# Patient Record
Sex: MALE | Race: WHITE | NOT HISPANIC OR LATINO | Employment: FULL TIME | URBAN - METROPOLITAN AREA
[De-identification: names, ages, dates, MRNs, and addresses within clinical notes are randomized per-mention and may not be internally consistent; named-entity substitution may affect disease eponyms.]

---

## 2019-04-20 ENCOUNTER — HOSPITAL ENCOUNTER (EMERGENCY)
Facility: CLINIC | Age: 39
Discharge: HOME | End: 2019-04-20
Attending: EMERGENCY MEDICINE | Admitting: EMERGENCY MEDICINE
Payer: COMMERCIAL

## 2019-04-20 VITALS
SYSTOLIC BLOOD PRESSURE: 130 MMHG | HEIGHT: 77 IN | RESPIRATION RATE: 16 BRPM | WEIGHT: 290 LBS | DIASTOLIC BLOOD PRESSURE: 75 MMHG | BODY MASS INDEX: 34.24 KG/M2 | HEART RATE: 64 BPM | TEMPERATURE: 97.7 F | OXYGEN SATURATION: 99 %

## 2019-04-20 DIAGNOSIS — R11.10 VOMITING AND DIARRHEA: ICD-10-CM

## 2019-04-20 DIAGNOSIS — R19.7 VOMITING AND DIARRHEA: ICD-10-CM

## 2019-04-20 LAB
ABO + RH BLD: NORMAL
ABO + RH BLD: NORMAL
ALBUMIN SERPL-MCNC: 3.7 G/DL (ref 3.4–5)
ALP SERPL-CCNC: 54 U/L (ref 40–150)
ALT SERPL W P-5'-P-CCNC: 48 U/L (ref 0–70)
ANION GAP SERPL CALCULATED.3IONS-SCNC: 6 MMOL/L (ref 3–14)
AST SERPL W P-5'-P-CCNC: 22 U/L (ref 0–45)
BASOPHILS # BLD AUTO: 0 10E9/L (ref 0–0.2)
BASOPHILS NFR BLD AUTO: 0.2 %
BILIRUB SERPL-MCNC: 0.6 MG/DL (ref 0.2–1.3)
BLD GP AB SCN SERPL QL: NORMAL
BLOOD BANK CMNT PATIENT-IMP: NORMAL
BUN SERPL-MCNC: 13 MG/DL (ref 7–30)
C COLI+JEJUNI+LARI FUSA STL QL NAA+PROBE: NOT DETECTED
CALCIUM SERPL-MCNC: 8.6 MG/DL (ref 8.5–10.1)
CHLORIDE SERPL-SCNC: 106 MMOL/L (ref 94–109)
CO2 SERPL-SCNC: 25 MMOL/L (ref 20–32)
CREAT SERPL-MCNC: 1.05 MG/DL (ref 0.66–1.25)
DIFFERENTIAL METHOD BLD: NORMAL
EC STX1 GENE STL QL NAA+PROBE: NOT DETECTED
EC STX2 GENE STL QL NAA+PROBE: NOT DETECTED
ENTERIC PATHOGEN COMMENT: NORMAL
EOSINOPHIL # BLD AUTO: 0.1 10E9/L (ref 0–0.7)
EOSINOPHIL NFR BLD AUTO: 1.8 %
ERYTHROCYTE [DISTWIDTH] IN BLOOD BY AUTOMATED COUNT: 13.4 % (ref 10–15)
GFR SERPL CREATININE-BSD FRML MDRD: 89 ML/MIN/{1.73_M2}
GLUCOSE SERPL-MCNC: 96 MG/DL (ref 70–99)
HCT VFR BLD AUTO: 48.2 % (ref 40–53)
HEMOCCULT STL QL IA: NEGATIVE
HGB BLD-MCNC: 15.4 G/DL (ref 13.3–17.7)
IMM GRANULOCYTES # BLD: 0 10E9/L (ref 0–0.4)
IMM GRANULOCYTES NFR BLD: 0.4 %
INR PPP: 1.29 (ref 0.86–1.14)
LYMPHOCYTES # BLD AUTO: 1.4 10E9/L (ref 0.8–5.3)
LYMPHOCYTES NFR BLD AUTO: 28.5 %
MCH RBC QN AUTO: 28.7 PG (ref 26.5–33)
MCHC RBC AUTO-ENTMCNC: 32 G/DL (ref 31.5–36.5)
MCV RBC AUTO: 90 FL (ref 78–100)
MONOCYTES # BLD AUTO: 0.6 10E9/L (ref 0–1.3)
MONOCYTES NFR BLD AUTO: 11.4 %
NEUTROPHILS # BLD AUTO: 2.9 10E9/L (ref 1.6–8.3)
NEUTROPHILS NFR BLD AUTO: 57.7 %
NOROV GI+II ORF1-ORF2 JNC STL QL NAA+PR: NOT DETECTED
NRBC # BLD AUTO: 0 10*3/UL
NRBC BLD AUTO-RTO: 0 /100
PLATELET # BLD AUTO: 230 10E9/L (ref 150–450)
POTASSIUM SERPL-SCNC: 3.8 MMOL/L (ref 3.4–5.3)
PROT SERPL-MCNC: 7.4 G/DL (ref 6.8–8.8)
RBC # BLD AUTO: 5.37 10E12/L (ref 4.4–5.9)
RVA NSP5 STL QL NAA+PROBE: NOT DETECTED
SALMONELLA SP RPOD STL QL NAA+PROBE: NOT DETECTED
SHIGELLA SP+EIEC IPAH STL QL NAA+PROBE: NOT DETECTED
SODIUM SERPL-SCNC: 137 MMOL/L (ref 133–144)
SPECIMEN EXP DATE BLD: NORMAL
V CHOL+PARA RFBL+TRKH+TNAA STL QL NAA+PR: NOT DETECTED
WBC # BLD AUTO: 5 10E9/L (ref 4–11)
Y ENTERO RECN STL QL NAA+PROBE: NOT DETECTED

## 2019-04-20 PROCEDURE — 99284 EMERGENCY DEPT VISIT MOD MDM: CPT | Mod: Z6 | Performed by: EMERGENCY MEDICINE

## 2019-04-20 PROCEDURE — 85610 PROTHROMBIN TIME: CPT | Performed by: EMERGENCY MEDICINE

## 2019-04-20 PROCEDURE — 82274 ASSAY TEST FOR BLOOD FECAL: CPT | Performed by: EMERGENCY MEDICINE

## 2019-04-20 PROCEDURE — 99284 EMERGENCY DEPT VISIT MOD MDM: CPT | Mod: 25 | Performed by: EMERGENCY MEDICINE

## 2019-04-20 PROCEDURE — 25000132 ZZH RX MED GY IP 250 OP 250 PS 637: Performed by: EMERGENCY MEDICINE

## 2019-04-20 PROCEDURE — 86850 RBC ANTIBODY SCREEN: CPT | Performed by: EMERGENCY MEDICINE

## 2019-04-20 PROCEDURE — 87506 IADNA-DNA/RNA PROBE TQ 6-11: CPT | Performed by: EMERGENCY MEDICINE

## 2019-04-20 PROCEDURE — 96374 THER/PROPH/DIAG INJ IV PUSH: CPT | Performed by: EMERGENCY MEDICINE

## 2019-04-20 PROCEDURE — 96375 TX/PRO/DX INJ NEW DRUG ADDON: CPT | Performed by: EMERGENCY MEDICINE

## 2019-04-20 PROCEDURE — 96361 HYDRATE IV INFUSION ADD-ON: CPT | Performed by: EMERGENCY MEDICINE

## 2019-04-20 PROCEDURE — C9113 INJ PANTOPRAZOLE SODIUM, VIA: HCPCS | Performed by: EMERGENCY MEDICINE

## 2019-04-20 PROCEDURE — 86901 BLOOD TYPING SEROLOGIC RH(D): CPT | Performed by: EMERGENCY MEDICINE

## 2019-04-20 PROCEDURE — 25000128 H RX IP 250 OP 636: Performed by: EMERGENCY MEDICINE

## 2019-04-20 PROCEDURE — 86900 BLOOD TYPING SEROLOGIC ABO: CPT | Performed by: EMERGENCY MEDICINE

## 2019-04-20 PROCEDURE — 80053 COMPREHEN METABOLIC PANEL: CPT | Performed by: EMERGENCY MEDICINE

## 2019-04-20 PROCEDURE — 85025 COMPLETE CBC W/AUTO DIFF WBC: CPT | Performed by: EMERGENCY MEDICINE

## 2019-04-20 RX ORDER — NICOTINE 21 MG/24HR
1 PATCH, TRANSDERMAL 24 HOURS TRANSDERMAL ONCE
Status: COMPLETED | OUTPATIENT
Start: 2019-04-20 | End: 2019-04-20

## 2019-04-20 RX ORDER — HYDROMORPHONE HYDROCHLORIDE 1 MG/ML
0.5 INJECTION, SOLUTION INTRAMUSCULAR; INTRAVENOUS; SUBCUTANEOUS
Status: DISCONTINUED | OUTPATIENT
Start: 2019-04-20 | End: 2019-04-20 | Stop reason: HOSPADM

## 2019-04-20 RX ORDER — SODIUM CHLORIDE 9 MG/ML
1000 INJECTION, SOLUTION INTRAVENOUS CONTINUOUS
Status: DISCONTINUED | OUTPATIENT
Start: 2019-04-20 | End: 2019-04-20 | Stop reason: HOSPADM

## 2019-04-20 RX ORDER — BUPROPION HYDROCHLORIDE 150 MG/1
150 TABLET ORAL
COMMUNITY
Start: 2019-04-18 | End: 2020-11-06 | Stop reason: ALTCHOICE

## 2019-04-20 RX ORDER — ONDANSETRON 2 MG/ML
8 INJECTION INTRAMUSCULAR; INTRAVENOUS ONCE
Status: DISCONTINUED | OUTPATIENT
Start: 2019-04-20 | End: 2019-04-20 | Stop reason: HOSPADM

## 2019-04-20 RX ADMIN — NICOTINE 1 PATCH: 21 PATCH TRANSDERMAL at 13:53

## 2019-04-20 RX ADMIN — SODIUM CHLORIDE 1000 ML: 9 INJECTION, SOLUTION INTRAVENOUS at 12:16

## 2019-04-20 RX ADMIN — PANTOPRAZOLE SODIUM 40 MG: 40 INJECTION, POWDER, LYOPHILIZED, FOR SOLUTION INTRAVENOUS at 12:18

## 2019-04-20 ASSESSMENT — ENCOUNTER SYMPTOMS
NAUSEA: 1
DIARRHEA: 1
ABDOMINAL PAIN: 1
VOMITING: 1

## 2019-04-20 ASSESSMENT — MIFFLIN-ST. JEOR: SCORE: 2352.81

## 2019-04-20 NOTE — DISCHARGE INSTRUCTIONS
Keep hydrated    Advance diet as tolerated over the next 24 hours    Please make an appointment to follow up with Your Primary Care Provider in 3 days if not improving.

## 2019-04-20 NOTE — ED AVS SNAPSHOT
[facility], [place], Emergency Department  [address] [place] [address]  Phone:  [phone]                                    Jace Pratt   MRN: [id number]    Department:  [facility], Emergency Department   Date of Visit:  4/20/2019           After Visit Summary Signature Page    I have received my discharge instructions, and my questions have been answered. I have discussed any challenges I see with this plan with the nurse or doctor.    ..........................................................................................................................................  Patient/Patient Representative Signature      ..........................................................................................................................................  Patient Representative Print Name and Relationship to Patient    ..................................................               ................................................  Date                                   Time    ..........................................................................................................................................  Reviewed by Signature/Title    ...................................................              ..............................................  Date                                               Time          22EPIC Rev 08/18

## 2019-04-20 NOTE — ED TRIAGE NOTES
Patient here with complaints of black, tarry stool since Thursday and shortness of breath. Is on Xeralto for PE after corrective surgery on his right foot.

## 2019-04-20 NOTE — ED PROVIDER NOTES
History     Chief Complaint   Patient presents with     Rectal Bleeding     The history is provided by the patient and medical records.     Jace Pratt is a 38 year old male who has been on Xarelto for a pulmonary embolus that came as a result of a DVT as a result of foot surgery he had in February.  The patient had been seen up at [facility] and follow-up scans 3 weeks ago revealed both the chest CT and his right lower extremity ultrasound showed no evidence of any residual clot.  The patient states that the last 24 hours he ate some food and, shortly there afterwards, got sick to the point where he was having nausea, vomiting, and diarrhea.  Patient was in [place] at the time and states he was supposed to have a fun weekend.  Patient states he spent most the weekend, however, in the hotel room.  When the patient's vomiting continued today, he came to the ER for further evaluation.  The patient started having some black tarry stools yesterday and then continued today, with his last stool being 1 hour ago.  Patient was having watery diarrhea that was black in nature.  Patient admits to taking Pepto-Bismol and thinks the black tarry stools started before he took the Pepto-Bismol.  Patient denies any near syncopal episodes, denies any chest pain, but complains of epigastric pain.    This part of the medical record was transcribed by Clementina Mcmullen Medical Scribe, from a dictation done by Marcelo Brower MD.    I have reviewed the Medications, Allergies, Past Medical and Surgical History, and Social History in the [facility] system.    Past Medical History:   Diagnosis Date     Neuromuscular disorder (H)        No current facility-administered medications on file prior to encounter.   Current Outpatient Medications on File Prior to Encounter:  buPROPion (WELLBUTRIN XL) 150 MG 24 hr tablet Take 150 mg by mouth   rivaroxaban ANTICOAGULANT (XARELTO) 20 MG TABS tablet Take 20 mg by mouth     Allergies   Allergen Reactions      "Azithromycin Nausea and Vomiting     Latex Hives     Morphine Hives     Penicillins Nausea and Vomiting     Social History     Socioeconomic History     Marital status:      Spouse name: Not on file     Number of children: Not on file     Years of education: Not on file     Highest education level: Not on file   Occupational History     Not on file   Social Needs     Financial resource strain: Not on file     Food insecurity:     Worry: Not on file     Inability: Not on file     Transportation needs:     Medical: Not on file     Non-medical: Not on file   Tobacco Use     Smoking status: Former Smoker     Smokeless tobacco: Current User   Substance and Sexual Activity     Alcohol use: Not Currently     Alcohol/week: 0.0 oz     Drug use: Never     Sexual activity: Not on file   Lifestyle     Physical activity:     Days per week: Not on file     Minutes per session: Not on file     Stress: Not on file   Relationships     Social connections:     Talks on phone: Not on file     Gets together: Not on file     Attends Confucianist service: Not on file     Active member of club or organization: Not on file     Attends meetings of clubs or organizations: Not on file     Relationship status: Not on file     Intimate partner violence:     Fear of current or ex partner: Not on file     Emotionally abused: Not on file     Physically abused: Not on file     Forced sexual activity: Not on file   Other Topics Concern     Not on file   Social History Narrative     Not on file     History reviewed. No pertinent surgical history.  History reviewed. No pertinent family history.    Review of Systems   Cardiovascular: Negative for chest pain.   Gastrointestinal: Positive for abdominal pain (Epigastric), diarrhea (Black tarry stool), nausea and vomiting.   Neurological: Negative for syncope.       Physical Exam   BP: (!) 140/95  Pulse: 63  Heart Rate: 65  Temp: 97.7  F (36.5  C)  Resp: 18  Height: 195.6 cm (6' 5\")  Weight: 131.5 kg " (290 lb)  SpO2: 97 %      Physical Exam   Constitutional: He is oriented to person, place, and time.   Alert conversant pleasant   HENT:   Head: Atraumatic.   Eyes: Pupils are equal, round, and reactive to light. EOM are normal.   Neck: Neck supple.   Cardiovascular: Regular rhythm.   Pulmonary/Chest: Breath sounds normal.   Abdominal: Soft. There is no tenderness.   Genitourinary:   Genitourinary Comments: Patient's stool guaiac was done with mucus from the patient's rectal vault and this had an equivocal reading on the guaiac card    Stool specimen will be sent for stool guaiac in the lab.   Musculoskeletal: He exhibits no edema or deformity.   Neurological: He is alert and oriented to person, place, and time.   Grossly intact and symmetric   Skin: Skin is warm.   Psychiatric: He has a normal mood and affect.   Nursing note and vitals reviewed.      ED Course        Procedures        Results for orders placed or performed during the hospital encounter of 04/20/19   CBC with platelets differential   Result Value Ref Range    WBC 5.0 4.0 - 11.0 10e9/L    RBC Count 5.37 4.4 - 5.9 10e12/L    Hemoglobin 15.4 13.3 - 17.7 g/dL    Hematocrit 48.2 40.0 - 53.0 %    MCV 90 78 - 100 fl    MCH 28.7 26.5 - 33.0 pg    MCHC 32.0 31.5 - 36.5 g/dL    RDW 13.4 10.0 - 15.0 %    Platelet Count 230 150 - 450 10e9/L    Diff Method Automated Method     % Neutrophils 57.7 %    % Lymphocytes 28.5 %    % Monocytes 11.4 %    % Eosinophils 1.8 %    % Basophils 0.2 %    % Immature Granulocytes 0.4 %    Nucleated RBCs 0 0 /100    Absolute Neutrophil 2.9 1.6 - 8.3 10e9/L    Absolute Lymphocytes 1.4 0.8 - 5.3 10e9/L    Absolute Monocytes 0.6 0.0 - 1.3 10e9/L    Absolute Eosinophils 0.1 0.0 - 0.7 10e9/L    Absolute Basophils 0.0 0.0 - 0.2 10e9/L    Abs Immature Granulocytes 0.0 0 - 0.4 10e9/L    Absolute Nucleated RBC 0.0    Comprehensive metabolic panel   Result Value Ref Range    Sodium 137 133 - 144 mmol/L    Potassium 3.8 3.4 - 5.3 mmol/L     Chloride 106 94 - 109 mmol/L    Carbon Dioxide 25 20 - 32 mmol/L    Anion Gap 6 3 - 14 mmol/L    Glucose 96 70 - 99 mg/dL    Urea Nitrogen 13 7 - 30 mg/dL    Creatinine 1.05 0.66 - 1.25 mg/dL    GFR Estimate 89 >60 mL/min/[1.73_m2]    GFR Estimate If Black >90 >60 mL/min/[1.73_m2]    Calcium 8.6 8.5 - 10.1 mg/dL    Bilirubin Total 0.6 0.2 - 1.3 mg/dL    Albumin 3.7 3.4 - 5.0 g/dL    Protein Total 7.4 6.8 - 8.8 g/dL    Alkaline Phosphatase 54 40 - 150 U/L    ALT 48 0 - 70 U/L    AST 22 0 - 45 U/L   INR   Result Value Ref Range    INR 1.29 (H) 0.86 - 1.14   Fecal colorectal cancer screen FIT   Result Value Ref Range    Occult Blood Scn FIT Negative NEG^Negative   ABO/Rh type and screen   Result Value Ref Range    ABO A     RH(D) Pos     Antibody Screen Neg     Test Valid Only At          [facility]    Specimen Expires 04/23/2019        Labs Ordered and Resulted from Time of ED Arrival Up to the Time of Departure from the ED   INR - Abnormal; Notable for the following components:       Result Value    INR 1.29 (*)     All other components within normal limits   CBC WITH PLATELETS DIFFERENTIAL   COMPREHENSIVE METABOLIC PANEL   FECAL COLORECTAL CANCER SCREEN FIT   PERIPHERAL IV CATHETER   ABO/RH TYPE AND SCREEN   ENTERIC BACTERIA AND VIRUS PANEL BY MARY STOOL            Assessments & Plan (with Medical Decision Making)     I have reviewed the nursing notes.    Medications   0.9% sodium chloride BOLUS (0 mLs Intravenous Stopped 4/20/19 0020)     Followed by   sodium chloride 0.9% infusion (has no administration in time range)   ondansetron (ZOFRAN) injection 8 mg (has no administration in time range)   HYDROmorphone (PF) (DILAUDID) injection 0.5 mg (has no administration in time range)   nicotine patch REMOVAL (has no administration in time range)   nicotine Patch in Place ( Transdermal Patch in Place 4/20/19 9607)   pantoprazole (PROTONIX) 40 mg IV push injection (40 mg  Intravenous Given 4/20/19 1218)   nicotine (NICODERM CQ) 21 MG/24HR 24 hr patch 1 patch (1 patch Transdermal Given 4/20/19 8413)       Patient stool in the lab came back without blood indicating that the apparent melena was Pepto-Bismol related.  At this time the patient is taking orally well and will be sent home.  Patient more than likely developed vomiting and diarrhea from gastroenteritis or food poisoning.    I have reviewed the findings, diagnosis, plan and need for follow up with the patient.    Final diagnoses:   Vomiting and diarrhea     Keep hydrated    Advance diet as tolerated over the next 24 hours    Please make an appointment to follow up with Your Primary Care Provider in 3 days if not improving.    Routine discharge instructions were given for this diagnosis    Marcelo Brower MD    4/20/2019   [facility], EMERGENCY DEPARTMENT     Marcelo Brower MD  04/20/19 4138

## 2020-07-10 ENCOUNTER — PRE VISIT (OUTPATIENT)
Dept: ORTHOPEDICS | Facility: CLINIC | Age: 40
End: 2020-07-10

## 2020-07-11 NOTE — TELEPHONE ENCOUNTER
DIAGNOSIS: Pt saw Scott many years ago and did surgery for Right Talus.   Pt had right ankle replacement on 02/2019 with Dr Shankar from [facility] who has now retired. Imaging also completed around time of procedure. Pt has started to develop pain and noticed lack of strength in right ankle. Records in care everywhere.   APPOINTMENT DATE: 8/4/2020   NOTES STATUS DETAILS   OFFICE NOTE from referring provider Care Everywhere    OFFICE NOTE from other specialist Care Everywhere    DISCHARGE SUMMARY from hospital Care Everywhere    DISCHARGE REPORT from the ER Care Everywhere    OPERATIVE REPORT Care Everywhere    MEDICATION LIST Care Everywhere    EMG (for Spine) N/A    IMPLANT RECORD/STICKER N/A    LABS     CBC/DIFF Care Everywhere    CULTURES N/A    INJECTIONS DONE IN RADIOLOGY N/A    MRI N/A    CT SCAN N/A    XRAYS (IMAGES & REPORTS) In pacs    TUMOR     PATHOLOGY  Slides & report N/A      Action    Action Taken REQ SENT TO [facility] FOR IMAGING CDK     Action    Action Taken img resolved in pacs cdk

## 2020-07-21 DIAGNOSIS — M25.571 RIGHT ANKLE PAIN: Primary | ICD-10-CM

## 2020-08-04 ENCOUNTER — ANCILLARY PROCEDURE (OUTPATIENT)
Dept: GENERAL RADIOLOGY | Facility: CLINIC | Age: 40
End: 2020-08-04
Attending: ORTHOPAEDIC SURGERY
Payer: COMMERCIAL

## 2020-08-04 ENCOUNTER — OFFICE VISIT (OUTPATIENT)
Dept: ORTHOPEDICS | Facility: CLINIC | Age: 40
End: 2020-08-04
Payer: COMMERCIAL

## 2020-08-04 ENCOUNTER — ANCILLARY PROCEDURE (OUTPATIENT)
Dept: CT IMAGING | Facility: CLINIC | Age: 40
End: 2020-08-04
Attending: ORTHOPAEDIC SURGERY
Payer: COMMERCIAL

## 2020-08-04 VITALS — BODY MASS INDEX: 34.86 KG/M2 | HEIGHT: 77 IN | WEIGHT: 295.2 LBS

## 2020-08-04 DIAGNOSIS — M25.571 PAIN IN JOINT, ANKLE AND FOOT, RIGHT: Primary | ICD-10-CM

## 2020-08-04 DIAGNOSIS — M25.571 RIGHT ANKLE PAIN: ICD-10-CM

## 2020-08-04 PROCEDURE — 73700 CT LOWER EXTREMITY W/O DYE: CPT | Mod: TC

## 2020-08-04 RX ORDER — BUSPIRONE HYDROCHLORIDE 15 MG/1
TABLET ORAL
Status: ON HOLD | COMMUNITY
Start: 2019-12-05 | End: 2020-12-01

## 2020-08-04 ASSESSMENT — MIFFLIN-ST. JEOR: SCORE: 2374.01

## 2020-08-04 NOTE — LETTER
8/4/2020         RE: Jace Pratt  [address] [place] [address]        Dear Colleague,    Thank you for referring your patient, Jace Pratt, to the [facility]. Please see a copy of my visit note below.    CHIEF COMPLAINT:  Right ankle pain, status post right calcaneus osteotomy with first metatarsal osteotomy and plantar fascia release performed by an outside provider on 02/25/2019.      HISTORY OF PRESENT ILLNESS:  Mr. Pratt is a 39-year-old male who presents today for evaluation of his right foot and ankle.  The patient reports to have had a long history of high arches and to have had a recent surgery in 02/2019 to correct his high arch.  These were performed by Dr. Shankar, who is a former local podiatrist.  At that time, a calcaneus osteotomy with a first metatarsal closing wedge osteotomy and an endoscopic plantar fascia release was performed.      The patient reports to have had an uneventful postoperative course.  Reports to have undergone physical therapy afterwards as well.  Now reports to have pain and discomfort located along the ankle joint, reports to be very stiff in the morning and to have difficulties ranging the ankle joint.  The patient reports to have difficulties with prolonged walking that he will not be able to walk beyond 10-11 holes of a golf course.      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  Reports to enjoy camping and hanging out with his family as he is unable to do any golfing anymore.      PAST MEDICAL HISTORY:  None.      PAST SURGICAL HISTORY:  Reviewed today.      DRUG ALLERGIES:  Reviewed today.      CURRENT MEDICATIONS:  Please refer to encounter form.      PHYSICAL EXAMINATION:  On today's visit, he presents as a pleasant male in no apparent distress with a height of 6 feet 5 and a weight of 295  pounds.  Denies to have any constitutional symptoms.      On today's visit, he presents with a range of motion of the ankle from 5 degrees of dorsiflexion down to 30 degrees of plantar flexion.  Overall, he presents with excellent alignment with a slightly hindfoot valgus, which is much improved when compared to the opposite side.  There are well-healed surgical incisions.  There is pain with palpation of the anterior aspect of the ankle joint.  First metatarsal and the calcaneus tuberosity are nontender.  He presents with some discomfort along the plantar fascia as well as the posterior aspect of the calcaneus tuberosity.  Achilles tendon is nontender.  To the best of my ability, the patient has not undergone an Achilles tendon lengthening.      IMAGING:  Plain x-rays were reviewed today with 3 views of the right ankle which were significant for showing no obvious arthritic changes and with what seems to be a healed calcaneus osteotomy.  However, the lateral projection is difficult to assess and is difficult to assess exactly if he presents with any anterior impingement of the ankle joint because of the lack of true lateral projection.      ASSESSMENT:   1.  Right ankle pain, possible impingement.   2.  Status post right calcaneus osteotomy with first metatarsal osteotomy and plantar fascia release performed by an outside provider.      PLAN:  I discussed with the patient that at this point I would like to proceed with a CT scan of the ankle as a way to better understand his bony anatomy.  Based on those findings, further recommendations will be given to the patient.      In the presence of a normal ankle, I would recommend to proceed with a corticosteroid injection to the right ankle joint and in the presence of some impingement will recommend him to undergo an arthroscopic debridement.      The patient one way or the other would benefit as well from physical therapy for range of motion exercises and strengthening  as well.      All questions were answered.  The patient will follow up accordingly.  He has no activity restrictions.  All questions were answered.      TT 30 minutes, CT 20 minutes.     Sincerely,        Jonel Chavez MD

## 2020-08-04 NOTE — NURSING NOTE
"Reason For Visit:   Chief Complaint   Patient presents with     RECHECK     right ankle pain and weakness       Ht 1.96 m (6' 5.17\")   Wt 133.9 kg (295 lb 3.2 oz)   BMI 34.86 kg/m      Pain Assessment  Patient Currently in Pain: Yes(Patient reports that the pain comes and goes. Stairs are difficult.)  Primary Pain Location: Ankle(and heel)    [place] [facility]    "

## 2020-08-04 NOTE — PROGRESS NOTES
CHIEF COMPLAINT:  Right ankle pain, status post right calcaneus osteotomy with first metatarsal osteotomy and plantar fascia release performed by an outside provider on 02/25/2019.      HISTORY OF PRESENT ILLNESS:  Mr. Pratt is a 39-year-old male who presents today for evaluation of his right foot and ankle.  The patient reports to have had a long history of high arches and to have had a recent surgery in 02/2019 to correct his high arch.  These were performed by Dr. Shankar, who is a former local podiatrist.  At that time, a calcaneus osteotomy with a first metatarsal closing wedge osteotomy and an endoscopic plantar fascia release was performed.      The patient reports to have had an uneventful postoperative course.  Reports to have undergone physical therapy afterwards as well.  Now reports to have pain and discomfort located along the ankle joint, reports to be very stiff in the morning and to have difficulties ranging the ankle joint.  The patient reports to have difficulties with prolonged walking that he will not be able to walk beyond 10-11 holes of a golf course.      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  Reports to enjoy camping and hanging out with his family as he is unable to do any golfing anymore.      PAST MEDICAL HISTORY:  None.      PAST SURGICAL HISTORY:  Reviewed today.      DRUG ALLERGIES:  Reviewed today.      CURRENT MEDICATIONS:  Please refer to encounter form.      PHYSICAL EXAMINATION:  On today's visit, he presents as a pleasant male in no apparent distress with a height of 6 feet 5 and a weight of 295 pounds.  Denies to have any constitutional symptoms.      On today's visit, he presents with a range of motion of the ankle from 5 degrees of dorsiflexion down to 30 degrees of plantar flexion.  Overall, he presents with excellent alignment with a  slightly hindfoot valgus, which is much improved when compared to the opposite side.  There are well-healed surgical incisions.  There is pain with palpation of the anterior aspect of the ankle joint.  First metatarsal and the calcaneus tuberosity are nontender.  He presents with some discomfort along the plantar fascia as well as the posterior aspect of the calcaneus tuberosity.  Achilles tendon is nontender.  To the best of my ability, the patient has not undergone an Achilles tendon lengthening.      IMAGING:  Plain x-rays were reviewed today with 3 views of the right ankle which were significant for showing no obvious arthritic changes and with what seems to be a healed calcaneus osteotomy.  However, the lateral projection is difficult to assess and is difficult to assess exactly if he presents with any anterior impingement of the ankle joint because of the lack of true lateral projection.      ASSESSMENT:   1.  Right ankle pain, possible impingement.   2.  Status post right calcaneus osteotomy with first metatarsal osteotomy and plantar fascia release performed by an outside provider.      PLAN:  I discussed with the patient that at this point I would like to proceed with a CT scan of the ankle as a way to better understand his bony anatomy.  Based on those findings, further recommendations will be given to the patient.      In the presence of a normal ankle, I would recommend to proceed with a corticosteroid injection to the right ankle joint and in the presence of some impingement will recommend him to undergo an arthroscopic debridement.      The patient one way or the other would benefit as well from physical therapy for range of motion exercises and strengthening as well.      All questions were answered.  The patient will follow up accordingly.  He has no activity restrictions.  All questions were answered.      TT 30 minutes, CT 20 minutes.

## 2020-08-17 ENCOUNTER — TELEPHONE (OUTPATIENT)
Dept: ORTHOPEDICS | Facility: CLINIC | Age: 40
End: 2020-08-17

## 2020-08-17 DIAGNOSIS — M25.571 PAIN IN JOINT, ANKLE AND FOOT, RIGHT: Primary | ICD-10-CM

## 2020-08-17 NOTE — TELEPHONE ENCOUNTER
Jace  was seen last 8/4/2020 when Right ankle CT was ordered and done after the appt.   Dr Chavez reviewed the images and recommended pt undergo Right ankle injection with lidocaine and Kenalog.  Pt was phoned with the above message and radiology scheduling number [phone].  Mary Mata RN

## 2020-08-25 DIAGNOSIS — Z11.59 ENCOUNTER FOR SCREENING FOR OTHER VIRAL DISEASES: Primary | ICD-10-CM

## 2020-09-06 DIAGNOSIS — Z11.59 ENCOUNTER FOR SCREENING FOR OTHER VIRAL DISEASES: ICD-10-CM

## 2020-09-06 PROCEDURE — U0003 INFECTIOUS AGENT DETECTION BY NUCLEIC ACID (DNA OR RNA); SEVERE ACUTE RESPIRATORY SYNDROME CORONAVIRUS 2 (SARS-COV-2) (CORONAVIRUS DISEASE [COVID-19]), AMPLIFIED PROBE TECHNIQUE, MAKING USE OF HIGH THROUGHPUT TECHNOLOGIES AS DESCRIBED BY CMS-2020-01-R: HCPCS | Performed by: ORTHOPAEDIC SURGERY

## 2020-09-07 LAB
SARS-COV-2 RNA SPEC QL NAA+PROBE: NOT DETECTED
SPECIMEN SOURCE: NORMAL

## 2020-09-10 ENCOUNTER — ANCILLARY PROCEDURE (OUTPATIENT)
Dept: GENERAL RADIOLOGY | Facility: CLINIC | Age: 40
End: 2020-09-10
Attending: ORTHOPAEDIC SURGERY
Payer: COMMERCIAL

## 2020-09-10 DIAGNOSIS — M25.571 PAIN IN JOINT, ANKLE AND FOOT, RIGHT: ICD-10-CM

## 2020-09-10 RX ORDER — BUPIVACAINE HYDROCHLORIDE 2.5 MG/ML
10 INJECTION, SOLUTION EPIDURAL; INFILTRATION; INTRACAUDAL ONCE
Status: COMPLETED | OUTPATIENT
Start: 2020-09-10 | End: 2020-09-10

## 2020-09-10 RX ORDER — IOPAMIDOL 408 MG/ML
20 INJECTION, SOLUTION INTRATHECAL ONCE
Status: COMPLETED | OUTPATIENT
Start: 2020-09-10 | End: 2020-09-10

## 2020-09-10 RX ORDER — TRIAMCINOLONE ACETONIDE 40 MG/ML
40 INJECTION, SUSPENSION INTRA-ARTICULAR; INTRAMUSCULAR ONCE
Status: COMPLETED | OUTPATIENT
Start: 2020-09-10 | End: 2020-09-10

## 2020-09-10 RX ORDER — LIDOCAINE HYDROCHLORIDE 10 MG/ML
30 INJECTION, SOLUTION EPIDURAL; INFILTRATION; INTRACAUDAL; PERINEURAL ONCE
Status: COMPLETED | OUTPATIENT
Start: 2020-09-10 | End: 2020-09-10

## 2020-09-10 RX ADMIN — IOPAMIDOL 2 ML: 408 INJECTION, SOLUTION INTRATHECAL at 09:56

## 2020-09-10 RX ADMIN — BUPIVACAINE HYDROCHLORIDE 10 MG: 2.5 INJECTION, SOLUTION EPIDURAL; INFILTRATION; INTRACAUDAL at 09:58

## 2020-09-10 RX ADMIN — TRIAMCINOLONE ACETONIDE 40 MG: 40 INJECTION, SUSPENSION INTRA-ARTICULAR; INTRAMUSCULAR at 09:56

## 2020-09-10 RX ADMIN — LIDOCAINE HYDROCHLORIDE 5 ML: 10 INJECTION, SOLUTION EPIDURAL; INFILTRATION; INTRACAUDAL; PERINEURAL at 09:55

## 2020-09-14 ENCOUNTER — APPOINTMENT (OUTPATIENT)
Dept: CT IMAGING | Facility: CLINIC | Age: 40
End: 2020-09-14
Attending: EMERGENCY MEDICINE
Payer: COMMERCIAL

## 2020-09-14 ENCOUNTER — HOSPITAL ENCOUNTER (EMERGENCY)
Facility: CLINIC | Age: 40
Discharge: HOME | End: 2020-09-14
Attending: EMERGENCY MEDICINE | Admitting: EMERGENCY MEDICINE
Payer: COMMERCIAL

## 2020-09-14 ENCOUNTER — APPOINTMENT (OUTPATIENT)
Dept: GENERAL RADIOLOGY | Facility: CLINIC | Age: 40
End: 2020-09-14
Attending: EMERGENCY MEDICINE
Payer: COMMERCIAL

## 2020-09-14 VITALS
OXYGEN SATURATION: 98 % | WEIGHT: 290 LBS | HEIGHT: 77 IN | DIASTOLIC BLOOD PRESSURE: 91 MMHG | HEART RATE: 62 BPM | RESPIRATION RATE: 15 BRPM | TEMPERATURE: 98.2 F | SYSTOLIC BLOOD PRESSURE: 140 MMHG | BODY MASS INDEX: 34.24 KG/M2

## 2020-09-14 DIAGNOSIS — R07.9 CHEST PAIN, UNSPECIFIED TYPE: ICD-10-CM

## 2020-09-14 LAB
ANION GAP SERPL CALCULATED.3IONS-SCNC: 3 MMOL/L (ref 3–14)
BASOPHILS # BLD AUTO: 0 10E9/L (ref 0–0.2)
BASOPHILS NFR BLD AUTO: 0.3 %
BUN SERPL-MCNC: 21 MG/DL (ref 7–30)
CALCIUM SERPL-MCNC: 9.3 MG/DL (ref 8.5–10.1)
CHLORIDE SERPL-SCNC: 106 MMOL/L (ref 94–109)
CO2 SERPL-SCNC: 29 MMOL/L (ref 20–32)
CREAT SERPL-MCNC: 1.22 MG/DL (ref 0.66–1.25)
D DIMER PPP FEU-MCNC: 0.4 UG/ML FEU (ref 0–0.5)
DIFFERENTIAL METHOD BLD: NORMAL
EOSINOPHIL # BLD AUTO: 0.1 10E9/L (ref 0–0.7)
EOSINOPHIL NFR BLD AUTO: 0.7 %
ERYTHROCYTE [DISTWIDTH] IN BLOOD BY AUTOMATED COUNT: 13.4 % (ref 10–15)
GFR SERPL CREATININE-BSD FRML MDRD: 74 ML/MIN/{1.73_M2}
GLUCOSE SERPL-MCNC: 131 MG/DL (ref 70–99)
HCT VFR BLD AUTO: 50 % (ref 40–53)
HGB BLD-MCNC: 16.2 G/DL (ref 13.3–17.7)
IMM GRANULOCYTES # BLD: 0.1 10E9/L (ref 0–0.4)
IMM GRANULOCYTES NFR BLD: 0.7 %
LYMPHOCYTES # BLD AUTO: 3.5 10E9/L (ref 0.8–5.3)
LYMPHOCYTES NFR BLD AUTO: 32.9 %
MCH RBC QN AUTO: 28.9 PG (ref 26.5–33)
MCHC RBC AUTO-ENTMCNC: 32.4 G/DL (ref 31.5–36.5)
MCV RBC AUTO: 89 FL (ref 78–100)
MONOCYTES # BLD AUTO: 1 10E9/L (ref 0–1.3)
MONOCYTES NFR BLD AUTO: 9.5 %
NEUTROPHILS # BLD AUTO: 6 10E9/L (ref 1.6–8.3)
NEUTROPHILS NFR BLD AUTO: 55.9 %
NRBC # BLD AUTO: 0 10*3/UL
NRBC BLD AUTO-RTO: 0 /100
NT-PROBNP SERPL-MCNC: 20 PG/ML (ref 0–450)
PLATELET # BLD AUTO: 242 10E9/L (ref 150–450)
POTASSIUM SERPL-SCNC: 3.8 MMOL/L (ref 3.4–5.3)
RBC # BLD AUTO: 5.6 10E12/L (ref 4.4–5.9)
SODIUM SERPL-SCNC: 138 MMOL/L (ref 133–144)
TROPONIN I SERPL-MCNC: <0.015 UG/L (ref 0–0.04)
WBC # BLD AUTO: 10.7 10E9/L (ref 4–11)

## 2020-09-14 PROCEDURE — 85025 COMPLETE CBC W/AUTO DIFF WBC: CPT | Performed by: EMERGENCY MEDICINE

## 2020-09-14 PROCEDURE — 71275 CT ANGIOGRAPHY CHEST: CPT

## 2020-09-14 PROCEDURE — 84484 ASSAY OF TROPONIN QUANT: CPT | Performed by: EMERGENCY MEDICINE

## 2020-09-14 PROCEDURE — 25000125 ZZHC RX 250: Performed by: EMERGENCY MEDICINE

## 2020-09-14 PROCEDURE — 99285 EMERGENCY DEPT VISIT HI MDM: CPT | Mod: 25

## 2020-09-14 PROCEDURE — 80048 BASIC METABOLIC PNL TOTAL CA: CPT | Performed by: EMERGENCY MEDICINE

## 2020-09-14 PROCEDURE — 25000128 H RX IP 250 OP 636: Performed by: EMERGENCY MEDICINE

## 2020-09-14 PROCEDURE — 93005 ELECTROCARDIOGRAM TRACING: CPT

## 2020-09-14 PROCEDURE — 85379 FIBRIN DEGRADATION QUANT: CPT | Performed by: EMERGENCY MEDICINE

## 2020-09-14 PROCEDURE — 71045 X-RAY EXAM CHEST 1 VIEW: CPT

## 2020-09-14 PROCEDURE — 83880 ASSAY OF NATRIURETIC PEPTIDE: CPT | Performed by: EMERGENCY MEDICINE

## 2020-09-14 RX ORDER — IOPAMIDOL 755 MG/ML
500 INJECTION, SOLUTION INTRAVASCULAR ONCE
Status: COMPLETED | OUTPATIENT
Start: 2020-09-14 | End: 2020-09-14

## 2020-09-14 RX ORDER — METOPROLOL TARTRATE 25 MG/1
25 TABLET, FILM COATED ORAL 2 TIMES DAILY
Qty: 30 TABLET | Refills: 0 | Status: SHIPPED | OUTPATIENT
Start: 2020-09-14 | End: 2020-11-06 | Stop reason: ALTCHOICE

## 2020-09-14 RX ADMIN — IOPAMIDOL 77 ML: 755 INJECTION, SOLUTION INTRAVENOUS at 22:35

## 2020-09-14 RX ADMIN — SODIUM CHLORIDE 90 ML: 9 INJECTION, SOLUTION INTRAVENOUS at 22:35

## 2020-09-14 ASSESSMENT — MIFFLIN-ST. JEOR: SCORE: 2347.81

## 2020-09-14 NOTE — ED AVS SNAPSHOT
[facility] Emergency Department  [address]  [facility] [address]  Phone:  [phone]  Fax:  [phone]                                    Jace Pratt   MRN: [id number]    Department:  [facility] Emergency Department   Date of Visit:  9/14/2020           After Visit Summary Signature Page    I have received my discharge instructions, and my questions have been answered. I have discussed any challenges I see with this plan with the nurse or doctor.    ..........................................................................................................................................  Patient/Patient Representative Signature      ..........................................................................................................................................  Patient Representative Print Name and Relationship to Patient    ..................................................               ................................................  Date                                   Time    ..........................................................................................................................................  Reviewed by Signature/Title    ...................................................              ..............................................  Date                                               Time          22EPIC Rev 08/18

## 2020-09-15 ENCOUNTER — TELEPHONE (OUTPATIENT)
Dept: CARDIOLOGY | Facility: CLINIC | Age: 40
End: 2020-09-15

## 2020-09-15 ENCOUNTER — HOSPITAL ENCOUNTER (EMERGENCY)
Facility: CLINIC | Age: 40
Discharge: HOME | End: 2020-09-16
Attending: EMERGENCY MEDICINE | Admitting: EMERGENCY MEDICINE
Payer: COMMERCIAL

## 2020-09-15 DIAGNOSIS — I71.21 ASCENDING AORTIC ANEURYSM (H): Primary | ICD-10-CM

## 2020-09-15 DIAGNOSIS — R07.9 CHEST PAIN, UNSPECIFIED TYPE: ICD-10-CM

## 2020-09-15 LAB
ANION GAP SERPL CALCULATED.3IONS-SCNC: 5 MMOL/L (ref 3–14)
BASOPHILS # BLD AUTO: 0 10E9/L (ref 0–0.2)
BASOPHILS NFR BLD AUTO: 0.3 %
BUN SERPL-MCNC: 19 MG/DL (ref 7–30)
CALCIUM SERPL-MCNC: 8.9 MG/DL (ref 8.5–10.1)
CHLORIDE SERPL-SCNC: 108 MMOL/L (ref 94–109)
CO2 SERPL-SCNC: 27 MMOL/L (ref 20–32)
CREAT SERPL-MCNC: 1.06 MG/DL (ref 0.66–1.25)
DIFFERENTIAL METHOD BLD: NORMAL
EOSINOPHIL # BLD AUTO: 0.2 10E9/L (ref 0–0.7)
EOSINOPHIL NFR BLD AUTO: 1.7 %
ERYTHROCYTE [DISTWIDTH] IN BLOOD BY AUTOMATED COUNT: 13.4 % (ref 10–15)
GFR SERPL CREATININE-BSD FRML MDRD: 87 ML/MIN/{1.73_M2}
GLUCOSE SERPL-MCNC: 128 MG/DL (ref 70–99)
HCT VFR BLD AUTO: 48.7 % (ref 40–53)
HGB BLD-MCNC: 15.9 G/DL (ref 13.3–17.7)
IMM GRANULOCYTES # BLD: 0.1 10E9/L (ref 0–0.4)
IMM GRANULOCYTES NFR BLD: 0.5 %
INTERPRETATION ECG - MUSE: NORMAL
LYMPHOCYTES # BLD AUTO: 3.3 10E9/L (ref 0.8–5.3)
LYMPHOCYTES NFR BLD AUTO: 32.5 %
MCH RBC QN AUTO: 29.1 PG (ref 26.5–33)
MCHC RBC AUTO-ENTMCNC: 32.6 G/DL (ref 31.5–36.5)
MCV RBC AUTO: 89 FL (ref 78–100)
MONOCYTES # BLD AUTO: 0.9 10E9/L (ref 0–1.3)
MONOCYTES NFR BLD AUTO: 9.2 %
NEUTROPHILS # BLD AUTO: 5.7 10E9/L (ref 1.6–8.3)
NEUTROPHILS NFR BLD AUTO: 55.8 %
NRBC # BLD AUTO: 0 10*3/UL
NRBC BLD AUTO-RTO: 0 /100
PLATELET # BLD AUTO: 223 10E9/L (ref 150–450)
POTASSIUM SERPL-SCNC: 4.1 MMOL/L (ref 3.4–5.3)
RBC # BLD AUTO: 5.47 10E12/L (ref 4.4–5.9)
SODIUM SERPL-SCNC: 140 MMOL/L (ref 133–144)
TROPONIN I SERPL-MCNC: <0.015 UG/L (ref 0–0.04)
WBC # BLD AUTO: 10.2 10E9/L (ref 4–11)

## 2020-09-15 PROCEDURE — 93005 ELECTROCARDIOGRAM TRACING: CPT

## 2020-09-15 PROCEDURE — 99284 EMERGENCY DEPT VISIT MOD MDM: CPT

## 2020-09-15 PROCEDURE — 80048 BASIC METABOLIC PNL TOTAL CA: CPT | Performed by: EMERGENCY MEDICINE

## 2020-09-15 PROCEDURE — C9803 HOPD COVID-19 SPEC COLLECT: HCPCS

## 2020-09-15 PROCEDURE — 85025 COMPLETE CBC W/AUTO DIFF WBC: CPT | Performed by: EMERGENCY MEDICINE

## 2020-09-15 PROCEDURE — 84484 ASSAY OF TROPONIN QUANT: CPT | Performed by: EMERGENCY MEDICINE

## 2020-09-15 ASSESSMENT — ENCOUNTER SYMPTOMS
FEVER: 0
SHORTNESS OF BREATH: 1

## 2020-09-15 NOTE — ED AVS SNAPSHOT
[facility] Emergency Department  [address]  [facility] [address]  Phone:  [phone]  Fax:  [phone]                                    Jace Pratt   MRN: [id number]    Department:  [facility] Emergency Department   Date of Visit:  9/15/2020           After Visit Summary Signature Page    I have received my discharge instructions, and my questions have been answered. I have discussed any challenges I see with this plan with the nurse or doctor.    ..........................................................................................................................................  Patient/Patient Representative Signature      ..........................................................................................................................................  Patient Representative Print Name and Relationship to Patient    ..................................................               ................................................  Date                                   Time    ..........................................................................................................................................  Reviewed by Signature/Title    ...................................................              ..............................................  Date                                               Time          22EPIC Rev 08/18

## 2020-09-15 NOTE — DISCHARGE INSTRUCTIONS
Discharge Instructions  Chest Pain    You have been seen today for chest pain or discomfort.  At this time, your provider has found no signs that your chest pain is due to a serious or life-threatening condition, (or you have declined more testing and/or admission to the hospital). However, sometimes there is a serious problem that does not show up right away. Your evaluation today may not be complete and you may need further testing and evaluation.     Generally, every Emergency Department visit should have a follow-up clinic visit with either a primary or a specialty clinic/provider. Please follow-up as instructed by your emergency provider today.  Return to the Emergency Department if:  Your chest pain changes, gets worse, starts to happen more often, or comes with less activity.  You are newly short of breath.  You get very weak or tired.  You pass out or faint.  You have any new symptoms, like fever, cough, numb legs, or you cough up blood.  You have anything else that worries you.    Until you follow-up with your regular provider, please do the following:  Take one aspirin daily unless you have an allergy or are told not to by your provider.  If a stress test appointment has been made, go to the appointment.  If you have questions, contact your regular provider.  Follow-up with your regular provider/clinic as directed; this is very important.    If you were given a prescription for medicine here today, be sure to read all of the information (including the package insert) that comes with your prescription.  This will include important information about the medicine, its side effects, and any warnings that you need to know about.  The pharmacist who fills the prescription can provide more information and answer questions you may have about the medicine.  If you have questions or concerns that the pharmacist cannot address, please call or return to the Emergency Department.       Remember that you can always come  back to the Emergency Department if you are not able to see your regular provider in the amount of time listed above, if you get any new symptoms, or if there is anything that worries you.  You need to follow-up with vascular surgery given your thoracic artery ectasia.  Recommend following up with primary to get blood pressure rechecked.  Also ordered a stress test you had done.

## 2020-09-15 NOTE — TELEPHONE ENCOUNTER
Called patient and left voicemail message to call regarding referral from ER for incidental finding on CT of 4.2 cm ascending aortic aneurysm.   Waiting return call.

## 2020-09-15 NOTE — ED PROVIDER NOTES
History     Chief Complaint:  Chest Pain and Shortness of Breath    HPI   Jace Pratt is a 39 year old male anticoagulated on Xarelto with a history of PE who presents with chest pain.  Patient reports the chest pain started a few days ago.  It is on the left side and he reports intermittent numbness into his left arm as well.  He also feels very short of breath with exertion.  He had a saddle embolism secondary to a surgery in his right ankle.  He is no longer on anticoagulation.  He was tested for clotting disorder and it was negative.  He reports this may be feels similar but unclear.  He denies any other health history such as hypertension or diabetes.  Does not smoke currently.  No family history of cardiac disease.  He reports the chest pressure sensation constantly has been present this weekend.  Patient did have a cortisone injection into his ankle which he has never had before.  This occurred on Thursday and his symptoms started on Friday.    Allergies:  Azithromycin  Morphine  Penicillins     Medications:    Wellbutrin  Atarax  Buspar    Past Medical History:    Neuromuscular disorder  Anxiety  Depression   PE    Past Surgical History:    Cholecystectomy  Appendectomy  Hernia repair  Right foot endoscopic plantar fasciotomy, right first dorsiflexory wedge osteotomy, right calcaneal osteotomy  Right wrist surgery  Left tricep tendon repair    Family History:    Depression   Alcoholism    Social History:  Smoking status: Former  Alcohol use: No  Drug use: No  Patient presents alone.  PCP: Luz Rousseau    Marital Status:       Review of Systems   Constitutional: Negative for fever.   Respiratory: Positive for shortness of breath.    Cardiovascular: Positive for chest pain. Negative for leg swelling.   All other systems reviewed and are negative.    Physical Exam     Patient Vitals for the past 24 hrs:   BP Temp Temp src Pulse Resp SpO2 Height Weight   09/14/20 1919 (!) 198/130 98.2  F (36.8  " C) Temporal 70 16 98 % 1.956 m (6' 5\") 131.5 kg (290 lb)      Physical Exam  General: Patient is alert and interactive when I enter the room  Head:  The scalp, face, and head appear normal  Eyes:  Conjunctivae are normal  ENT:    The nose is normal    Pinnae are normal    External acoustic canals are normal  Neck:  Trachea midline  CV:  Pulses are normal, RRR    Resp:  No respiratory distress, CTAB   Abdomen:      Soft, non-tender, non-distended  Musc:  Normal muscular tone    No major joint effusions    No asymmetric leg swelling  Skin:  No rash or lesions noted  Neuro:  Speech is normal and fluent. Face is symmetric.     Moving all extremities well.   Psych: Awake. Alert.  Normal affect.  Appropriate interactions.      Emergency Department Course   ECG:  NSR     Imaging:  Chest XR Port 1 View:  IMPRESSION: Negative chest.  Reading per radiology.    CT Chest Pulmonary Embolism w/ Contrast  Pending  CT Chest Pulmonary Embolism w Contrast   Final Result   IMPRESSION:   1.  Negative for pulmonary embolism. No acute findings in the chest or CT abnormalities to explain the patient's symptoms.      2.  Minor atelectasis in the lungs. No evidence for pneumonitis.      3.  Ectasia of the ascending thoracic aorta measuring 4.2 cm in diameter.      XR Chest Port 1 View   Final Result   IMPRESSION: Negative chest.      Echocardiogram Exercise Stress    (Results Pending)        Radiographic findings were communicated with the patient who voiced understanding of the findings.    Laboratory:  CBC: WBC 10.7, HGB 16.2,       BMP: Glucose 131 (H), o/w WNL (Creatinine: 1.22)     D-dimer: 0.4    1939 Troponin I: <0.015     Nt probnp inpatient: 20       Emergency Department Course:  1933 Nursing notes and vitals reviewed. I performed an exam of the patient as documented above.     EKG was done, interpretation as above.    IV inserted. Blood drawn. This was sent to the lab for further testing, results above.    The patient was " sent for a x-ray and CT while in the emergency department, findings above.     Findings and plan explained to the Patient. Patient discharged home with instructions regarding supportive care, medications, and reasons to return. The importance of close follow-up was reviewed.    I personally reviewed the laboratory and imaging results with the Patient and answered all related questions prior to discharge.      Impression & Plan      Medical Decision Making:  Jace Pratt is a 40 yo who presents with chest pain.  Patient patient presents with chest pain and shortness of breath that started a day after a cortisone shot.  Patient's EKG on arrival revealed normal sinus rhythm with no sign of ischemic changes.  Initial troponin was normal.  The rest of his blood work was normal including a d-dimer.  Given his history of saddle PE we did do a CT PE study.  This revealed no PE but did show ectasia of his thoracic aorta at 4.2 cm.  He is hypertensive here although did improve to 140s over 90.  He did have a stress test in 2019 which was normal.  And interestingly he showed no dilation or echo ectasia of his thoracic aorta at this time.  He also has normal valves.  There is no signs of dissection on CT however with his hypertension we will start him on a beta-blocker.  His heart rate on arrival was in the 70s.  We will start him on a low-dose beta-blocker 25 mg twice daily and I want him to follow-up with his primary to get his blood pressure rechecked.  He should also follow-up with vascular surgery for this incidental thoracic aortic ectasia for surveillance.  I will also order another stress test for him to evaluate any changes.  Patient felt comfortable with plan patient discharged    Diagnosis:    ICD-10-CM    1. Chest pain, unspecified type  R07.9 Echocardiogram Exercise Stress       Disposition:  discharged to home    Discharge Medications:  Discharge Medication List as of 9/14/2020 11:30 PM      START taking these  medications    Details   metoprolol tartrate (LOPRESSOR) 25 MG tablet Take 1 tablet (25 mg) by mouth 2 times daily, Disp-30 tablet,R-0, Local Print             Seble Nicholson  9/14/2020   [facility] EMERGENCY DEPARTMENT    Scribe Disclosure:  I, Seble Nicholson, am serving as a scribe at 7:32 PM on 9/14/2020 to document services personally performed by Charlene Moon MD based on my observations and the provider's statements to me.         Charlene Moon MD  09/15/20 [id number]

## 2020-09-15 NOTE — ED TRIAGE NOTES
Pt having shortness of breath and chest pain off and on for the past 5 days.  Pt says he is winded with exertion.  Cant catch a full breath.

## 2020-09-16 VITALS
RESPIRATION RATE: 17 BRPM | SYSTOLIC BLOOD PRESSURE: 128 MMHG | DIASTOLIC BLOOD PRESSURE: 87 MMHG | TEMPERATURE: 96.8 F | OXYGEN SATURATION: 94 % | HEART RATE: 58 BPM

## 2020-09-16 LAB
INTERPRETATION ECG - MUSE: NORMAL
SARS-COV-2 RNA SPEC QL NAA+PROBE: NOT DETECTED
SPECIMEN SOURCE: NORMAL

## 2020-09-16 PROCEDURE — U0003 INFECTIOUS AGENT DETECTION BY NUCLEIC ACID (DNA OR RNA); SEVERE ACUTE RESPIRATORY SYNDROME CORONAVIRUS 2 (SARS-COV-2) (CORONAVIRUS DISEASE [COVID-19]), AMPLIFIED PROBE TECHNIQUE, MAKING USE OF HIGH THROUGHPUT TECHNOLOGIES AS DESCRIBED BY CMS-2020-01-R: HCPCS | Performed by: EMERGENCY MEDICINE

## 2020-09-16 RX ORDER — ALBUTEROL SULFATE 90 UG/1
2 AEROSOL, METERED RESPIRATORY (INHALATION) EVERY 6 HOURS PRN
Qty: 1 INHALER | Refills: 0 | Status: ON HOLD | OUTPATIENT
Start: 2020-09-16 | End: 2020-12-01

## 2020-09-16 ASSESSMENT — ENCOUNTER SYMPTOMS
SHORTNESS OF BREATH: 1
HEADACHES: 1
COUGH: 0
CHEST TIGHTNESS: 1
CHILLS: 0
FEVER: 0
RHINORRHEA: 0
SORE THROAT: 0

## 2020-09-16 NOTE — ED PROVIDER NOTES
History     Chief Complaint:  Chest Pain    HPI   Jace Pratt is a 39 year old male diagnosed with anxiety, neuromuscular disorder amongst others as noted below, currently on Xarelto, who presents alone for ongoing intermittent chest pain/tightness for the last week, worsening the last few days with associated shortness of breath exacerbated with exertion. Patient was evaluated in the ED yesterday, with the below work up, and ultimately discharged with Metoprolol. He followed up with his PCP today and was told to set up an ECHO, which he has it scheduled for the 23rd and instructed to follow up with vascular surgery. Patient reports he started taking the Metoprolol today, but notes that his symptoms hadn't changed, but didn't improve, thus prompted to present.     Here, patient reports he does have employment that is relatively active and experiences his chest pain during this as well, noting it is exacerbated with movement. He also reports to onset of headache today. He denies any cough, fever, chills, runny nose or sore throat. Of note, patient also reports ongoing left sided abdominal pain for the last month, but denies any changes in defecation. Patient was supposed to undergo a colonoscopy today, but this was rescheduled due to patient's chest pain.     Work Up from ED Visit 9/14/2020:  CBC: WBC 10.7, HGB 16.2,        BMP: Glucose 131 (H), o/w WNL (Creatinine: 1.22)     D-dimer: 0.4     1939 Troponin I: <0.015      Nt probnp inpatient: 20      Chest XR Port 1 View:  IMPRESSION: Negative chest.  Reading per radiology.    CT Chest Pulmonary Embolism w/ Contrast  IMPRESSION:  1.  Negative for pulmonary embolism. No acute findings in the chest or CT abnormalities to explain the patient's symptoms.     2.  Minor atelectasis in the lungs. No evidence for pneumonitis.     3.  Ectasia of the ascending thoracic aorta measuring 4.2 cm in  diameter.    Allergies:  Cefazolin  Azithromycin  Morphine  Penicillins  Bupropion   Oxycodone    Medications:    Wellbutrin  Atarax  Metoprolol  Xarelto    Past Medical History:    Neuromuscular disorder  Acute saddle pulmonary embolism with acute cor pulmonale  Anxiety  Depression    Past Surgical History:    Hernia repair - bilateral  Appendectomy  Cholecystectomy  Foot surgery - right  Wrist surgery - right  Colonoscopy  Right foot endoscopic plantar fasciotomy  Right first dorsiflexory wedge osteotom, right calcaneal osteotomy    Family History:    Father - alcoholism  Mother - depression, vertigo    Social History:  The patient was unaccompanied to the ED.  Smoking Status: Former  Smokeless Tobacco: Current user   Alcohol Use: Not currently  Drug Use: Never   Marital Status:   [2]     Review of Systems   Constitutional: Negative for chills and fever.   HENT: Negative for rhinorrhea and sore throat.    Respiratory: Positive for chest tightness and shortness of breath. Negative for cough.    Cardiovascular: Positive for chest pain.   Neurological: Positive for headaches.   All other systems reviewed and are negative.    Physical Exam     Patient Vitals for the past 24 hrs:   BP Temp Temp src Pulse Resp SpO2   09/16/20 0000 128/87 -- -- 58 17 94 %   09/15/20 2300 133/84 -- -- 57 15 96 %   09/15/20 2159 (!) 132/96 -- -- -- -- --   09/15/20 2158 -- 96.8  F (36  C) Temporal 67 20 100 %       Physical Exam  Constitutional: Alert, attentive, GCS 15  HENT:    Nose: Nose normal.    Mouth/Throat: Oropharynx is clear, mucous membranes are moist  Eyes: EOM are normal, anicteric, conjugate gaze  CV: regular rate and rhythm; no murmurs  Chest: Effort normal and breath sounds clear without wheezing or rales, symmetric bilaterally   GI:  non tender. No distension. No guarding or rebound.    MSK: No LE edema, no tenderness to palpation of BLE.  Neurological: Alert, attentive, moving all extremities equally.   Skin: Skin  is warm and dry.     Emergency Department Course     ECG:  Indication: Chest Pain  ECG taken at , ECG read at 2324 by Dr. Cole MD  Sinus bradycardia  Otherwise normal ECG  No significant changes compared to EKG dated 2020  Rate 57 bpm. OK interval 148. QRS duration 88. QT/QTc 386/375. P-R-T axes 36 26 25.      Laboratory:  Laboratory findings were communicated with the patient who voiced understanding of the findings.    CBC: AWNL (WBC 10.2, HGB 15.9, )  BMP: Glucose 128 (H) o/w WNL (Creatinine 1.06)  Troponin (Collected ): <0.015    Symptomatic COVID-19 (Coronavirus) PCR by Nasopharyngeal Swab: Pending      Emergency Department Course:  Past medical records, nursing notes, and vitals reviewed.    EKG obtained in the ED, see results above.     IV was inserted and blood was drawn for laboratory testing, results above.    (7304)   I performed an exam of the patient as documented above.     A nasal swab was obtained for laboratory testing, findings above.      (0038)   I rechecked the patient and discussed the results of his workup thus far. Discussed plan of care and patient will be discharged.     Findings and plan explained to the Patient. Patient discharged home with instructions regarding supportive care, medications, and reasons to return. The importance of close follow-up was reviewed. The patient was prescribed Albuterol inhaler.     I personally reviewed the laboratory results with the Patient and answered all related questions prior to discharge.     Impression & Plan     Medical Decision Makin-year-old male past medical history significant for hypertension, recent diagnosis ascending thoracic aorta ectasia (4.2 cm), provoked saddle PE now off of anticoagulation, remote history of anxiety presenting for evaluation of noncardiac sounding migratory chest pain.  He was seen 2 days prior for the same study was negative at that time which did show his aortic ectasia.  He was also seen  in primary care today with plan for outpatient stress testing and referral to cardiology/CV surgery.  He returns today for persistence of his migratory at times exertional at times constant chest pain.  His EKG shows no notes of ischemia with sinus bradycardia, his troponin is negative.  He has no adventitial sounds, sats are normal when awake and then mid 90s when sleeping.  No indication for chest imaging.  He is low risk by [facility] ED ACS and heart score.  I do feel he is safe for outpatient stress testing though I do feel this would be appropriate for further risk ratification.  Return precautions were reviewed, recommended continuation of metoprolol, will try him on albuterol and he was discharged home.    Covid-19  Jace Pratt was evaluated during a global COVID-19 pandemic, which necessitated consideration that the patient might be at risk for infection with the SARS-CoV-2 virus that causes COVID-19.   Applicable protocols for evaluation were followed during the patient's care.   COVID-19 was considered as part of the patient's evaluation. The plan for testing is:  a test was obtained during this visit.    Diagnosis:    ICD-10-CM    1. Chest pain, unspecified type  R07.9        Disposition:  Discharged to home.    Discharge Medications:  New Prescriptions    ALBUTEROL (PROAIR HFA/PROVENTIL HFA/VENTOLIN HFA) 108 (90 BASE) MCG/ACT INHALER    Inhale 2 puffs into the lungs every 6 hours as needed for shortness of breath / dyspnea or wheezing     Gideon Galvan MD  [employer]  12:59 AM 09/16/20     Scribe Disclosure:  I, Lary Kuhn, am serving as a scribe at 12:40 AM on 9/16/2020 to document services personally performed by Gideon Galvan MD based on my observations and the provider's statements to me.     9/15/2020   [facility] EMERGENCY DEPARTMENT       Gideon Galvan MD  09/16/20 0059

## 2020-09-16 NOTE — ED TRIAGE NOTES
Pt presents with chest pain and SOB. Seen last night for same thing and told there was something off with his aorta. Saw primary care today who gave him metoprolol. Worsening SOB and chest pain with diaphoresis. Also got flu shot today.

## 2020-09-16 NOTE — TELEPHONE ENCOUNTER
This writer spoke to Dr Meneses and he reccommended patient be followed by cardiologist to monitor aneurysm and evaluation of aortic valve.     Patient returned call and states he does not have a cardiologist, referral made to Dr Moni Barreto at [facility] in [place].  Patient given scheduling number to call and schedule appointment.

## 2020-09-16 NOTE — DISCHARGE INSTRUCTIONS
You should continue with the plan for your stress test.  You should return return should you worsening your chest pain or pass out.    Discharge Instructions  Chest Pain    You have been seen today for chest pain or discomfort.  At this time, your provider has found no signs that your chest pain is due to a serious or life-threatening condition, (or you have declined more testing and/or admission to the hospital). However, sometimes there is a serious problem that does not show up right away. Your evaluation today may not be complete and you may need further testing and evaluation.     Generally, every Emergency Department visit should have a follow-up clinic visit with either a primary or a specialty clinic/provider. Please follow-up as instructed by your emergency provider today.  Return to the Emergency Department if:  Your chest pain changes, gets worse, starts to happen more often, or comes with less activity.  You are newly short of breath.  You get very weak or tired.  You pass out or faint.  You have any new symptoms, like fever, cough, numb legs, or you cough up blood.  You have anything else that worries you.    Until you follow-up with your regular provider, please do the following:  Take one aspirin daily unless you have an allergy or are told not to by your provider.  If a stress test appointment has been made, go to the appointment.  If you have questions, contact your regular provider.  Follow-up with your regular provider/clinic as directed; this is very important.    If you were given a prescription for medicine here today, be sure to read all of the information (including the package insert) that comes with your prescription.  This will include important information about the medicine, its side effects, and any warnings that you need to know about.  The pharmacist who fills the prescription can provide more information and answer questions you may have about the medicine.  If you have questions or  concerns that the pharmacist cannot address, please call or return to the Emergency Department.       Remember that you can always come back to the Emergency Department if you are not able to see your regular provider in the amount of time listed above, if you get any new symptoms, or if there is anything that worries you.

## 2020-11-06 ENCOUNTER — OFFICE VISIT (OUTPATIENT)
Dept: CARDIOLOGY | Facility: CLINIC | Age: 40
End: 2020-11-06
Payer: COMMERCIAL

## 2020-11-06 VITALS
BODY MASS INDEX: 35.89 KG/M2 | DIASTOLIC BLOOD PRESSURE: 87 MMHG | HEART RATE: 60 BPM | SYSTOLIC BLOOD PRESSURE: 131 MMHG | HEIGHT: 77 IN | WEIGHT: 304 LBS

## 2020-11-06 DIAGNOSIS — I83.891 VARICOSE VEINS OF RIGHT LOWER EXTREMITY WITH EDEMA: ICD-10-CM

## 2020-11-06 DIAGNOSIS — I20.89 STABLE ANGINA PECTORIS (H): ICD-10-CM

## 2020-11-06 DIAGNOSIS — I71.21 ASCENDING AORTIC ANEURYSM (H): ICD-10-CM

## 2020-11-06 DIAGNOSIS — I26.02 ACUTE SADDLE PULMONARY EMBOLISM WITH ACUTE COR PULMONALE (H): Primary | ICD-10-CM

## 2020-11-06 PROCEDURE — 99203 OFFICE O/P NEW LOW 30 MIN: CPT | Performed by: INTERNAL MEDICINE

## 2020-11-06 RX ORDER — METOPROLOL SUCCINATE 50 MG/1
50 TABLET, EXTENDED RELEASE ORAL DAILY
Status: ON HOLD | COMMUNITY
Start: 2020-09-22 | End: 2020-12-01

## 2020-11-06 SDOH — HEALTH STABILITY: MENTAL HEALTH: HOW OFTEN DO YOU HAVE A DRINK CONTAINING ALCOHOL?: 2-4 TIMES A MONTH

## 2020-11-06 SDOH — HEALTH STABILITY: MENTAL HEALTH: HOW OFTEN DO YOU HAVE 6 OR MORE DRINKS ON ONE OCCASION?: NOT ASKED

## 2020-11-06 SDOH — HEALTH STABILITY: MENTAL HEALTH: HOW MANY STANDARD DRINKS CONTAINING ALCOHOL DO YOU HAVE ON A TYPICAL DAY?: 1 OR 2

## 2020-11-06 ASSESSMENT — MIFFLIN-ST. JEOR: SCORE: 2406.31

## 2020-11-06 NOTE — PATIENT INSTRUCTIONS
1. Vein comp study  2. Restart xarelto 20 mg daily   3. Calcium score CT of the coronary arteries  4. Echocardiogram   5. Follow up in 3 months

## 2020-11-06 NOTE — PROGRESS NOTES
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     Last few months started where he was having some chest discomfort. Will come out of nowhere. Walking up a flight of stairs today he got winded. Can be sitting at home and gets winded as well, or with talking. Chest pressure can be independent of the SOB. ROS + for longstanding ankle discomfort, no swelling or calf pain, but has large varicose veins of the right leg. Has never  No fevers, chills, nightsweats. Multiple testing for COVID which is negative.      Family history reviewed. Grandmother  from possible pulmonary embolism and stroke. Father's sister at age of 33 years  in her sleep from possible \"silent heart attack\". Unclear about autopsy results. 3 kids - all healthy.     Socially smoker back in 20s. No significant alcohol use.    On ROS: no lens dislocation, normal eye-width, normal uvula, normal palate, +hypermobility in thumb joints, no skin translucency, normal skin, + hernias, no abnormal wound healing, easy bruising or bleeding, no chest wall deformities, no dental crowding, normal stature.     Studies reviewed.    FINAL CONCLUSIONS   Maximal exercise stress test is negative for ischemia.   No diagnostic ECG evidence of ischemia.   The patient had chest discomfort with stress test, probably non-cardiac.   Good exercise tolerance, achieving 10.1 METs and 92.2% of max predicted heart rate.    CT Chest   IMPRESSION:  1.  Negative for pulmonary embolism. No acute findings in the chest.  2.  Stable mild ectasia of the ascending thoracic aorta measuring 4.2 cm.      ASSESSMENT/PLAN:    #. History of Saddle PE - negative CTPE in 2020 at OSH, no longer on rivaroxaban but + family history VTE   #. Right sided varicose veins (deep)   #. Chest pain with negative recent EKG treadmill test but " some ST changes on EKG (diffuse)  #. Family history of stroke, myocardial infarction   #. Newly diagnosed mildly dilated aorta (4.2 cm) without known aortic disease in family, no significant features to suggest CTD other than thumb joint hypermobility     Plan:     1. Vein competancy study to evaluate for DVT, superficial and deep reflux, iliac vein compression syndrome  2. Restart xarelto 20 mg daily especially given history risk COVID exposure with job (and increased risk of recurrent VTE)  3. Calcium score CT of the coronary arteries for evaluation of obstructive CAD given chest pain and family history   4. Echocardiogram for any evidence of pericarditis or constriction  5. Follow up in 3 months with me     Moni Barreto MD MSc  [facility]     PAST MEDICAL HISTORY  Past Medical History:   Diagnosis Date     Neuromuscular disorder (H)        CURRENT MEDICATIONS  Current Outpatient Medications   Medication Sig Dispense Refill     busPIRone (BUSPAR) 15 MG tablet        metoprolol succinate ER (TOPROL-XL) 50 MG 24 hr tablet Take 50 mg by mouth daily       albuterol (PROAIR HFA/PROVENTIL HFA/VENTOLIN HFA) 108 (90 Base) MCG/ACT inhaler Inhale 2 puffs into the lungs every 6 hours as needed for shortness of breath / dyspnea or wheezing (Patient not taking: Reported on 11/6/2020) 1 Inhaler 0       PAST SURGICAL HISTORY:  History reviewed. No pertinent surgical history.    ALLERGIES     Allergies   Allergen Reactions     Bee Pollen Anaphylaxis     Other reaction(s): Unknown Reaction     Cefazolin Hives     Other reaction(s): Hives     Azithromycin Nausea and Vomiting     Latex Hives     Morphine Hives     Penicillins Nausea and Vomiting     Bupropion Other (See Comments)     Made him feel suicidal  Made him feel suicidal         FAMILY HISTORY  Family History   Problem Relation Age of Onset     Clotting Disorder Maternal Grandmother          SOCIAL HISTORY  Social History     Socioeconomic History     Marital  status:      Spouse name: Not on file     Number of children: Not on file     Years of education: Not on file     Highest education level: Not on file   Occupational History     Not on file   Social Needs     Financial resource strain: Not on file     Food insecurity     Worry: Not on file     Inability: Not on file     Transportation needs     Medical: Not on file     Non-medical: Not on file   Tobacco Use     Smoking status: Former Smoker     Types: Cigarettes     Smokeless tobacco: Former User     Quit date: 2019   Substance and Sexual Activity     Alcohol use: Yes     Frequency: 2-4 times a month     Drinks per session: 1 or 2     Drug use: Never     Sexual activity: Not on file   Lifestyle     Physical activity     Days per week: Not on file     Minutes per session: Not on file     Stress: Not on file   Relationships     Social connections     Talks on phone: Not on file     Gets together: Not on file     Attends Buddhism service: Not on file     Active member of club or organization: Not on file     Attends meetings of clubs or organizations: Not on file     Relationship status: Not on file     Intimate partner violence     Fear of current or ex partner: Not on file     Emotionally abused: Not on file     Physically abused: Not on file     Forced sexual activity: Not on file   Other Topics Concern     Parent/sibling w/ CABG, MI or angioplasty before 65F 55M? No   Social History Narrative     Not on file       ROS:   Constitutional: No fever, chills, or sweats. No weight gain/loss   ENT: No visual disturbance, ear ache, epistaxis, sore throat  Allergies/Immunologic: Negative  Respiratory: No cough, hemoptysia  Cardiovascular: As per HPI  GI: No nausea, vomiting, hematemesis, melena, or hematochezia  : No urinary frequency, dysuria, or hematuria  Integument: Negative  Psychiatric: Negative  Neuro: Negative  Endocrinology: Negative   Musculoskeletal: Negative  Vascular: No walking impairment,  "claudication, ischemic rest pain or nonhealing wounds    EXAM:  /87   Pulse 60   Ht 1.956 m (6' 5\")   Wt 137.9 kg (304 lb)   BMI 36.05 kg/m    In general, the patient is a pleasant male in no apparent distress.    HEENT: NC/AT.  PERRLA.  EOMI.  Sclerae white, not injected.  Nares clear.  Pharynx without erythema or exudate.  Dentition intact.    Neck: No adenopathy.  No thyromegaly. Carotids +2/2 bilaterally without bruits.  No jugular venous distension.   Heart: RRR. Normal S1, S2 splits physiologically. No murmur, rub, click, or gallop. The PMI is in the 5th ICS in the midclavicular line. There is no heave.    Lungs: CTA.  No ronchi, wheezes, rales.  No dullness to percussion.   Abdomen: Soft, nontender, nondistended.   Extremities: No clubbing, cyanosis, or edema.  No wounds. + Right sided varicose veins signs of chronic venous insufficiency.   Vascular: No bruits are noted.    Labs:  LIPID RESULTS:  No results found for: CHOL, HDL, LDL, TRIG, CHOLHDLRATIO, NHDL    LIVER ENZYME RESULTS:  Lab Results   Component Value Date    AST 22 04/20/2019    ALT 48 04/20/2019       CBC RESULTS:  Lab Results   Component Value Date    WBC 10.2 09/15/2020    RBC 5.47 09/15/2020    HGB 15.9 09/15/2020    HCT 48.7 09/15/2020    MCV 89 09/15/2020    MCH 29.1 09/15/2020    MCHC 32.6 09/15/2020    RDW 13.4 09/15/2020     09/15/2020       BMP RESULTS:  Lab Results   Component Value Date     09/15/2020    POTASSIUM 4.1 09/15/2020    CHLORIDE 108 09/15/2020    CO2 27 09/15/2020    ANIONGAP 5 09/15/2020     (H) 09/15/2020    BUN 19 09/15/2020    CR 1.06 09/15/2020    GFRESTIMATED 87 09/15/2020    GFRESTBLACK >90 09/15/2020    JANIE 8.9 09/15/2020        A1C RESULTS:  No results found for: A1C      "

## 2020-11-06 NOTE — LETTER
"2020    Luz L Donovanshivacatrina  [facility] [address]  [place] [address]    RE: Jace Pratt       Dear Colleague,    I had the pleasure of seeing Jace Pratt in the [facility].      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     Last few months started where he was having some chest discomfort. Will come out of nowhere. Walking up a flight of stairs today he got winded. Can be sitting at home and gets winded as well, or with talking. Chest pressure can be independent of the SOB. ROS + for longstanding ankle discomfort, no swelling or calf pain, but has large varicose veins of the right leg. Has never  No fevers, chills, nightsweats. Multiple testing for COVID which is negative.      Family history reviewed. Grandmother  from possible pulmonary embolism and stroke. Father's sister at age of 33 years  in her sleep from possible \"silent heart attack\". Unclear about autopsy results. 3 kids - all healthy.     Socially smoker back in 20s. No significant alcohol use.    On ROS: no lens dislocation, normal eye-width, normal uvula, normal palate, +hypermobility in thumb joints, no skin translucency, normal skin, + hernias, no abnormal wound healing, easy bruising or bleeding, no chest wall deformities, no dental crowding, normal stature.     Studies reviewed.    FINAL CONCLUSIONS   Maximal exercise stress test is negative for ischemia.   No diagnostic ECG evidence of ischemia.   The patient had chest discomfort with stress test, probably non-cardiac.   Good exercise tolerance, achieving 10.1 METs and 92.2% of max predicted heart rate.    CT Chest   IMPRESSION:  1.  Negative for pulmonary embolism. No acute findings in the chest.  2.  Stable mild ectasia of the ascending thoracic aorta measuring 4.2 cm.      ASSESSMENT/PLAN:    #. " History of Saddle PE - negative CTPE in 9/2020 at OSH, no longer on rivaroxaban but + family history VTE   #. Right sided varicose veins (deep)   #. Chest pain with negative recent EKG treadmill test but some ST changes on EKG (diffuse)  #. Family history of stroke, myocardial infarction   #. Newly diagnosed mildly dilated aorta (4.2 cm) without known aortic disease in family, no significant features to suggest CTD other than thumb joint hypermobility     Plan:     1. Vein competancy study to evaluate for DVT, superficial and deep reflux, iliac vein compression syndrome  2. Restart xarelto 20 mg daily especially given history risk COVID exposure with job (and increased risk of recurrent VTE)  3. Calcium score CT of the coronary arteries for evaluation of obstructive CAD given chest pain and family history   4. Echocardiogram for any evidence of pericarditis or constriction  5. Follow up in 3 months with me     Moni Barreto MD MSc  [facility]     PAST MEDICAL HISTORY  Past Medical History:   Diagnosis Date     Neuromuscular disorder (H)        CURRENT MEDICATIONS  Current Outpatient Medications   Medication Sig Dispense Refill     busPIRone (BUSPAR) 15 MG tablet        metoprolol succinate ER (TOPROL-XL) 50 MG 24 hr tablet Take 50 mg by mouth daily       albuterol (PROAIR HFA/PROVENTIL HFA/VENTOLIN HFA) 108 (90 Base) MCG/ACT inhaler Inhale 2 puffs into the lungs every 6 hours as needed for shortness of breath / dyspnea or wheezing (Patient not taking: Reported on 11/6/2020) 1 Inhaler 0       PAST SURGICAL HISTORY:  History reviewed. No pertinent surgical history.    ALLERGIES     Allergies   Allergen Reactions     Bee Pollen Anaphylaxis     Other reaction(s): Unknown Reaction     Cefazolin Hives     Other reaction(s): Hives     Azithromycin Nausea and Vomiting     Latex Hives     Morphine Hives     Penicillins Nausea and Vomiting     Bupropion Other (See Comments)     Made him feel suicidal  Made him  feel suicidal         FAMILY HISTORY  Family History   Problem Relation Age of Onset     Clotting Disorder Maternal Grandmother          SOCIAL HISTORY  Social History     Socioeconomic History     Marital status:      Spouse name: Not on file     Number of children: Not on file     Years of education: Not on file     Highest education level: Not on file   Occupational History     Not on file   Social Needs     Financial resource strain: Not on file     Food insecurity     Worry: Not on file     Inability: Not on file     Transportation needs     Medical: Not on file     Non-medical: Not on file   Tobacco Use     Smoking status: Former Smoker     Types: Cigarettes     Smokeless tobacco: Former User     Quit date: 2019   Substance and Sexual Activity     Alcohol use: Yes     Frequency: 2-4 times a month     Drinks per session: 1 or 2     Drug use: Never     Sexual activity: Not on file   Lifestyle     Physical activity     Days per week: Not on file     Minutes per session: Not on file     Stress: Not on file   Relationships     Social connections     Talks on phone: Not on file     Gets together: Not on file     Attends Mormonism service: Not on file     Active member of club or organization: Not on file     Attends meetings of clubs or organizations: Not on file     Relationship status: Not on file     Intimate partner violence     Fear of current or ex partner: Not on file     Emotionally abused: Not on file     Physically abused: Not on file     Forced sexual activity: Not on file   Other Topics Concern     Parent/sibling w/ CABG, MI or angioplasty before 65F 55M? No   Social History Narrative     Not on file       ROS:   Constitutional: No fever, chills, or sweats. No weight gain/loss   ENT: No visual disturbance, ear ache, epistaxis, sore throat  Allergies/Immunologic: Negative  Respiratory: No cough, hemoptysia  Cardiovascular: As per HPI  GI: No nausea, vomiting, hematemesis, melena, or  "hematochezia  : No urinary frequency, dysuria, or hematuria  Integument: Negative  Psychiatric: Negative  Neuro: Negative  Endocrinology: Negative   Musculoskeletal: Negative  Vascular: No walking impairment, claudication, ischemic rest pain or nonhealing wounds    EXAM:  /87   Pulse 60   Ht 1.956 m (6' 5\")   Wt 137.9 kg (304 lb)   BMI 36.05 kg/m    In general, the patient is a pleasant male in no apparent distress.    HEENT: NC/AT.  PERRLA.  EOMI.  Sclerae white, not injected.  Nares clear.  Pharynx without erythema or exudate.  Dentition intact.    Neck: No adenopathy.  No thyromegaly. Carotids +2/2 bilaterally without bruits.  No jugular venous distension.   Heart: RRR. Normal S1, S2 splits physiologically. No murmur, rub, click, or gallop. The PMI is in the 5th ICS in the midclavicular line. There is no heave.    Lungs: CTA.  No ronchi, wheezes, rales.  No dullness to percussion.   Abdomen: Soft, nontender, nondistended.   Extremities: No clubbing, cyanosis, or edema.  No wounds. + Right sided varicose veins signs of chronic venous insufficiency.   Vascular: No bruits are noted.    Labs:  LIPID RESULTS:  No results found for: CHOL, HDL, LDL, TRIG, CHOLHDLRATIO, NHDL    LIVER ENZYME RESULTS:  Lab Results   Component Value Date    AST 22 04/20/2019    ALT 48 04/20/2019       CBC RESULTS:  Lab Results   Component Value Date    WBC 10.2 09/15/2020    RBC 5.47 09/15/2020    HGB 15.9 09/15/2020    HCT 48.7 09/15/2020    MCV 89 09/15/2020    MCH 29.1 09/15/2020    MCHC 32.6 09/15/2020    RDW 13.4 09/15/2020     09/15/2020       BMP RESULTS:  Lab Results   Component Value Date     09/15/2020    POTASSIUM 4.1 09/15/2020    CHLORIDE 108 09/15/2020    CO2 27 09/15/2020    ANIONGAP 5 09/15/2020     (H) 09/15/2020    BUN 19 09/15/2020    CR 1.06 09/15/2020    GFRESTIMATED 87 09/15/2020    GFRESTBLACK >90 09/15/2020    JANIE 8.9 09/15/2020        A1C RESULTS:  No results found for: A1C      Thank " you for allowing me to participate in the care of your patient.    Sincerely,     Moni Barreto MD     [facility]

## 2020-11-07 ENCOUNTER — VIRTUAL VISIT (OUTPATIENT)
Dept: FAMILY MEDICINE | Facility: OTHER | Age: 40
End: 2020-11-07
Payer: COMMERCIAL

## 2020-11-07 PROCEDURE — 99421 OL DIG E/M SVC 5-10 MIN: CPT | Performed by: PHYSICIAN ASSISTANT

## 2020-11-07 NOTE — PROGRESS NOTES
"Date: 2020 15:03:49  Clinician: Cristobal Velasquez  Clinician NPI: [id number]  Patient: Jace Pratt  Patient : 1980  Patient Address: [address] [place] [address]  Patient Phone: [phone]  Visit Protocol: URI  Patient Summary:  Jace is a 40 year old ( : 1980 ) male who initiated a [facility] Visit for COVID-19 (Coronavirus) evaluation and screening. When asked the question \"Please sign me up to receive news, health information and promotions from [facility].\", Jace responded \"No\".    Jace states his symptoms started today.   His symptoms consist of rhinitis, myalgia, chills, malaise, a sore throat, tooth pain, a headache, enlarged lymph nodes, a cough, and nasal congestion. Jace also feels feverish.   Symptom details     Nasal secretions: The color of his mucus is clear.    Cough: Jace coughs a few times an hour and his cough is not more bothersome at night. Phlegm comes into his throat when he coughs. He does not believe his cough is caused by post-nasal drip. The color of the phlegm is yellow.     Sore throat: Jace reports having mild throat pain (1-3 on a 10 point pain scale), does not have exudate on his tonsils, and can swallow liquids. The lymph nodes in his neck are enlarged. A rash has not appeared on the skin since the sore throat started.     Temperature: His current temperature is 98 degrees Fahrenheit.     Headache: He states the headache is mild (1-3 on a 10 point pain scale).     Tooth pain: The tooth pain is not caused by a cavity, recent dental work, or other mouth problems.      Jace denies having vomiting, facial pain or pressure, ageusia, diarrhea, ear pain, wheezing, nausea, and anosmia. He also denies taking antibiotic medication in the past month, having recent facial or sinus surgery in the past 60 days, and having a sinus infection within the past year. He is not experiencing dyspnea.   Precipitating events  Within the past week, Jace has not " been exposed to someone with strep throat. He has not recently been exposed to someone with influenza. Jace has been in close contact with the following high risk individuals: immunocompromised people and children under the age of 5.   Pertinent COVID-19 (Coronavirus) information  Jace does not work or volunteer as healthcare worker or a . 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 Additional job details as reported by the patient (free text): None   In the past 14 days, he has not lived in a congregate living setting.   Jace has had a close contact with a laboratory-confirmed COVID-19 patient within 14 days of symptom onset. He was exposed at his work. He does not know when he was exposed to the laboratory-confirmed COVID-19 patient.   Additional information about contact with COVID-19 (Coronavirus) patient as reported by the patient (free text): Work    Since December 2019, Jace has been tested for COVID-19 and has not had upper respiratory infection or influenza-like illness.      Result of COVID-19 test: Negative     Date of his COVID-19 test: 04/01/2020      Pertinent medical history  Jace needs a return to work/school note.   Weight: 300 lbs   Jace smokes or uses smokeless tobacco.   Weight: 300 lbs    MEDICATIONS: No current medications, ALLERGIES: NKDA  Clinician Response:  Dear Jace,   Your symptoms show that you may have coronavirus (COVID-19). This illness can cause fever, cough and trouble breathing. Many people get a mild case and get better on their own. Some people can get very sick.  What should I do?  We would like to test you for this virus.   1. Please call [phone] to schedule your visit. Explain that you were [facility] to have a COVID-19 test. Be ready to share your [facility] visit ID number.  * If you need to schedule in [facility] please call [phone] or for [place] employees please call [phone].  * If  "you need to schedule in the [place] area please call [phone]. [place] employees call [phone].  The following will serve as your written order for this COVID Test, ordered by me, for the indication of suspected COVID [Z20.828]: The test will be ordered in [employer], our electronic health record, after you are scheduled. It will show as ordered and authorized by Camden Mcmullen MD.  Order: COVID-19 (Coronavirus) PCR for SYMPTOMATIC testing from [facility].   2. When it's time for your COVID test:  Stay at least 6 feet away from others. (If someone will drive you to your test, stay in the backseat, as far away from the  as you can.)   Cover your mouth and nose with a mask, tissue or washcloth.  Go straight to the testing site. Don't make any stops on the way there or back.      3.Starting now: Stay home and away from others (self-isolate) until:   You've had no fever---and no medicine that reduces fever---for one full day (24 hours). And...   Your other symptoms have gotten better. For example, your cough or breathing has improved. And...   At least 10 days have passed since your symptoms started.       During this time, don't leave the house except for testing or medical care.   Stay in your own room, even for meals. Use your own bathroom if you can.   Stay away from others in your home. No hugging, kissing or shaking hands. No visitors.  Don't go to work, school or anywhere else.    Clean \"high touch\" surfaces often (doorknobs, counters, handles, etc.). Use a household cleaning spray or wipes. You'll find a full list of  on the EPA website: www.epa.gov/pesticide-registration/list-n-disinfectants-use-against-sars-cov-2.   Cover your mouth and nose with a mask, tissue or washcloth to avoid spreading germs.  Wash your hands and face often. Use soap and water.  Caregivers in these groups are at risk for severe illness due to COVID-19:  o People 65 years and older  o People who live in a nursing home or long-term " care facility  o People with chronic disease (lung, heart, cancer, diabetes, kidney, liver, immunologic)  o People who have a weakened immune system, including those who:   Are in cancer treatment  Take medicine that weakens the immune system, such as corticosteroids  Had a bone marrow or organ transplant  Have an immune deficiency  Have poorly controlled HIV or AIDS  Are obese (body mass index of 40 or higher)  Smoke regularly   o Caregivers should wear gloves while washing dishes, handling laundry and cleaning bedrooms and bathrooms.  o Use caution when washing and drying laundry: Don't shake dirty laundry, and use the warmest water setting that you can.  o For more tips, go to www.cdc.gov/coronavirus/2019-ncov/downloads/10Things.pdf.    4.Sign up for [employer]. We know it's scary to hear that you might have COVID-19. We want to track your symptoms to make sure you're okay over the next 2 weeks. Please look for an email from [employer]---this is a free, online program that we'll use to keep in touch. To sign up, follow the link in the email. Learn more at [email or web address]  How can I take care of myself?   Get lots of rest. Drink extra fluids (unless a doctor has told you not to).   Take Tylenol (acetaminophen) for fever or pain. If you have liver or kidney problems, ask your family doctor if it's okay to take Tylenol.   Adults can take either:    650 mg (two 325 mg pills) every 4 to 6 hours, or...   1,000 mg (two 500 mg pills) every 8 hours as needed.    Note: Don't take more than 3,000 mg in one day. Acetaminophen is found in many medicines (both prescribed and over-the-counter medicines). Read all labels to be sure you don't take too much.   For children, check the Tylenol bottle for the right dose. The dose is based on the child's age or weight.    If you have other health problems (like cancer, heart failure, an organ transplant or severe kidney disease): Call your specialty clinic if you  don't feel better in the next 2 days.       Know when to call 911. Emergency warning signs include:    Trouble breathing or shortness of breath Pain or pressure in the chest that doesn't go away Feeling confused like you haven't felt before, or not being able to wake up Bluish-colored lips or face.  Where can I get more information?   [facility] -- About COVID-19: [email or web address]   CDC -- What to Do If You're Sick: www.cdc.gov/coronavirus/2019-ncov/about/steps-when-sick.html   CDC -- Ending Home Isolation: www.cdc.gov/coronavirus/2019-ncov/hcp/disposition-in-home-patients.html   CDC -- Caring for Someone: www.cdc.gov/coronavirus/2019-ncov/if-you-are-sick/care-for-someone.html   [facility] -- Interim Guidance for Hospital Discharge to Home: www.health.[facility].[place]./diseases/coronavirus/hcp/hospdischarge.pdf   [facility] clinical trials (COVID-19 research studies): clinicalaffairs.[facility]-clinical-trials    Below are the COVID-19 hotlines at the [facility] ([facility]). Interpreters are available.    For health questions: Call 383-102-3735 or 1-397.133.2788 (7 a.m. to 7 p.m.) For questions about schools and childcare: Call 309-220-3807 or 1-627.433.2140 (7 a.m. to 7 p.m.)    Diagnosis: Contact with and (suspected) exposure to other viral communicable diseases  Diagnosis ICD: Z20.828  Additional Clinician Notes:   If your symptoms are not improving or worsen, please go to one of our urgent care locations for evaluation and possible lab work.

## 2020-11-16 ENCOUNTER — TELEPHONE (OUTPATIENT)
Dept: CARDIOLOGY | Facility: CLINIC | Age: 40
End: 2020-11-16

## 2020-11-18 ENCOUNTER — CARE COORDINATION (OUTPATIENT)
Dept: CARDIOLOGY | Facility: CLINIC | Age: 40
End: 2020-11-18

## 2020-11-18 ENCOUNTER — ANCILLARY PROCEDURE (OUTPATIENT)
Dept: VASCULAR ULTRASOUND | Facility: CLINIC | Age: 40
End: 2020-11-18
Attending: INTERNAL MEDICINE
Payer: COMMERCIAL

## 2020-11-18 ENCOUNTER — HOSPITAL ENCOUNTER (OUTPATIENT)
Dept: CARDIOLOGY | Facility: CLINIC | Age: 40
Discharge: HOME | End: 2020-11-18
Attending: INTERNAL MEDICINE | Admitting: INTERNAL MEDICINE
Payer: COMMERCIAL

## 2020-11-18 DIAGNOSIS — I83.891 VARICOSE VEINS OF RIGHT LOWER EXTREMITY WITH EDEMA: ICD-10-CM

## 2020-11-18 DIAGNOSIS — I20.89 STABLE ANGINA PECTORIS (H): ICD-10-CM

## 2020-11-18 DIAGNOSIS — I26.02 ACUTE SADDLE PULMONARY EMBOLISM WITH ACUTE COR PULMONALE (H): ICD-10-CM

## 2020-11-18 DIAGNOSIS — I20.89 STABLE ANGINA PECTORIS (H): Primary | ICD-10-CM

## 2020-11-18 PROCEDURE — 255N000002 HC RX 255 OP 636: Performed by: INTERNAL MEDICINE

## 2020-11-18 PROCEDURE — 93306 TTE W/DOPPLER COMPLETE: CPT | Mod: 26 | Performed by: INTERNAL MEDICINE

## 2020-11-18 PROCEDURE — 75571 CT HRT W/O DYE W/CA TEST: CPT | Mod: 26 | Performed by: INTERNAL MEDICINE

## 2020-11-18 PROCEDURE — 93970 EXTREMITY STUDY: CPT | Performed by: INTERNAL MEDICINE

## 2020-11-18 PROCEDURE — 75571 CT HRT W/O DYE W/CA TEST: CPT

## 2020-11-18 PROCEDURE — 93306 TTE W/DOPPLER COMPLETE: CPT

## 2020-11-18 RX ADMIN — HUMAN ALBUMIN MICROSPHERES AND PERFLUTREN 9 ML: 10; .22 INJECTION, SOLUTION INTRAVENOUS at 09:30

## 2020-11-18 NOTE — PROGRESS NOTES
CT Calcium Score Findings:   Overall quality of the study: Good.   CORONARY ARTERY CALCIUM SCORES:   Left main coronary artery: 0  Left anterior descending coronary artery: 30.1  Circumflex coronary artery: 0   Right coronary artery: 122  TOTAL CALCIUM SCORE: 152    Non Cardiac Findings-  IMPRESSION:  No significant noncardiac nonvascular findings. Please  see cardiology report for cardiac and vascular findings.      Last OV w/ Dr. Barreto 11/6/20:   ASSESSMENT/PLAN:  #. History of Saddle PE - negative CTPE in 9/2020 at [facility], no longer on rivaroxaban but + family history VTE   #. Right sided varicose veins (deep)   #. Chest pain with negative recent EKG treadmill test but some ST changes on EKG (diffuse)  #. Family history of stroke, myocardial infarction   #. Newly diagnosed mildly dilated aorta (4.2 cm) without known aortic disease in family, no significant features to suggest CTD other than thumb joint hypermobility   Plan:   1. Vein competancy study to evaluate for DVT, superficial and deep reflux, iliac vein compression syndrome  2. Restart xarelto 20 mg daily especially given history risk COVID exposure with job (and increased risk of recurrent VTE)  3. Calcium score CT of the coronary arteries for evaluation of obstructive CAD given chest pain and family history   4. Echocardiogram for any evidence of pericarditis or constriction  5. Follow up in 3 months with me       Will route to her for further recommendations.     DEZ King November 18, 2020 11:58 AM

## 2020-11-18 NOTE — PROGRESS NOTES
Echocardiogram Interpretation Summary:     Technically difficult study. Contrast used with some improvement.  The ascending aorta is mildly dilated at 42 mm. Root size is normal.  The visual ejection fraction is estimated at 60-65%.  Borderline right ventricular enlargement. The right ventricular systolic  function is normal. RV images were suboptimal.  The right atrium is mildly dilated.  Right ventricular systolic pressure could not be approximated due to  inadequate tricuspid regurgitation.  Dilation of the inferior vena cava is present with abnormal respiratory  variation in diameter.  No hemodynamically significant valve disease.  No prior studies for comparison.      Last OV w/ Dr. Barreto 11/6/20-   Plan:      1. Vein competancy study to evaluate for DVT, superficial and deep reflux, iliac vein compression syndrome  2. Restart xarelto 20 mg daily especially given history risk COVID exposure with job (and increased risk of recurrent VTE)  3. Calcium score CT of the coronary arteries for evaluation of obstructive CAD given chest pain and family history   4. Echocardiogram for any evidence of pericarditis or constriction  5. Follow up in 3 months with me       Will route to Dr. Barreto for review.     DEZ King November 18, 2020 2:16 PM

## 2020-11-20 DIAGNOSIS — I26.02 ACUTE SADDLE PULMONARY EMBOLISM WITH ACUTE COR PULMONALE (H): ICD-10-CM

## 2020-11-22 ENCOUNTER — HEALTH MAINTENANCE LETTER (OUTPATIENT)
Age: 40
End: 2020-11-22

## 2020-11-23 NOTE — PROGRESS NOTES
Given his calcium score CT and chest pains and stress test, can we have him undergo a nuclear SPECT this week. If it is positive then we will refer for coronary angiogram. If he would like however to go directly to coronary angiogram I am ok with that given his symptoms. Perhaps we can have him see DOD this week or MADY visit to discuss these two options. -Dr. Barreto       Called nuclear department to see if we do nuclear SPECT imaging at [facility]. Had to leave a voice mail. Once I get confirmation, will call patient and let him know the plan.         DEZ King November 23, 2020 2:05 PM

## 2020-11-23 NOTE — PROGRESS NOTES
Call back from nuclear department stating that nuclear spect is the same as the stress test order. Patient was scheduled for stress echo in September but canceled. Patient had stress test completed in October 2020 through [facility], records in [facility] Everywhere. Will route back to Dr. Barreto to see what testing this patient is needing.       DEZ King November 23, 2020 4:19 PM

## 2020-11-24 NOTE — PROGRESS NOTES
Called patient to follow up on symptoms. Spoke with Dr. Barreto directly regarding imaging orders. Patient will need nuclear medicine stress test with exercise to monitor lesions on heart based on CT calcium score and symptoms. Spoke with patient about the plan and he agreed. Order placed. Patient states that he works for [employer] and is working about 70 hrs per week. He states that he does experience chest pains with exertion daily. Due to this, will put high priority message to scheduling to see if we can get him in for this testing an a follow up visit to review results.         DEZ King November 24, 2020 11:57 AM

## 2020-11-25 ENCOUNTER — HOSPITAL ENCOUNTER (OUTPATIENT)
Dept: CARDIOLOGY | Facility: CLINIC | Age: 40
End: 2020-11-25
Attending: INTERNAL MEDICINE
Payer: COMMERCIAL

## 2020-11-25 VITALS
BODY MASS INDEX: 35.28 KG/M2 | SYSTOLIC BLOOD PRESSURE: 126 MMHG | DIASTOLIC BLOOD PRESSURE: 80 MMHG | WEIGHT: 298.8 LBS | HEART RATE: 67 BPM | HEIGHT: 77 IN | OXYGEN SATURATION: 97 %

## 2020-11-25 DIAGNOSIS — I20.89 STABLE ANGINA PECTORIS (H): ICD-10-CM

## 2020-11-25 LAB
CV STRESS MAX HR HE: 150
NUC STRESS EJECTION FRACTION: 47 %
RATE PRESSURE PRODUCT: NORMAL
STRESS ANGINA INDEX: 1
STRESS ECHO BASELINE DIASTOLIC HE: 80
STRESS ECHO BASELINE HR: 64
STRESS ECHO BASELINE SYSTOLIC BP: 120
STRESS ECHO CALCULATED PERCENT HR: 83 %
STRESS ECHO LAST STRESS DIASTOLIC BP: 78
STRESS ECHO LAST STRESS SYSTOLIC BP: 148
STRESS ECHO POST ESTIMATED WORKLOAD: 12.5 METS
STRESS ECHO POST EXERCISE DUR MIN: 9 MIN
STRESS ECHO POST EXERCISE DUR SEC: 40 SEC
STRESS ECHO TARGET HR: 180

## 2020-11-25 PROCEDURE — 93017 CV STRESS TEST TRACING ONLY: CPT

## 2020-11-25 PROCEDURE — A9502 TC99M TETROFOSMIN: HCPCS | Performed by: INTERNAL MEDICINE

## 2020-11-25 PROCEDURE — 78452 HT MUSCLE IMAGE SPECT MULT: CPT | Mod: 26 | Performed by: INTERNAL MEDICINE

## 2020-11-25 PROCEDURE — 343N000001 HC RX 343: Performed by: INTERNAL MEDICINE

## 2020-11-25 PROCEDURE — 93016 CV STRESS TEST SUPVJ ONLY: CPT | Performed by: INTERNAL MEDICINE

## 2020-11-25 PROCEDURE — 93018 CV STRESS TEST I&R ONLY: CPT | Performed by: INTERNAL MEDICINE

## 2020-11-25 RX ADMIN — TETROFOSMIN 30.8 MCI.: 1.38 INJECTION, POWDER, LYOPHILIZED, FOR SOLUTION INTRAVENOUS at 15:02

## 2020-11-25 RX ADMIN — TETROFOSMIN 10.43 MCI.: 1.38 INJECTION, POWDER, LYOPHILIZED, FOR SOLUTION INTRAVENOUS at 13:10

## 2020-11-25 ASSESSMENT — MIFFLIN-ST. JEOR: SCORE: 2382.73

## 2020-11-25 NOTE — PROGRESS NOTES
Thank you. And can you see if there is an MADY appointment shortly after the stress test he can have done? Thank you. If significant positive stress test will advocate for angiogram. -Dr. Barreto      Will send message to scheduling to get MADY visit as soon as possible. Patient scheduled for stress test today. Order placed for follow up.     Future Appointments   Date Time Provider Department Center   11/25/2020  1:00 PM SCINMINJ [facility]   11/25/2020  1:45 PM SCINM1 [facility]   11/25/2020  2:15 PM SHCVNMTM [facility]   11/25/2020  3:15 PM SCINM1 [facility]          DEZ King November 25, 2020 9:25 AM

## 2020-11-30 ENCOUNTER — OFFICE VISIT (OUTPATIENT)
Dept: CARDIOLOGY | Facility: CLINIC | Age: 40
End: 2020-11-30
Attending: INTERNAL MEDICINE
Payer: COMMERCIAL

## 2020-11-30 ENCOUNTER — HOSPITAL ENCOUNTER (OUTPATIENT)
Facility: CLINIC | Age: 40
End: 2020-11-30
Payer: COMMERCIAL

## 2020-11-30 VITALS
DIASTOLIC BLOOD PRESSURE: 85 MMHG | HEIGHT: 77 IN | BODY MASS INDEX: 35.54 KG/M2 | HEART RATE: 60 BPM | WEIGHT: 301 LBS | SYSTOLIC BLOOD PRESSURE: 131 MMHG

## 2020-11-30 DIAGNOSIS — I83.891 VARICOSE VEINS OF RIGHT LOWER EXTREMITY WITH EDEMA: ICD-10-CM

## 2020-11-30 DIAGNOSIS — Z86.711 HISTORY OF PULMONARY EMBOLISM: ICD-10-CM

## 2020-11-30 DIAGNOSIS — I71.21 ASCENDING AORTIC ANEURYSM (H): Primary | ICD-10-CM

## 2020-11-30 DIAGNOSIS — I20.89 STABLE ANGINA PECTORIS (H): ICD-10-CM

## 2020-11-30 DIAGNOSIS — I25.118 CORONARY ARTERY DISEASE OF NATIVE ARTERY OF NATIVE HEART WITH STABLE ANGINA PECTORIS (H): ICD-10-CM

## 2020-11-30 DIAGNOSIS — Z86.59 HX OF MAJOR DEPRESSION: ICD-10-CM

## 2020-11-30 DIAGNOSIS — R93.1 ELEVATED CORONARY ARTERY CALCIUM SCORE: ICD-10-CM

## 2020-11-30 PROCEDURE — U0003 INFECTIOUS AGENT DETECTION BY NUCLEIC ACID (DNA OR RNA); SEVERE ACUTE RESPIRATORY SYNDROME CORONAVIRUS 2 (SARS-COV-2) (CORONAVIRUS DISEASE [COVID-19]), AMPLIFIED PROBE TECHNIQUE, MAKING USE OF HIGH THROUGHPUT TECHNOLOGIES AS DESCRIBED BY CMS-2020-01-R: HCPCS | Performed by: PHYSICIAN ASSISTANT

## 2020-11-30 PROCEDURE — 99214 OFFICE O/P EST MOD 30 MIN: CPT | Performed by: PHYSICIAN ASSISTANT

## 2020-11-30 RX ORDER — ISOSORBIDE MONONITRATE 30 MG/1
30 TABLET, EXTENDED RELEASE ORAL DAILY
Qty: 30 TABLET | Refills: 11 | Status: SHIPPED | OUTPATIENT
Start: 2020-11-30 | End: 2020-12-15

## 2020-11-30 RX ORDER — ATORVASTATIN CALCIUM 40 MG/1
40 TABLET, FILM COATED ORAL DAILY
Qty: 90 TABLET | Refills: 3 | Status: SHIPPED | OUTPATIENT
Start: 2020-11-30 | End: 2020-12-15

## 2020-11-30 ASSESSMENT — MIFFLIN-ST. JEOR: SCORE: 2392.71

## 2020-11-30 NOTE — LETTER
11/30/2020    [facility] [address] [place]  [place] [address]    RE: Jace Pratt       Dear Colleague,    I had the pleasure of seeing Jace Pratt in the [facility].    [id number]  HPI and Plan:   See dictation    Orders this Visit:  Orders Placed This Encounter   Procedures     Asymptomatic COVID-19 Virus (Coronavirus) by PCR     Orders Placed This Encounter   Medications     isosorbide mononitrate (IMDUR) 30 MG 24 hr tablet     Sig: Take 1 tablet (30 mg) by mouth daily     Dispense:  30 tablet     Refill:  11     atorvastatin (LIPITOR) 40 MG tablet     Sig: Take 1 tablet (40 mg) by mouth daily     Dispense:  90 tablet     Refill:  3     There are no discontinued medications.      Encounter Diagnoses   Name Primary?     Stable angina pectoris (H)      Ascending aortic aneurysm (H) Yes     Varicose veins of right lower extremity with edema      Coronary artery disease of native artery of native heart with stable angina pectoris (H)      Elevated coronary artery calcium score      History of pulmonary embolism      Hx of major depression        CURRENT MEDICATIONS:  Current Outpatient Medications   Medication Sig Dispense Refill     atorvastatin (LIPITOR) 40 MG tablet Take 1 tablet (40 mg) by mouth daily 90 tablet 3     busPIRone (BUSPAR) 15 MG tablet        isosorbide mononitrate (IMDUR) 30 MG 24 hr tablet Take 1 tablet (30 mg) by mouth daily 30 tablet 11     metoprolol succinate ER (TOPROL-XL) 50 MG 24 hr tablet Take 50 mg by mouth daily       rivaroxaban ANTICOAGULANT (XARELTO) 20 MG TABS tablet Take 1 tablet (20 mg) by mouth daily (with dinner) 90 tablet 0     albuterol (PROAIR HFA/PROVENTIL HFA/VENTOLIN HFA) 108 (90 Base) MCG/ACT inhaler Inhale 2 puffs into the lungs every 6 hours as needed for shortness of breath / dyspnea or wheezing (Patient not taking: Reported on 11/6/2020) 1 Inhaler 0       ALLERGIES     Allergies   Allergen Reactions      Bee Pollen Anaphylaxis     Other reaction(s): Unknown Reaction     Cefazolin Hives     Other reaction(s): Hives     Azithromycin Nausea and Vomiting     Latex Hives     Morphine Hives     Penicillins Nausea and Vomiting     Bupropion Other (See Comments)     Made him feel suicidal  Made him feel suicidal         PAST MEDICAL HISTORY:  Past Medical History:   Diagnosis Date     Acute saddle pulmonary embolism with acute cor pulmonale (H)      Anxiety      Ascending aortic aneurysm (H)      Depression      Former smoker      Neuromuscular disorder (H)      Stable angina (H)      Varicose veins of right lower extremity with edema        PAST SURGICAL HISTORY:  No past surgical history on file.    FAMILY HISTORY:  Family History   Problem Relation Age of Onset     Clotting Disorder Maternal Grandmother        SOCIAL HISTORY:  Social History     Socioeconomic History     Marital status:      Spouse name: None     Number of children: None     Years of education: None     Highest education level: None   Occupational History     None   Social Needs     Financial resource strain: None     Food insecurity     Worry: None     Inability: None     Transportation needs     Medical: None     Non-medical: None   Tobacco Use     Smoking status: Former Smoker     Types: Cigarettes     Smokeless tobacco: Former User     Quit date: 2019   Substance and Sexual Activity     Alcohol use: Yes     Frequency: 2-4 times a month     Drinks per session: 1 or 2     Drug use: Never     Sexual activity: None   Lifestyle     Physical activity     Days per week: None     Minutes per session: None     Stress: None   Relationships     Social connections     Talks on phone: None     Gets together: None     Attends Denominational service: None     Active member of club or organization: None     Attends meetings of clubs or organizations: None     Relationship status: None     Intimate partner violence     Fear of current or ex partner: None      "Emotionally abused: None     Physically abused: None     Forced sexual activity: None   Other Topics Concern     Parent/sibling w/ CABG, MI or angioplasty before 65F 55M? No   Social History Narrative     None       Review of Systems:  Skin:  Negative     Eyes:  Negative    ENT:  Negative    Respiratory:  Positive for dyspnea on exertion  Cardiovascular:    Positive for;heaviness  Gastroenterology: Negative    Genitourinary:  not assessed    Musculoskeletal:  Negative    Neurologic:  Negative    Psychiatric:  Negative    Heme/Lymph/Imm:  Negative    Endocrine:  Negative      Physical Exam:  Vitals: /85   Pulse 60   Ht 1.956 m (6' 5\")   Wt 136.5 kg (301 lb)   BMI 35.69 kg/m     Please refer to dictation for physical exam    Recent Lab Results:  LIPID RESULTS:  No results found for: CHOL, HDL, LDL, TRIG, CHOLHDLRATIO    LIVER ENZYME RESULTS:  Lab Results   Component Value Date    AST 22 04/20/2019    ALT 48 04/20/2019       CBC RESULTS:  Lab Results   Component Value Date    WBC 10.2 09/15/2020    RBC 5.47 09/15/2020    HGB 15.9 09/15/2020    HCT 48.7 09/15/2020    MCV 89 09/15/2020    MCH 29.1 09/15/2020    MCHC 32.6 09/15/2020    RDW 13.4 09/15/2020     09/15/2020       BMP RESULTS:  Lab Results   Component Value Date     09/15/2020    POTASSIUM 4.1 09/15/2020    CHLORIDE 108 09/15/2020    CO2 27 09/15/2020    ANIONGAP 5 09/15/2020     (H) 09/15/2020    BUN 19 09/15/2020    CR 1.06 09/15/2020    GFRESTIMATED 87 09/15/2020    GFRESTBLACK >90 09/15/2020    JANIE 8.9 09/15/2020        A1C RESULTS:  No results found for: A1C    INR RESULTS:  Lab Results   Component Value Date    INR 1.29 (H) 04/20/2019           CC  Moni Barreto MD  [address] [place] [id number]          Thank you for allowing me to participate in the care of your patient.      Sincerely,     Quynh Waterman PA-C     [facility]    cc:   Moni Barreto MD  [address] [place] [address]" [place] [address]

## 2020-11-30 NOTE — PROGRESS NOTES
[id number]  HPI and Plan:   See dictation    Orders this Visit:  Orders Placed This Encounter   Procedures     Asymptomatic COVID-19 Virus (Coronavirus) by PCR     Orders Placed This Encounter   Medications     isosorbide mononitrate (IMDUR) 30 MG 24 hr tablet     Sig: Take 1 tablet (30 mg) by mouth daily     Dispense:  30 tablet     Refill:  11     atorvastatin (LIPITOR) 40 MG tablet     Sig: Take 1 tablet (40 mg) by mouth daily     Dispense:  90 tablet     Refill:  3     There are no discontinued medications.      Encounter Diagnoses   Name Primary?     Stable angina pectoris (H)      Ascending aortic aneurysm (H) Yes     Varicose veins of right lower extremity with edema      Coronary artery disease of native artery of native heart with stable angina pectoris (H)      Elevated coronary artery calcium score      History of pulmonary embolism      Hx of major depression        CURRENT MEDICATIONS:  Current Outpatient Medications   Medication Sig Dispense Refill     atorvastatin (LIPITOR) 40 MG tablet Take 1 tablet (40 mg) by mouth daily 90 tablet 3     busPIRone (BUSPAR) 15 MG tablet        isosorbide mononitrate (IMDUR) 30 MG 24 hr tablet Take 1 tablet (30 mg) by mouth daily 30 tablet 11     metoprolol succinate ER (TOPROL-XL) 50 MG 24 hr tablet Take 50 mg by mouth daily       rivaroxaban ANTICOAGULANT (XARELTO) 20 MG TABS tablet Take 1 tablet (20 mg) by mouth daily (with dinner) 90 tablet 0     albuterol (PROAIR HFA/PROVENTIL HFA/VENTOLIN HFA) 108 (90 Base) MCG/ACT inhaler Inhale 2 puffs into the lungs every 6 hours as needed for shortness of breath / dyspnea or wheezing (Patient not taking: Reported on 11/6/2020) 1 Inhaler 0       ALLERGIES     Allergies   Allergen Reactions     Bee Pollen Anaphylaxis     Other reaction(s): Unknown Reaction     Cefazolin Hives     Other reaction(s): Hives     Azithromycin Nausea and Vomiting     Latex Hives     Morphine Hives     Penicillins Nausea and Vomiting     Bupropion Other  (See Comments)     Made him feel suicidal  Made him feel suicidal         PAST MEDICAL HISTORY:  Past Medical History:   Diagnosis Date     Acute saddle pulmonary embolism with acute cor pulmonale (H)      Anxiety      Ascending aortic aneurysm (H)      Depression      Former smoker      Neuromuscular disorder (H)      Stable angina (H)      Varicose veins of right lower extremity with edema        PAST SURGICAL HISTORY:  No past surgical history on file.    FAMILY HISTORY:  Family History   Problem Relation Age of Onset     Clotting Disorder Maternal Grandmother        SOCIAL HISTORY:  Social History     Socioeconomic History     Marital status:      Spouse name: None     Number of children: None     Years of education: None     Highest education level: None   Occupational History     None   Social Needs     Financial resource strain: None     Food insecurity     Worry: None     Inability: None     Transportation needs     Medical: None     Non-medical: None   Tobacco Use     Smoking status: Former Smoker     Types: Cigarettes     Smokeless tobacco: Former User     Quit date: 2019   Substance and Sexual Activity     Alcohol use: Yes     Frequency: 2-4 times a month     Drinks per session: 1 or 2     Drug use: Never     Sexual activity: None   Lifestyle     Physical activity     Days per week: None     Minutes per session: None     Stress: None   Relationships     Social connections     Talks on phone: None     Gets together: None     Attends [employer] service: None     Active member of club or organization: None     Attends meetings of clubs or organizations: None     Relationship status: None     Intimate partner violence     Fear of current or ex partner: None     Emotionally abused: None     Physically abused: None     Forced sexual activity: None   Other Topics Concern     Parent/sibling w/ CABG, MI or angioplasty before 65F 55M? No   Social History Narrative     None       Review of Systems:  Skin:   "Negative     Eyes:  Negative    ENT:  Negative    Respiratory:  Positive for dyspnea on exertion  Cardiovascular:    Positive for;heaviness  Gastroenterology: Negative    Genitourinary:  not assessed    Musculoskeletal:  Negative    Neurologic:  Negative    Psychiatric:  Negative    Heme/Lymph/Imm:  Negative    Endocrine:  Negative      Physical Exam:  Vitals: /85   Pulse 60   Ht 1.956 m (6' 5\")   Wt 136.5 kg (301 lb)   BMI 35.69 kg/m     Please refer to dictation for physical exam    Recent Lab Results:  LIPID RESULTS:  No results found for: CHOL, HDL, LDL, TRIG, CHOLHDLRATIO    LIVER ENZYME RESULTS:  Lab Results   Component Value Date    AST 22 04/20/2019    ALT 48 04/20/2019       CBC RESULTS:  Lab Results   Component Value Date    WBC 10.2 09/15/2020    RBC 5.47 09/15/2020    HGB 15.9 09/15/2020    HCT 48.7 09/15/2020    MCV 89 09/15/2020    MCH 29.1 09/15/2020    MCHC 32.6 09/15/2020    RDW 13.4 09/15/2020     09/15/2020       BMP RESULTS:  Lab Results   Component Value Date     09/15/2020    POTASSIUM 4.1 09/15/2020    CHLORIDE 108 09/15/2020    CO2 27 09/15/2020    ANIONGAP 5 09/15/2020     (H) 09/15/2020    BUN 19 09/15/2020    CR 1.06 09/15/2020    GFRESTIMATED 87 09/15/2020    GFRESTBLACK >90 09/15/2020    JANIE 8.9 09/15/2020        A1C RESULTS:  No results found for: A1C    INR RESULTS:  Lab Results   Component Value Date    INR 1.29 (H) 04/20/2019           CC  Moni Barreto MD  [address] [place] [id number]      "

## 2020-11-30 NOTE — LETTER
11/30/2020      Luz Urbanocatrina  [facility] [place] [address] [place] [address]      RE: Jaec Pratt       Dear Colleague,    I had the pleasure of seeing Jace Pratt in the [facility].    Service Date: 11/30/2020      CLINIC VISIT      PRIMARY CARDIOLOGIST:  Dr. Barreto      REASON FOR VISIT:  Abnormal stress test, abnormal calcium score.      HISTORY OF PRESENT ILLNESS:  Mr. Pratt is a pleasant 40-year-old gentleman with past medical history significant for saddle pulmonary embolus requiring catheter-directed lytic at [facility] in 03/2019, family history of PE and CVA as well as sudden cardiac death, varicose veins, depression and anxiety, ascending aortic aneurysm at 4.2 cm.  Had presented to Dr. Barreto for concerns of chest discomfort.  He underwent a calcium score that had a score of 122 in the RCA and 30 in the LAD for a total score of 152, placing him in the 99th percentile for age and gender-match control.  He then underwent treadmill nuclear stress test where he had an inferior and inferolateral defect that was consistent with infarct and an EF of 47%.  He had an echocardiogram that demonstrated a normal EF but a dilated IVC and borderline RV enlargement.  The ascending aorta, again, was noted to be 4.2 cm.      He comes in to discuss these findings.  He unfortunately continues to feel very poorly.  He continues to have chest pain multiple times a day and is perhaps becoming more frequent.  It is perhaps 2-3 times a day.  He notes now that any time he climbs up stairs, especially if he is carrying one of his young children, he develops chest pain.  This is often associated with shortness of breath.  Yesterday, they were working on getting family pictures done with the 5 children under the age of 11 of his blended family and he developed left chest pain that was about 6/10 to 7/10 that radiated into his left arm.  He said it lasted about an  hour and a half.  Was associated with shortness of breath but not nausea or diaphoresis.  He sat and rested and it eventually resolved.  He has not had any syncope or presyncope but this pattern is persistent and consistent with exercise and resolving with rest.  He also thinks his anxiety triggers chest pain as well.      His work is very stressful.  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  He develops chest pain multiple times a day at work.  He is also concerned, as a company, the COVID protocols do not seem to be followed closely.      SOCIAL HISTORY:  He does chew tobacco.  Did not discuss alcohol.  He is engaged to his nirav Karen.  Between the 2 of them, they have 5 children.  He has 3 from previous relationship and she has 2.       PHYSICAL EXAMINATION:   GENERAL:  Well-developed, well-nourished gentleman in no acute distress.   HEENT:  Normocephalic, atraumatic.   HEART:  Regular in rate and rhythm.  I do not appreciate murmur, rub or gallop.   LUNGS:  Clear without wheezes, rales or rhonchi.   EXTREMITIES:  Without peripheral edema.  Has 2+ radial, ulnar and femoral pulses bilaterally, all without bruit.   SKIN:  Warm and dry.      ASSESSMENT AND PLAN:   1.  Abnormal stress test and elevated coronary calcium score with symptoms consistent with progressive angina.  At this point, ideally, I would admit him to the hospital but given the lack of available beds due to the covid pandemic and the fact that he is pain-free and on Xarelto, I will allow him to stay home.  I reviewed with Dr. Barreto who agrees with coronary angiogram.  This will be arranged for Wednesday at which time he will have been off his Xarelto for 48 hours.  In addition, I am asking him to stay off work immediately, as I want his chest pain to settle down and I want him to rest.  I am starting him on Imdur 30 mg daily, aspirin 81 mg daily and Lipitor 40 mg daily.   Risks and benefits of coronary angiogram were discussed, up to and including bruising, bleeding, contrast allergy, dialysis, stroke and heart attack as well as emergency open heart surgery and death.  He is agreeable to this plan.  Again, he last took Xarelto yesterday .  He will not take it today or tomorrow and have an angiogram on Wednesday.  I did discuss that if he develops chest pain, I want him in the ER and if we needed to, we could move up his angiogram.  He does not have any COVID symptoms, although we will get the standard pre-COVID testing.   2.  Venous insufficiency noted on vein study.  We will follow more deeply in the future.  No clot noted.   3.  Dilated IVC noted on echocardiogram.  Depending on findings in coronary angiogram, could consider gentle diuretic in this gentleman.   4.  Likely dyslipidemia.  Given new diagnosis of coronary artery disease, I did start him on statin as above.  Will need repeat lipid panel in about 8-12 weeks.      Thank you for allowing me to participate in this delightful patient's care.  We will see him back in about 2 weeks, sooner with concerns.      Quynh Waterman PA-C        D: 2020   T: 2020   MT: SANCHEZ      Name:     ANKUSH QUINTERO   MRN:      [id number]        Account:      [id number]   :      1980           Service Date: 2020      Document: [id number]        Outpatient Encounter Medications as of 2020   Medication Sig Dispense Refill     atorvastatin (LIPITOR) 40 MG tablet Take 1 tablet (40 mg) by mouth daily 90 tablet 3     isosorbide mononitrate (IMDUR) 30 MG 24 hr tablet Take 1 tablet (30 mg) by mouth daily 30 tablet 11     rivaroxaban ANTICOAGULANT (XARELTO) 20 MG TABS tablet Take 1 tablet (20 mg) by mouth daily (with dinner) 90 tablet 0     [DISCONTINUED] busPIRone (BUSPAR) 15 MG tablet        [DISCONTINUED] metoprolol succinate ER (TOPROL-XL) 50 MG 24 hr tablet Take 50 mg by mouth daily       [DISCONTINUED] albuterol (PROAIR  HFA/PROVENTIL HFA/VENTOLIN HFA) 108 (90 Base) MCG/ACT inhaler Inhale 2 puffs into the lungs every 6 hours as needed for shortness of breath / dyspnea or wheezing (Patient not taking: Reported on 11/6/2020) 1 Inhaler 0     No facility-administered encounter medications on file as of 11/30/2020.        Again, thank you for allowing me to participate in the care of your patient.      Sincerely,    Quynh Waterman PA-C     [facility]

## 2020-11-30 NOTE — PATIENT INSTRUCTIONS
Thank you for coming into [facility] today.    We discussed: we'll set you up for an angiogram to look at your heart arteries.     Medication changes:  Stop taking Xarelto today.    Start taking aspirin 81 mg once a day in the morning.   Start taking Imdur/ isosorbide mononitrate 30 mg once a day.  Start taking atorvastatin 40 mg daily at night.      We'll schedule an angiogram to look at your heart arteries directly.       Please call my nurse at [phone] with any questions or concerns.    Scheduling phone number: [phone]  Reminder: Please bring in all current medications, over the counter supplements and vitamin bottles to your next appointment.

## 2020-11-30 NOTE — PROGRESS NOTES
Service Date: 11/30/2020      CLINIC VISIT      PRIMARY CARDIOLOGIST:  Dr. Barreto      REASON FOR VISIT:  Abnormal stress test, abnormal calcium score.      HISTORY OF PRESENT ILLNESS:  Mr. Pratt is a pleasant 40-year-old gentleman with past medical history significant for saddle pulmonary embolus requiring catheter-directed lytic at [facility] in 03/2019, family history of PE and CVA as well as sudden cardiac death, varicose veins, depression and anxiety, ascending aortic aneurysm at 4.2 cm.  Had presented to Dr. Barreto for concerns of chest discomfort.  He underwent a calcium score that had a score of 122 in the RCA and 30 in the LAD for a total score of 152, placing him in the 99th percentile for age and gender-match control.  He then underwent treadmill nuclear stress test where he had an inferior and inferolateral defect that was consistent with infarct and an EF of 47%.  He had an echocardiogram that demonstrated a normal EF but a dilated IVC and borderline RV enlargement.  The ascending aorta, again, was noted to be 4.2 cm.      He comes in to discuss these findings.  He unfortunately continues to feel very poorly.  He continues to have chest pain multiple times a day and is perhaps becoming more frequent.  It is perhaps 2-3 times a day.  He notes now that any time he climbs up stairs, especially if he is carrying one of his young children, he develops chest pain.  This is often associated with shortness of breath.  Yesterday, they were working on getting family pictures done with the 5 children under the age of 11 of his blended family and he developed left chest pain that was about 6/10 to 7/10 that radiated into his left arm.  He said it lasted about an hour and a half.  Was associated with shortness of breath but not nausea or diaphoresis.  He sat and rested and it eventually resolved.  He has not had any syncope or presyncope but this pattern is persistent and consistent with exercise and resolving  with rest.  He also thinks his anxiety triggers chest pain as well.      His work is very stressful.  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  He develops chest pain multiple times a day at work.  He is also concerned, as a company, the COVID protocols do not seem to be followed closely.      SOCIAL HISTORY:  He does chew tobacco.  Did not discuss alcohol.  He is engaged to his donnyeKaren.  Between the 2 of them, they have 5 children.  He has 3 from previous relationship and she has 2.       PHYSICAL EXAMINATION:   GENERAL:  Well-developed, well-nourished gentleman in no acute distress.   HEENT:  Normocephalic, atraumatic.   HEART:  Regular in rate and rhythm.  I do not appreciate murmur, rub or gallop.   LUNGS:  Clear without wheezes, rales or rhonchi.   EXTREMITIES:  Without peripheral edema.  Has 2+ radial, ulnar and femoral pulses bilaterally, all without bruit.   SKIN:  Warm and dry.      ASSESSMENT AND PLAN:   1.  Abnormal stress test and elevated coronary calcium score with symptoms consistent with progressive angina.  At this point, ideally, I would admit him to the hospital but given the lack of available beds due to the covid pandemic and the fact that he is pain-free and on Xarelto, I will allow him to stay home.  I reviewed with Dr. Barreto who agrees with coronary angiogram.  This will be arranged for Wednesday at which time he will have been off his Xarelto for 48 hours.  In addition, I am asking him to stay off work immediately, as I want his chest pain to settle down and I want him to rest.  I am starting him on Imdur 30 mg daily, aspirin 81 mg daily and Lipitor 40 mg daily.  Risks and benefits of coronary angiogram were discussed, up to and including bruising, bleeding, contrast allergy, dialysis, stroke and heart attack as well as emergency open heart surgery and death.  He is agreeable to this plan.  Again, he last took  Xarelto yesterday .  He will not take it today or tomorrow and have an angiogram on Wednesday.  I did discuss that if he develops chest pain, I want him in the ER and if we needed to, we could move up his angiogram.  He does not have any COVID symptoms, although we will get the standard pre-COVID testing.   2.  Venous insufficiency noted on vein study.  We will follow more deeply in the future.  No clot noted.   3.  Dilated IVC noted on echocardiogram.  Depending on findings in coronary angiogram, could consider gentle diuretic in this gentleman.   4.  Likely dyslipidemia.  Given new diagnosis of coronary artery disease, I did start him on statin as above.  Will need repeat lipid panel in about 8-12 weeks.      Thank you for allowing me to participate in this delightful patient's care.  We will see him back in about 2 weeks, sooner with concerns.      FELICITY Beebe PA-C             D: 2020   T: 2020   MT: SANCHEZ      Name:     ANKUSH QUINTERO   MRN:      [id number]        Account:      [id number]   :      1980           Service Date: 2020      Document: [id number]

## 2020-12-01 ENCOUNTER — DOCUMENTATION ONLY (OUTPATIENT)
Dept: CARDIOLOGY | Facility: CLINIC | Age: 40
End: 2020-12-01

## 2020-12-01 ENCOUNTER — HOSPITAL ENCOUNTER (OUTPATIENT)
Facility: CLINIC | Age: 40
Setting detail: OBSERVATION
Discharge: HOME | End: 2020-12-01
Attending: EMERGENCY MEDICINE | Admitting: INTERNAL MEDICINE
Payer: COMMERCIAL

## 2020-12-01 ENCOUNTER — APPOINTMENT (OUTPATIENT)
Dept: GENERAL RADIOLOGY | Facility: CLINIC | Age: 40
End: 2020-12-01
Attending: EMERGENCY MEDICINE
Payer: COMMERCIAL

## 2020-12-01 VITALS
HEIGHT: 77 IN | RESPIRATION RATE: 16 BRPM | WEIGHT: 300 LBS | TEMPERATURE: 97.8 F | OXYGEN SATURATION: 97 % | HEART RATE: 55 BPM | SYSTOLIC BLOOD PRESSURE: 144 MMHG | BODY MASS INDEX: 35.42 KG/M2 | DIASTOLIC BLOOD PRESSURE: 76 MMHG

## 2020-12-01 DIAGNOSIS — R07.9 CHEST PAIN, UNSPECIFIED TYPE: Primary | ICD-10-CM

## 2020-12-01 DIAGNOSIS — R07.9 CHEST PAIN, UNSPECIFIED TYPE: ICD-10-CM

## 2020-12-01 DIAGNOSIS — R93.1 ELEVATED CORONARY ARTERY CALCIUM SCORE: Primary | ICD-10-CM

## 2020-12-01 LAB
ANION GAP SERPL CALCULATED.3IONS-SCNC: 6 MMOL/L (ref 3–14)
BASOPHILS # BLD AUTO: 0 10E9/L (ref 0–0.2)
BASOPHILS NFR BLD AUTO: 0.1 %
BUN SERPL-MCNC: 16 MG/DL (ref 7–30)
CALCIUM SERPL-MCNC: 8.8 MG/DL (ref 8.5–10.1)
CHLORIDE SERPL-SCNC: 106 MMOL/L (ref 94–109)
CO2 SERPL-SCNC: 27 MMOL/L (ref 20–32)
CREAT SERPL-MCNC: 1.17 MG/DL (ref 0.66–1.25)
DIFFERENTIAL METHOD BLD: NORMAL
EOSINOPHIL # BLD AUTO: 0.2 10E9/L (ref 0–0.7)
EOSINOPHIL NFR BLD AUTO: 2 %
ERYTHROCYTE [DISTWIDTH] IN BLOOD BY AUTOMATED COUNT: 13.4 % (ref 10–15)
GFR SERPL CREATININE-BSD FRML MDRD: 77 ML/MIN/{1.73_M2}
GLUCOSE SERPL-MCNC: 110 MG/DL (ref 70–99)
HCT VFR BLD AUTO: 48.5 % (ref 40–53)
HGB BLD-MCNC: 16.4 G/DL (ref 13.3–17.7)
IMM GRANULOCYTES # BLD: 0 10E9/L (ref 0–0.4)
IMM GRANULOCYTES NFR BLD: 0.5 %
INTERPRETATION ECG - MUSE: NORMAL
LABORATORY COMMENT REPORT: NORMAL
LYMPHOCYTES # BLD AUTO: 3.1 10E9/L (ref 0.8–5.3)
LYMPHOCYTES NFR BLD AUTO: 42.1 %
MCH RBC QN AUTO: 29.5 PG (ref 26.5–33)
MCHC RBC AUTO-ENTMCNC: 33.8 G/DL (ref 31.5–36.5)
MCV RBC AUTO: 87 FL (ref 78–100)
MONOCYTES # BLD AUTO: 0.7 10E9/L (ref 0–1.3)
MONOCYTES NFR BLD AUTO: 9.7 %
NEUTROPHILS # BLD AUTO: 3.4 10E9/L (ref 1.6–8.3)
NEUTROPHILS NFR BLD AUTO: 45.6 %
NRBC # BLD AUTO: 0 10*3/UL
NRBC BLD AUTO-RTO: 0 /100
PLATELET # BLD AUTO: 195 10E9/L (ref 150–450)
POTASSIUM SERPL-SCNC: 4 MMOL/L (ref 3.4–5.3)
RBC # BLD AUTO: 5.56 10E12/L (ref 4.4–5.9)
SARS-COV-2 RNA SPEC QL NAA+PROBE: NEGATIVE
SARS-COV-2 RNA SPEC QL NAA+PROBE: NORMAL
SODIUM SERPL-SCNC: 139 MMOL/L (ref 133–144)
SPECIMEN SOURCE: NORMAL
SPECIMEN SOURCE: NORMAL
TROPONIN I SERPL-MCNC: <0.015 UG/L (ref 0–0.04)
TROPONIN I SERPL-MCNC: <0.015 UG/L (ref 0–0.04)
WBC # BLD AUTO: 7.4 10E9/L (ref 4–11)

## 2020-12-01 PROCEDURE — 250N000011 HC RX IP 250 OP 636: Performed by: INTERNAL MEDICINE

## 2020-12-01 PROCEDURE — 93452 LEFT HRT CATH W/VENTRCLGRPHY: CPT | Performed by: INTERNAL MEDICINE

## 2020-12-01 PROCEDURE — 93005 ELECTROCARDIOGRAM TRACING: CPT | Mod: 59

## 2020-12-01 PROCEDURE — G0378 HOSPITAL OBSERVATION PER HR: HCPCS

## 2020-12-01 PROCEDURE — 84484 ASSAY OF TROPONIN QUANT: CPT | Performed by: HOSPITALIST

## 2020-12-01 PROCEDURE — 99236 HOSP IP/OBS SAME DATE HI 85: CPT | Performed by: HOSPITALIST

## 2020-12-01 PROCEDURE — 84484 ASSAY OF TROPONIN QUANT: CPT | Performed by: EMERGENCY MEDICINE

## 2020-12-01 PROCEDURE — 93458 L HRT ARTERY/VENTRICLE ANGIO: CPT | Performed by: INTERNAL MEDICINE

## 2020-12-01 PROCEDURE — C1769 GUIDE WIRE: HCPCS | Performed by: INTERNAL MEDICINE

## 2020-12-01 PROCEDURE — C1894 INTRO/SHEATH, NON-LASER: HCPCS | Performed by: INTERNAL MEDICINE

## 2020-12-01 PROCEDURE — 99153 MOD SED SAME PHYS/QHP EA: CPT | Performed by: INTERNAL MEDICINE

## 2020-12-01 PROCEDURE — 99152 MOD SED SAME PHYS/QHP 5/>YRS: CPT | Performed by: INTERNAL MEDICINE

## 2020-12-01 PROCEDURE — 80048 BASIC METABOLIC PNL TOTAL CA: CPT | Performed by: EMERGENCY MEDICINE

## 2020-12-01 PROCEDURE — 250N000009 HC RX 250: Performed by: INTERNAL MEDICINE

## 2020-12-01 PROCEDURE — 36415 COLL VENOUS BLD VENIPUNCTURE: CPT | Performed by: HOSPITALIST

## 2020-12-01 PROCEDURE — 85027 COMPLETE CBC AUTOMATED: CPT | Performed by: INTERNAL MEDICINE

## 2020-12-01 PROCEDURE — 999N000099 HC STATISTIC MODERATE SEDATION < 10 MIN: Performed by: INTERNAL MEDICINE

## 2020-12-01 PROCEDURE — 71046 X-RAY EXAM CHEST 2 VIEWS: CPT

## 2020-12-01 PROCEDURE — 99285 EMERGENCY DEPT VISIT HI MDM: CPT | Mod: 25

## 2020-12-01 PROCEDURE — 93454 CORONARY ARTERY ANGIO S&I: CPT | Performed by: INTERNAL MEDICINE

## 2020-12-01 PROCEDURE — 250N000013 HC RX MED GY IP 250 OP 250 PS 637: Performed by: INTERNAL MEDICINE

## 2020-12-01 PROCEDURE — 99207 PR APP CREDIT; MD BILLING SHARED VISIT: CPT | Performed by: INTERNAL MEDICINE

## 2020-12-01 PROCEDURE — 272N000001 HC OR GENERAL SUPPLY STERILE: Performed by: INTERNAL MEDICINE

## 2020-12-01 PROCEDURE — 85025 COMPLETE CBC W/AUTO DIFF WBC: CPT | Performed by: EMERGENCY MEDICINE

## 2020-12-01 PROCEDURE — 99215 OFFICE O/P EST HI 40 MIN: CPT | Performed by: INTERNAL MEDICINE

## 2020-12-01 PROCEDURE — 258N000003 HC RX IP 258 OP 636: Performed by: INTERNAL MEDICINE

## 2020-12-01 RX ORDER — NALOXONE HYDROCHLORIDE 0.4 MG/ML
0.2 INJECTION, SOLUTION INTRAMUSCULAR; INTRAVENOUS; SUBCUTANEOUS
Status: DISCONTINUED | OUTPATIENT
Start: 2020-12-01 | End: 2020-12-01 | Stop reason: HOSPADM

## 2020-12-01 RX ORDER — LIDOCAINE 40 MG/G
CREAM TOPICAL
Status: DISCONTINUED | OUTPATIENT
Start: 2020-12-01 | End: 2020-12-01 | Stop reason: HOSPADM

## 2020-12-01 RX ORDER — ACETAMINOPHEN 325 MG/1
650 TABLET ORAL EVERY 4 HOURS PRN
Status: DISCONTINUED | OUTPATIENT
Start: 2020-12-01 | End: 2020-12-01

## 2020-12-01 RX ORDER — SODIUM CHLORIDE 9 MG/ML
INJECTION, SOLUTION INTRAVENOUS CONTINUOUS
Status: DISCONTINUED | OUTPATIENT
Start: 2020-12-01 | End: 2020-12-01

## 2020-12-01 RX ORDER — LIDOCAINE 40 MG/G
CREAM TOPICAL
Status: CANCELLED | OUTPATIENT
Start: 2020-12-01

## 2020-12-01 RX ORDER — EPINEPHRINE 0.3 MG/.3ML
0.3 INJECTION SUBCUTANEOUS PRN
COMMUNITY
End: 2021-07-07

## 2020-12-01 RX ORDER — BISACODYL 10 MG
10 SUPPOSITORY, RECTAL RECTAL DAILY PRN
Status: DISCONTINUED | OUTPATIENT
Start: 2020-12-01 | End: 2020-12-01 | Stop reason: HOSPADM

## 2020-12-01 RX ORDER — ONDANSETRON 4 MG/1
4 TABLET, ORALLY DISINTEGRATING ORAL EVERY 6 HOURS PRN
Status: DISCONTINUED | OUTPATIENT
Start: 2020-12-01 | End: 2020-12-01 | Stop reason: HOSPADM

## 2020-12-01 RX ORDER — HEPARIN SODIUM 10000 [USP'U]/100ML
0-5000 INJECTION, SOLUTION INTRAVENOUS CONTINUOUS
Status: DISCONTINUED | OUTPATIENT
Start: 2020-12-01 | End: 2020-12-01

## 2020-12-01 RX ORDER — ATROPINE SULFATE 0.1 MG/ML
0.5 INJECTION INTRAVENOUS
Status: DISCONTINUED | OUTPATIENT
Start: 2020-12-01 | End: 2020-12-01 | Stop reason: HOSPADM

## 2020-12-01 RX ORDER — AMOXICILLIN 250 MG
1 CAPSULE ORAL 2 TIMES DAILY PRN
Status: DISCONTINUED | OUTPATIENT
Start: 2020-12-01 | End: 2020-12-01 | Stop reason: HOSPADM

## 2020-12-01 RX ORDER — POTASSIUM CHLORIDE 1500 MG/1
20 TABLET, EXTENDED RELEASE ORAL
Status: CANCELLED | OUTPATIENT
Start: 2020-12-01

## 2020-12-01 RX ORDER — FLUMAZENIL 0.1 MG/ML
0.2 INJECTION, SOLUTION INTRAVENOUS
Status: DISCONTINUED | OUTPATIENT
Start: 2020-12-01 | End: 2020-12-01 | Stop reason: HOSPADM

## 2020-12-01 RX ORDER — LORAZEPAM 2 MG/ML
0.5 INJECTION INTRAMUSCULAR
Status: DISCONTINUED | OUTPATIENT
Start: 2020-12-01 | End: 2020-12-01 | Stop reason: HOSPADM

## 2020-12-01 RX ORDER — IOPAMIDOL 755 MG/ML
INJECTION, SOLUTION INTRAVASCULAR
Status: DISCONTINUED | OUTPATIENT
Start: 2020-12-01 | End: 2020-12-01 | Stop reason: HOSPADM

## 2020-12-01 RX ORDER — NITROGLYCERIN 5 MG/ML
VIAL (ML) INTRAVENOUS
Status: DISCONTINUED | OUTPATIENT
Start: 2020-12-01 | End: 2020-12-01 | Stop reason: HOSPADM

## 2020-12-01 RX ORDER — POTASSIUM CHLORIDE 1500 MG/1
20 TABLET, EXTENDED RELEASE ORAL
Status: DISCONTINUED | OUTPATIENT
Start: 2020-12-01 | End: 2020-12-01 | Stop reason: HOSPADM

## 2020-12-01 RX ORDER — NALOXONE HYDROCHLORIDE 0.4 MG/ML
0.4 INJECTION, SOLUTION INTRAMUSCULAR; INTRAVENOUS; SUBCUTANEOUS
Status: DISCONTINUED | OUTPATIENT
Start: 2020-12-01 | End: 2020-12-01 | Stop reason: HOSPADM

## 2020-12-01 RX ORDER — FENTANYL CITRATE 50 UG/ML
25-50 INJECTION, SOLUTION INTRAMUSCULAR; INTRAVENOUS
Status: ACTIVE | OUTPATIENT
Start: 2020-12-01 | End: 2020-12-01

## 2020-12-01 RX ORDER — LORAZEPAM 0.5 MG/1
0.5 TABLET ORAL
Status: DISCONTINUED | OUTPATIENT
Start: 2020-12-01 | End: 2020-12-01 | Stop reason: HOSPADM

## 2020-12-01 RX ORDER — ONDANSETRON 2 MG/ML
4 INJECTION INTRAMUSCULAR; INTRAVENOUS EVERY 6 HOURS PRN
Status: DISCONTINUED | OUTPATIENT
Start: 2020-12-01 | End: 2020-12-01 | Stop reason: HOSPADM

## 2020-12-01 RX ORDER — SODIUM CHLORIDE 9 MG/ML
INJECTION, SOLUTION INTRAVENOUS CONTINUOUS
Status: DISCONTINUED | OUTPATIENT
Start: 2020-12-01 | End: 2020-12-01 | Stop reason: HOSPADM

## 2020-12-01 RX ORDER — VERAPAMIL HYDROCHLORIDE 2.5 MG/ML
INJECTION, SOLUTION INTRAVENOUS
Status: DISCONTINUED | OUTPATIENT
Start: 2020-12-01 | End: 2020-12-01 | Stop reason: HOSPADM

## 2020-12-01 RX ORDER — ACETAMINOPHEN 650 MG/1
650 SUPPOSITORY RECTAL EVERY 4 HOURS PRN
Status: DISCONTINUED | OUTPATIENT
Start: 2020-12-01 | End: 2020-12-01 | Stop reason: HOSPADM

## 2020-12-01 RX ORDER — AMOXICILLIN 250 MG
2 CAPSULE ORAL 2 TIMES DAILY PRN
Status: DISCONTINUED | OUTPATIENT
Start: 2020-12-01 | End: 2020-12-01 | Stop reason: HOSPADM

## 2020-12-01 RX ORDER — HEPARIN SODIUM 1000 [USP'U]/ML
INJECTION, SOLUTION INTRAVENOUS; SUBCUTANEOUS
Status: DISCONTINUED | OUTPATIENT
Start: 2020-12-01 | End: 2020-12-01 | Stop reason: HOSPADM

## 2020-12-01 RX ORDER — LIDOCAINE 40 MG/G
CREAM TOPICAL
Status: DISCONTINUED | OUTPATIENT
Start: 2020-12-01 | End: 2020-12-01

## 2020-12-01 RX ORDER — SODIUM CHLORIDE 9 MG/ML
INJECTION, SOLUTION INTRAVENOUS CONTINUOUS
Status: CANCELLED | OUTPATIENT
Start: 2020-12-01

## 2020-12-01 RX ORDER — POLYETHYLENE GLYCOL 3350 17 G/17G
17 POWDER, FOR SOLUTION ORAL DAILY PRN
Status: DISCONTINUED | OUTPATIENT
Start: 2020-12-01 | End: 2020-12-01 | Stop reason: HOSPADM

## 2020-12-01 RX ORDER — ACETAMINOPHEN 325 MG/1
650 TABLET ORAL EVERY 4 HOURS PRN
Status: DISCONTINUED | OUTPATIENT
Start: 2020-12-01 | End: 2020-12-01 | Stop reason: HOSPADM

## 2020-12-01 RX ORDER — POTASSIUM CHLORIDE 1500 MG/1
20 TABLET, EXTENDED RELEASE ORAL
Status: DISCONTINUED | OUTPATIENT
Start: 2020-12-01 | End: 2020-12-01

## 2020-12-01 RX ORDER — NITROGLYCERIN 0.4 MG/1
0.4 TABLET SUBLINGUAL EVERY 5 MIN PRN
Status: DISCONTINUED | OUTPATIENT
Start: 2020-12-01 | End: 2020-12-01 | Stop reason: HOSPADM

## 2020-12-01 RX ORDER — FENTANYL CITRATE 50 UG/ML
INJECTION, SOLUTION INTRAMUSCULAR; INTRAVENOUS
Status: DISCONTINUED | OUTPATIENT
Start: 2020-12-01 | End: 2020-12-01 | Stop reason: HOSPADM

## 2020-12-01 RX ADMIN — ASPIRIN 325 MG: 325 TABLET, COATED ORAL at 10:08

## 2020-12-01 RX ADMIN — SODIUM CHLORIDE: 9 INJECTION, SOLUTION INTRAVENOUS at 10:07

## 2020-12-01 ASSESSMENT — ENCOUNTER SYMPTOMS
HEADACHES: 1
SHORTNESS OF BREATH: 1
NAUSEA: 1

## 2020-12-01 ASSESSMENT — MIFFLIN-ST. JEOR: SCORE: 2388.17

## 2020-12-01 NOTE — PROGRESS NOTES
Called patient and left VM advising patient to call back to review Pre cath instructions:     Contrast allergy: no  Anticoagulation: yes, held since 11/29/20   Metformin: no  Oral DM meds: no  Insulin: no  Diuretic: no  Use of phosphodiesterase type 5 inhibitor: no  Aspirin: no, will take 325mg   Pt informed to be NPO at midnight  Renal issues no  Pt has transportation and 24 hours post procedure monitoring set up.   Pt aware of no driving for 24 hours post procedure.     Pt aware of arrival time and location. Pt verbalized understanding of instructions.       COVID TEST 11/30/20 PENDING

## 2020-12-01 NOTE — ED NOTES
"[facility]  ED Nurse Handoff Report    ED Chief complaint: Chest Pain (with movement, Has angiogram scheduled.), Shortness of Breath, and Headache (with nausea)      ED Diagnosis:   Final diagnoses:   Chest pain, unspecified type       Code Status: Full Code    Allergies:   Allergies   Allergen Reactions     Bee Pollen Anaphylaxis     Other reaction(s): Unknown Reaction     Cefazolin Hives     Other reaction(s): Hives     Azithromycin Nausea and Vomiting     Latex Hives     Morphine Hives     Penicillins Nausea and Vomiting     Bupropion Other (See Comments)     Made him feel suicidal  Made him feel suicidal         Patient Story: Pt hx of PE, and dx with ascending aortic aneurysm. On Xarelto. Seen by his cardiologist on 11/30 and scheduled for a left heart catheterization.  Tested for COVID 11/30, Pending.  Focused Assessment:  Today he presents with chest pain and shortness of breath giorgio with activity.    Treatments and/or interventions provided:   Patient's response to treatments and/or interventions:     To be done/followed up on inpatient unit:      Does this patient have any cognitive concerns?: none    Activity level - Baseline/Home:  Independent  Activity Level - Current:   Independent    Patient's Preferred language: English   Needed?: No    Isolation: COVID r/o and special precautions Tested for COVID 11/30, Pending.  Infection: COVID r/o and special precautions  Patient tested for COVID 19 prior to admission: YES  Bariatric?: No    Vital Signs:   Vitals:    12/01/20 0034 12/01/20 0100   BP: 116/70    Pulse: 56 (!) 49   Resp: 18 16   Temp: 97.8  F (36.6  C)    TempSrc: Oral    SpO2: 96% 96%   Weight: 136.1 kg (300 lb)    Height: 1.956 m (6' 5\")        Cardiac Rhythm:     Was the PSS-3 completed:   Yes  What interventions are required if any?               Family Comments:       For the majority of the shift this patient's behavior was Green.   Behavioral interventions performed " were .    ED NURSE PHONE NUMBER: [phone]

## 2020-12-01 NOTE — PRE-PROCEDURE
GENERAL PRE-PROCEDURE:   Procedure:  Cor angio possible PCI  Date/Time:  12/1/2020 10:28 AM    Written consent obtained?: Yes    Risks and benefits: Risks, benefits and alternatives were discussed    Consent given by:  Patient  Patient states understanding of procedure being performed: Yes    Patient's understanding of procedure matches consent: Yes    Procedure consent matches procedure scheduled: Yes    Appropriately NPO:  Yes  ASA Class:  Class 4- Severe systemic disease, acute unstable problems  History & Physical reviewed:  History and physical reviewed and no updates needed  Statement of review:  I have reviewed the lab findings, diagnostic data, medications, and the plan for sedation

## 2020-12-01 NOTE — CONSULTS
Event note      Coronary angiogram without any obstructive CAD. Patient can likely discharge later today.    1. Can resume rivaroxaban tomorrow   2. Recommend aspirin 81 mg daily   3. Continue PTA metoprolol and imdur   4. Can follow up with me in clinic (virtual visit)     Moni Barreto MD MSc  [facility]

## 2020-12-01 NOTE — PHARMACY-ADMISSION MEDICATION HISTORY
Pharmacy Medication History  Admission medication history interview status for the 12/1/2020  admission is complete. See EPIC admission navigator for prior to admission medications       Medication history sources: Patient, Surescripts and Care Everywhere  Location of interview: Phone  Adherence Assessment: Good    Significant changes made to the medication list:  - Removed albuterol and buspirone  - Added Epipen    Additional medication history information:   - Atorvastatin and isosorbide mononitrate started 11/30/2020.  - Last dose of Xarelto was 11/29/2020 - held for procedure.    Medication reconciliation completed by provider prior to medication history? No    Time spent in this activity: 20 minutes      Prior to Admission medications    Medication Sig Last Dose Taking? Auth Provider   atorvastatin (LIPITOR) 40 MG tablet Take 1 tablet (40 mg) by mouth daily 11/30/2020 at First dose Yes More, Quynh Arteaga PA-C   EPINEPHrine (ANY BX GENERIC EQUIV) 0.3 MG/0.3ML injection 2-pack Inject 0.3 mg into the muscle as needed for anaphylaxis prn Yes Unknown, Entered By History   isosorbide mononitrate (IMDUR) 30 MG 24 hr tablet Take 1 tablet (30 mg) by mouth daily 11/30/2020 at First dose Yes More, Quynh Arteaga PA-C   metoprolol succinate ER (TOPROL-XL) 50 MG 24 hr tablet Take 50 mg by mouth daily 11/29/2020 Yes Reported, Patient   rivaroxaban ANTICOAGULANT (XARELTO) 20 MG TABS tablet Take 1 tablet (20 mg) by mouth daily (with dinner) 11/29/2020 at HELD FOR SURGERY Yes Moni Barreto MD       The information provided in this note is only as accurate as the sources available at the time of the update(s).

## 2020-12-01 NOTE — H&P
[facility]    History and Physical  Hospitalist       Date of Admission:  12/1/2020  Date of Service (when I saw the patient): 12/01/20    Assessment & Plan   Jace Pratt is a 40 year old male who presents with chest pain. Admitted for further evaluation and treatment.     Chest pain, hyperlipidemia: Patient presenting to the emergency department with chest pain.  Patient recently seen by cardiology and reportedly scheduled for heart catheterization on December 1.  Patient complains of nausea and headache shortness of breath.  Patient previously negative Covid testing on November 7.  Patient denies abdominal pain, rash, fever, headache, ear pain, eye pain, cough, sore throat, dysuria, blood in stool or urine, diarrhea, constipation.  Previous nuclear medicine treadmill cardiac stress testing with report of nuclear test is probably abnormal, see report for further details.  Last echocardiogram completed in November 2020 with report of ascending aorta with mild dilation.  -Cards consult.   -Serial troponin.   -Close hemodynamic monitoring.   -Continue prior to admission atorvastatin when verified by pharmacy.  -Continue prior to admission isosorbide when verified by pharmacy.  -Continue prior to admission metoprolol when verified by pharmacy.  -Nitroglycerin as needed  -EKG as needed    History of pulmonary embolism: Chronically anticoagulated.  -Hold prior to admission Xarelto at this time pending cardiac catheterization.    Anxiety, depression: Stable.  -Continue prior to admission BuSpar when verified by pharmacy.    DVT Prophylaxis: Pneumatic Compression Devices  Code Status: Full Code    Disposition: Observation.    Dr. Octavio Shankar D.O.  [facility] Hospitalist  Pager [phone]    Primary Care Physician   Luz Rousseau    Chief Complaint   Chest pain    History is obtained from the patient and medical records.     History of Present Illness   Jace Pratt is a 40  year old male who presents with chest pain. Admitted for further evaluation and treatment. Patient presenting to the emergency department with chest pain.  Patient recently seen by cardiology for ascending aortic aneurysm and reportedly scheduled for heart catheterization on December 1.  Patient complains of nausea and headache shortness of breath.  Patient previously negative Covid testing on November 7.  Patient denies abdominal pain, rash, fever, headache, ear pain, eye pain, cough, sore throat, dysuria, blood in stool or urine, diarrhea, constipation.  Previous nuclear medicine treadmill cardiac stress testing with report of nuclear test is probably abnormal, see report for further details.    Past Medical History    I have reviewed this patient's medical history and updated it with pertinent information if needed.   Past Medical History:   Diagnosis Date     Acute saddle pulmonary embolism with acute cor pulmonale (H)      Anxiety      Ascending aortic aneurysm (H)      Depression      Former smoker      Neuromuscular disorder (H)      Stable angina (H)      Varicose veins of right lower extremity with edema        Past Surgical History   I have reviewed this patient's surgical history and updated it with pertinent information if needed.  No past surgical history on file.    Prior to Admission Medications   Prior to Admission Medications   Prescriptions Last Dose Informant Patient Reported? Taking?   albuterol (PROAIR HFA/PROVENTIL HFA/VENTOLIN HFA) 108 (90 Base) MCG/ACT inhaler   No No   Sig: Inhale 2 puffs into the lungs every 6 hours as needed for shortness of breath / dyspnea or wheezing   Patient not taking: Reported on 11/6/2020   atorvastatin (LIPITOR) 40 MG tablet   No No   Sig: Take 1 tablet (40 mg) by mouth daily   busPIRone (BUSPAR) 15 MG tablet   Yes No   isosorbide mononitrate (IMDUR) 30 MG 24 hr tablet   No No   Sig: Take 1 tablet (30 mg) by mouth daily   metoprolol succinate ER (TOPROL-XL) 50 MG  24 hr tablet   Yes No   Sig: Take 50 mg by mouth daily   rivaroxaban ANTICOAGULANT (XARELTO) 20 MG TABS tablet   No No   Sig: Take 1 tablet (20 mg) by mouth daily (with dinner)      Facility-Administered Medications: None     Allergies   Allergies   Allergen Reactions     Bee Pollen Anaphylaxis     Other reaction(s): Unknown Reaction     Cefazolin Hives     Other reaction(s): Hives     Azithromycin Nausea and Vomiting     Latex Hives     Morphine Hives     Penicillins Nausea and Vomiting     Bupropion Other (See Comments)     Made him feel suicidal  Made him feel suicidal         Social History   I have reviewed this patient's social history and updated it with pertinent information if needed. Jace Pratt  reports that he has quit smoking. His smoking use included cigarettes. He quit smokeless tobacco use about 23 months ago. He reports current alcohol use. He reports that he does not use drugs.    Family History   I have reviewed this patient's family history and updated it with pertinent information if needed.   Family History   Problem Relation Age of Onset     Clotting Disorder Maternal Grandmother        Review of Systems   The 10 point Review of Systems is negative other than noted in the HPI or here.     Physical Exam   Temp: 97.8  F (36.6  C) Temp src: Oral BP: 116/70 Pulse: (!) 49   Resp: 16 SpO2: 96 % O2 Device: None (Room air)    Vital Signs with Ranges  Temp:  [97.8  F (36.6  C)] 97.8  F (36.6  C)  Pulse:  [49-60] 49  Resp:  [16-18] 16  BP: (116-131)/(70-85) 116/70  SpO2:  [96 %] 96 %  300 lbs 0 oz    GENERAL: Alert and oriented. NAD. Conversational, appropriate.   HEENT: Normocephalic. EOMI. No icterus or injection. Nares normal.   LUNGS: Clear to auscultation. No dyspnea at rest.   HEART: Regular rate. Extremities perfused.   ABDOMEN: Soft, nontender, and nondistended. Positive bowel sounds.   EXTREMITIES: No LE edema noted.   NEUROLOGIC: Moves extremities x4 on command. No acute focal neurologic  abnormalities noted.     Data   Data reviewed today:  I personally reviewed both laboratory and imaging data.   Recent Labs   Lab 12/01/20  0044   WBC 7.4   HGB 16.4   MCV 87         POTASSIUM 4.0   CHLORIDE 106   CO2 27   BUN 16   CR 1.17   ANIONGAP 6   JANIE 8.8   *   TROPI <0.015       Recent Results (from the past 24 hour(s))   XR Chest 2 Views    Narrative    EXAM: XR CHEST 2 VW  LOCATION: [facility]  DATE/TIME: 12/1/2020 12:56 AM    INDICATION: Chest pain  COMPARISON: 09/14/2020      Impression    IMPRESSION: Negative chest.

## 2020-12-01 NOTE — PLAN OF CARE
A/Ox4. BP slightly elevated HR 40's. Denies pain. Lung sounds are clear. Skin intact. Right radial site CDI, with CMS intact. Barbeau negative. Tele SB. Metoprolol held. Discharge instructions given to patient and all questions and concerns addressed. Up dated brother Hernando. Patient left with nursing staff

## 2020-12-01 NOTE — DISCHARGE INSTRUCTIONS
Can go back to work in three days on 12/5.       Please reach out to one of the following clinics to establish care with a new primary care provider.     [facility]    [address] [address]   Contact  Appointments: [phone]   Clinic: [phone]   Fax: [phone]   Mickey Aldridge, OCRA, Dr. Mendoza    [facility]    [address] [place] [address]   Contact  Clinic: [phone]   Fax: [phone]   Dr. Montemayor, Dr. Mcintosh, Dr. Darling    [facility]    [address] [address]   Contact  Clinic: [phone]   Fax: [phone]     Dr. Rowe, Dr. Warren, Dr. Torres          Cardiac Angiogram Discharge Instructions - Radial    After you go home:      Have an adult stay with you until tomorrow.    Drink extra fluids for 2 days.    You may resume your normal diet.    No smoking       For 24 hours - due to the sedation you received:    Relax and take it easy.    Do NOT make any important or legal decisions.    Do NOT drive or operate machines at home or at work.    Do NOT drink alcohol.    Care of Wrist Puncture Site:      For the first 24 hrs - check the puncture site every 1-2 hours while awake.    It is normal to have soreness at the puncture site and mild tingling in your hand for up to 3 days.    Remove the bandaid after 24 hours. If there is minor oozing, apply another bandaid and remove it after 12 hours.    You may shower tomorrow.  Do NOT take a bath, or use a hot tub or pool for at least 3 days. Do NOT scrub the site. Do not use lotion or powder near the puncture site.           Activity:        For 2 days:     do not use your hand or arm to support your weight (such as rising from a chair)     do not bend your wrist (such as lifting a garage door).    do not lift more than 5 pounds or exercise your arm (such as tennis, golf or bowling).    Do NOT  do any heavy activity such as exercise, lifting, or straining.     Bleeding:      If you start bleeding from the site in your wrist, sit down and press firmly on/above the site for 10 minutes.     Once bleeding stops, keep arm still for 2 hours.     Call [facility] as soon as you can.       Call 911 right away if you have heavy bleeding or bleeding that does not stop.      Medicines:      If you are taking an antiplatelet medication such as Plavix, Brilinta or Effient, do not stop taking it until you talk to your cardiologist.        If you are on Metformin (Glucophage), do not restart it until you have blood tests (within 2 to 3 days after discharge).  After you have your blood drawn, you may restart the Metformin.     Take your medications, including blood thinners, unless your provider tells you not to.      If you take Coumadin (Warfarin), have your INR checked by your provider in  3-5 days. Call your clinic to schedule this.    If you have stopped any medicines, check with your provider about when to restart them.    Follow Up Appointments:      Follow up with [facility] Nurse Practitioner at [facility] of patient preference in 7-10 days.    Call the clinic if:      You have a large or growing hard lump around the site.    The site is red, swollen, hot or tender.    Blood or fluid is draining from the site.    You have chills or a fever greater than 101 F (38 C).    Your arm feels numb, cool or changes color.    You have hives, a rash or unusual itching.    Any questions or concerns.          [facility] [place]:    [phone] [facility] (7 days a week)

## 2020-12-01 NOTE — ED PROVIDER NOTES
History     Chief Complaint:  Chest Pain, Shortness of Breath, and Headache (with nausea)      The history is provided by the patient.      Jace Pratt is a 40 year old male on Xarelto with a history of saddle PE and ascending aortic aneurysm who presents with ongoing chest pain. Of note, the patient recently saw cardiology on 11/30 for an ascending aortic aneurysm and is scheduled for a left heart catheterization on 12/2. Patient states that his chest pain is primarily present with movement. Here, the patient additionally complains of nausea and headache as well as shortness of breath. The patient had a negative COVID test on 11/7 and was retested on 11/30 which is currently pending.    Allergies:  Cefazolin  Azithromycin  Morphine  Erythromycin  Oxycodone  Hydrocodone  Penicillins  Bupropion  Latex    Medications:    Albuterol inhaler  Atorvastatin  Buspirone  Imdur  Metoprolol succinate  Xarelto    Past Medical History:    Stable angina pectoris  Pulmonary embolism  Major depression   Ascending aortic aneurysm  Anxiety  Muscular dystrophy    Past Surgical History:    Bilateral hernia repair  Appendectomy  Cholecystectomy  Right foot surgery  Right wrist surgery  Left tricep tendon repair  Colonoscopy  Right foot endoscopic plantar fasciotomy    Family History:    Father - alcoholism  Mother - vertigo    Social History:  The patient was not accompanied to the ED.  Smoking Status: Former smoker - quit 2019  Smokeless Tobacco: Former user  Alcohol Use: Yes  Drug Use: No  Marital Status:      Review of Systems   Respiratory: Positive for shortness of breath.    Cardiovascular: Positive for chest pain.   Gastrointestinal: Positive for nausea.   Neurological: Positive for headaches.   All other systems reviewed and are negative.    Physical Exam     Patient Vitals for the past 24 hrs:   BP Temp Temp src Pulse Resp SpO2 Height Weight   12/01/20 0100 -- -- -- (!) 49 16 96 % -- --   12/01/20 0034 116/70 97.8  " F (36.6  C) Oral 56 18 96 % 1.956 m (6' 5\") 136.1 kg (300 lb)       Physical Exam  General: Appears well-developed and well-nourished.   Head: No signs of trauma.   CV: Normal rate and regular rhythm.    Resp: Effort normal and breath sounds normal. No respiratory distress.   GI: Soft. There is no tenderness.  No rebound or guarding.  Normal bowel sounds.    MSK: Normal range of motion. no edema. No Calf tenderness.  Neuro: The patient is alert and oriented. Speech normal.  Skin: Skin is warm and dry. No rash noted.   Psych: normal mood and affect. behavior is normal.       Emergency Department Course     ECG:  Indication: Chest pain, shortness of breath   Completed at 0031.  Read at 0037.   Sinus bradycardia   Rate 54 bpm. ND interval 156. QRS duration 92. QT/QTc 416/394. P-R-T axes 37 16 36.  Agree with computer interpretation.     Imaging:  Radiology findings were communicated with the patient who voiced understanding of the findings.    XR Chest 2 Views:  Negative chest.  Report per radiology.    Laboratory:  Laboratory findings were communicated with the patient who voiced understanding of the findings.    CBC: WBC 7.4, HGB 16.4,   BMP: Glucose 110 (H), o/w WNL (Creatinine 1.17)    (0044) Troponin I ES: <0.015     Procedures  None.    Emergency Department Course:  Past medical records, nursing notes, and vitals reviewed.    0044 I performed an exam of the patient as documented above.     EKG obtained in the ED, see results above.     IV was inserted and blood was drawn for laboratory testing, results above.    The patient was sent for a chest X-ray while in the emergency department, results above.     0124 I spoke with Dr. Shankar of the hospitalist service regarding patient's presentation, findings, and plan of care.    Findings and plan explained to the Patient who consents to admission. Discussed the patient with Dr. Shankar, who will admit the patient to an observation bed for further monitoring, " evaluation, and treatment.    I personally reviewed the laboratory and imaging results with the Patient and answered all related questions prior to admission.     Impression & Plan       Covid-19  Jace Pratt was evaluated during a global COVID-19 pandemic, which necessitated consideration that the patient might be at risk for infection with the SARS-CoV-2 virus that causes COVID-19.   Applicable protocols for evaluation were followed during the patient's care.   COVID-19 was considered as part of the patient's evaluation. A test was obtained at a previous visit and reviewed & considered today.    Medical Decision Making:  Jace Pratt is a 40 year old male who presents due to chest pain. Per the patient and on chart review, he apparently has been dealing with chest pain recently and actually recently had a stress test that showed signs of ischemia. He had been seen by cardiology on 11/30 with the plan to do a heart cath, but it was decided not to admit him at that time given the limited beds secondary to COVID. The patient had further symptoms this evening and came to the ER. EKG did not show any concerning ST segment changes and his troponin was negative. Given his recent diagnostic work-up and symptoms this evening, it was decided to admit the patient for cardiology consultation in the morning.    Diagnosis:    ICD-10-CM    1. Chest pain, unspecified type  R07.9        Disposition:  Admitted to Dr. Shankar for observation.      Scribe Disclosure:  I, Charlene Sosa, am serving as a scribe at 12:44 AM on 12/1/2020 to document services personally performed by Gideon Hamilton MD based on my observations and the provider's statements to me.     Charlene Sosa  12/1/2020   [facility] EMERGENCY DEPT       Gideon Hamilton MD  12/01/20 0414

## 2020-12-01 NOTE — PLAN OF CARE
Neuro- A&Ox4  Most Recent Vitals- Temp: 97.9  F (36.6  C) Temp src: Oral BP: 124/67 Pulse: (!) 47   Resp: 16 SpO2: 98 % O2 Device: None (Room air)    Tele/Cardiac- SB  Resp- 18  Activity- SBA  Pain- came in with CP, denies  Drips- none  Drains/Tubes- PIV  Skin- WNL  GI/- voiding adequately per patient  Aggression Color- Green  COVID status- Pending  Plan- Cards consult, possible angio today  Misc- + lissa scan, angio scheduled OP 12/2    Sanjuana Diaz RN

## 2020-12-01 NOTE — CONSULTS
[facility]    Cardiology Consultation     Date of Admission:  12/1/2020    Assessment & Plan     This is a 40 year old patient of mine with h/o PE on chronic anticoagulation with rivaroxban, family history of CAD here with accelerated anginal symptoms. Although some atypical features he had a positive coronary calcium CT as well as nontransmural infarct on recent nuclear stress in distribution of RCA. Calcium CT showed RCA lesion and mild LAD lesion. Plan was for outpatient angiography however he came in over night with worsening resting chest pain concerning for unstable angina. TN negative x 2 and EKG without ischemic changes.    Plan:  1. Coronary angiogram: risks discussed including stroke, MI, death, LATESHA and need for transfusion. Patient understands risks and benefits and wishes to proceed.  2. Aspirin loaded --> aspirin 81 mg daily   3. Heparin infusion  4. Monitor on telemetry  5. Last dose of rivaroxaban was 11/29, normal kidney function. Pre procedural hold of >24 hours adequate to proceed with angiogram. Resume post cath.   6. Continue PTA imdur and betablocker     Moni Barreto MD    Code Status    Full Code    Reason for Consult     Chest pain     Primary Care Physician   *Luz Rousseau    Chief Complaint     Chest pain     History of Present Illness     This is a 40 year old male clinic patient of [facility] here for chest pain. Has history of cad and PE on chronic anticoagulation. I have been seeing him as outpatient for past few months with increased chest pain. He is a  and began having left sided substernal pain on exertion and eventually developed resting chest pain. Underwent exercise treadmill test last year at OSH which was negative. Echocardiogram on 11/2020 demonstrated mildy RV enlargement and normal LVEF without WMA, mildly dilated aortic root. He had a nuclear stress test demonstrating infarct of the inferior and inferolateral segments with mildly reduced  LV function. His calcium score by CT showed LAD score of 30 and RCA of 122, placing him in 99th percentile for age and sex. He reports chest discomfort awakening him up from sleep this morning prompting ER admission. Mild SOB as well. No dizziness, syncope, LE edema. No COVID exposures. No fevers, chills, ns.      Past Medical History   I have reviewed this patient's medical history and updated it with pertinent information if needed.   Past Medical History:   Diagnosis Date     Acute saddle pulmonary embolism with acute cor pulmonale (H)      Anxiety      Ascending aortic aneurysm (H)      Depression      Former smoker      Neuromuscular disorder (H)      Stable angina (H)      Varicose veins of right lower extremity with edema        Past Surgical History   I have reviewed this patient's surgical history and updated it with pertinent information if needed.  No past surgical history on file.    Prior to Admission Medications   Prior to Admission Medications   Prescriptions Last Dose Informant Patient Reported? Taking?   EPINEPHrine (ANY BX GENERIC EQUIV) 0.3 MG/0.3ML injection 2-pack prn Self Yes Yes   Sig: Inject 0.3 mg into the muscle as needed for anaphylaxis   atorvastatin (LIPITOR) 40 MG tablet 11/30/2020 at First dose Self No Yes   Sig: Take 1 tablet (40 mg) by mouth daily   isosorbide mononitrate (IMDUR) 30 MG 24 hr tablet 11/30/2020 at First dose Self No Yes   Sig: Take 1 tablet (30 mg) by mouth daily   metoprolol succinate ER (TOPROL-XL) 50 MG 24 hr tablet 11/29/2020 Self Yes Yes   Sig: Take 50 mg by mouth daily   rivaroxaban ANTICOAGULANT (XARELTO) 20 MG TABS tablet 11/29/2020 at HELD FOR SURGERY Self No Yes   Sig: Take 1 tablet (20 mg) by mouth daily (with dinner)      Facility-Administered Medications: None     Allergies   Allergies   Allergen Reactions     Bee Pollen Anaphylaxis     Other reaction(s): Unknown Reaction     Cefazolin Hives     Other reaction(s): Hives     Azithromycin Nausea and Vomiting      Latex Hives     Morphine Hives     Penicillins Nausea and Vomiting     Bupropion Other (See Comments)     Made him feel suicidal  Made him feel suicidal         Social History   I have reviewed this patient's social history and updated it with pertinent information if needed. Jace Pratt  reports that he has quit smoking. His smoking use included cigarettes. He quit smokeless tobacco use about 23 months ago. He reports current alcohol use. He reports that he does not use drugs.    Family History   I have reviewed this patient's family history and updated it with pertinent information if needed.   Family History   Problem Relation Age of Onset     Clotting Disorder Maternal Grandmother        Review of Systems   The 10 point Review of Systems is negative other than noted in the HPI or here.     Physical Exam   Temp: 97.1  F (36.2  C) Temp src: Oral BP: (!) 141/63 Pulse: (!) 42   Resp: 16 SpO2: 98 % O2 Device: None (Room air)    Vital Signs with Ranges  Temp:  [97.1  F (36.2  C)-97.9  F (36.6  C)] 97.1  F (36.2  C)  Pulse:  [42-60] 42  Resp:  [16-18] 16  BP: (100-141)/(61-85) 141/63  SpO2:  [96 %-98 %] 98 %  300 lbs 0 oz    Constitutional: Awake, alert, cooperative, no apparent distress.  Eyes: Conjunctiva and pupils examined and normal.  HEENT: Moist mucous membranes, normal dentition.  Respiratory: Clear to auscultation bilaterally, no crackles or wheezing.  Cardiovascular: Regular rate and rhythm, normal S1 and S2, and no murmur noted.  GI: Soft, non-distended, non-tender, normal bowel sounds.  Lymph/Hematologic: No anterior cervical or supraclavicular adenopathy.  Skin: No rashes, no cyanosis, no edema.  Musculoskeletal: No joint swelling, erythema or tenderness.  Neurologic: Cranial nerves 2-12 intact, normal strength and sensation.  Psychiatric: Alert, oriented to person, place and time, no obvious anxiety or depression.     Data     CORONARY ARTERY CALCIUM SCORES:      Left main coronary artery: 0  Left  anterior descending coronary artery: 30.1  Circumflex coronary artery: 0   Right coronary artery: 122     TOTAL CALCIUM SCORE: 152     The total Agatston calcium score is 152 placing the patient in the  99th percentile when compared to age and gender matched control group.     Please review Radiology report for incidental noncardiac findings that  will follow separately     ECHOCARDIOGRAM     Technically difficult study. Contrast used with some improvement.  The ascending aorta is mildly dilated at 42 mm. Root size is normal.  The visual ejection fraction is estimated at 60-65%.  Borderline right ventricular enlargement. The right ventricular systolic  function is normal. RV images were suboptimal.  The right atrium is mildly dilated.  Right ventricular systolic pressure could not be approximated due to  inadequate tricuspid regurgitation.  Dilation of the inferior vena cava is present with abnormal respiratory  variation in diameter.  No hemodynamically significant valve disease.    NUCLEAR STRESS TEST       The nuclear stress test is probably abnormal.     There is nontransmural infarction in the inferior and inferolateral segment(s) of the left ventricle.     Left ventricular function is mildly reduced.     The left ventricular ejection fraction at stress is 47%.     There is no prior study for comparison.

## 2020-12-01 NOTE — PROGRESS NOTES
RECEIVING UNIT ED HANDOFF REVIEW    ED Nurse Handoff Report was reviewed by: Sanjuana Diaz RN on December 1, 2020 at 2:57 AM

## 2020-12-01 NOTE — DISCHARGE SUMMARY
[facility]    Discharge Summary  Hospitalist    Date of Admission:  12/1/2020  Date of Discharge:  12/1/2020  Discharging Provider: Ronda Law    Discharge Diagnoses   Chest pain, mild non-occlusive CAD on angiogram  Hx of PE, on chronic anticoagulation  Hyperlipidemia  Anxiety, Depression    History of Present Illness   Jace Pratt is an 40 year old male with PMHx of hyperlipidemia, PE for which he is on anticoagulation with Xarelto, anxiety and depression who was admitted under observation status on 12/1/2020 for evaluation of chest pain.      Hospital Course   Jace Pratt was admitted on 12/1/2020.  The following problems were addressed during his hospitalization:    Had hx of elevated Ca score and abnl treadmill stress test in 11/2020. Was sched for outpatient angiogram on 12/1. Presented to ED for evaluation of chest pain, nausea, headache and shortness of breath. Afebrile and hemodynamically stable. Labs unremarkable. EKG not ischemic. Clinical picture not suggestive of COVID. Serial trops were neg. Seen by cardiology -- underwent angiogram on 12/1 which was neg for obstructive disease. Cardiology recommended starting low dose ASA in addition to continuing Xarelto. HRs in 40s on tele so metoprolol was stopped. Imdur and statin were continued.     Condition improved during short stay. Discharged home in stable condition. Will follow up with PCP and Dr Barreto.     Other conditions remained stable during his stay.     Ronda Law, DO    Significant Results and Procedures   Angiogram on 12/1 showed mild non-occlusive CAD.     Pending Results   These results will be followed up by PCP  Unresulted Labs Ordered in the Past 30 Days of this Admission     Date and Time Order Name Status Description    11/30/2020 1347 SARS-COV-2 (COVID-19) VIRUS RT-PCR In process           Code Status   Full Code       Primary Care Physician   Luz Rousseau    Physical Exam    Temp: 97.1  F (36.2  C) Temp src: Oral BP: 115/69 Pulse: (!) 47   Resp: 16 SpO2: 98 % O2 Device: None (Room air)    Vitals:    12/01/20 0034   Weight: 136.1 kg (300 lb)     Vital Signs with Ranges  Temp:  [97.1  F (36.2  C)-97.9  F (36.6  C)] 97.1  F (36.2  C)  Pulse:  [42-56] 47  Resp:  [16-18] 16  BP: (100-141)/(61-70) 115/69  SpO2:  [96 %-98 %] 98 %  No intake/output data recorded.    General: Resting comfortably, alert, conversive, NAD  CVS: HRRR, on MGR, no LE edema  Respiratory: CTAB, no wheeze/rales/rhonchi, no increased work of breathing  GI: S, NT, ND, +BS  Skin: Warm/dry    Discharge Disposition   Discharged to home  Condition at discharge: Stable    Consultations This Hospital Stay   CARDIOLOGY IP CONSULT  SMOKING CESSATION PROGRAM IP CONSULT    Time Spent on this Encounter   IRonda DO, personally saw the patient today and spent greater than 30 minutes discharging this patient.    Discharge Orders      Reason for your hospital stay    Evaluation of your chest pain, for which you underwent evaluation with a angiogram. You were not found to have any significant blockages that required stent placed.     Follow-up and recommended labs and tests     Follow up with your PCP in the next week.   Follow up with [facility] (Dr. Barreto) in clinic as advised.     Activity    Your activity upon discharge: activity as tolerated     Discharge Instructions    Hold Xarelto on 12/1. Okay to resume on 12/2.     Diet    Follow this diet upon discharge: Regular     Discharge Medications   Current Discharge Medication List      START taking these medications    Details   aspirin (ASA) 81 MG EC tablet Take 1 tablet (81 mg) by mouth daily  Qty: 30 tablet, Refills: 0    Associated Diagnoses: Chest pain, unspecified type; Elevated coronary artery calcium score         CONTINUE these medications which have NOT CHANGED    Details   atorvastatin (LIPITOR) 40 MG tablet Take 1 tablet (40 mg) by mouth  daily  Qty: 90 tablet, Refills: 3    Associated Diagnoses: Coronary artery disease of native artery of native heart with stable angina pectoris (H)      EPINEPHrine (ANY BX GENERIC EQUIV) 0.3 MG/0.3ML injection 2-pack Inject 0.3 mg into the muscle as needed for anaphylaxis      isosorbide mononitrate (IMDUR) 30 MG 24 hr tablet Take 1 tablet (30 mg) by mouth daily  Qty: 30 tablet, Refills: 11    Associated Diagnoses: Stable angina pectoris (H)      rivaroxaban ANTICOAGULANT (XARELTO) 20 MG TABS tablet Take 1 tablet (20 mg) by mouth daily (with dinner)  Qty: 90 tablet, Refills: 0    Associated Diagnoses: Acute saddle pulmonary embolism with acute cor pulmonale (H)         STOP taking these medications       metoprolol succinate ER (TOPROL-XL) 50 MG 24 hr tablet Comments:   Reason for Stopping:             Allergies   Allergies   Allergen Reactions     Bee Pollen Anaphylaxis     Other reaction(s): Unknown Reaction     Cefazolin Hives     Other reaction(s): Hives     Azithromycin Nausea and Vomiting     Latex Hives     Morphine Hives     Penicillins Nausea and Vomiting     Bupropion Other (See Comments)     Made him feel suicidal  Made him feel suicidal       Data   Most Recent 3 CBC's:  Recent Labs   Lab Test 12/01/20  0044 09/15/20  2242 09/14/20  1939   WBC 7.4 10.2 10.7   HGB 16.4 15.9 16.2   MCV 87 89 89    223 242      Most Recent 3 BMP's:  Recent Labs   Lab Test 12/01/20 0044 09/15/20  2242 09/14/20  1939    140 138   POTASSIUM 4.0 4.1 3.8   CHLORIDE 106 108 106   CO2 27 27 29   BUN 16 19 21   CR 1.17 1.06 1.22   ANIONGAP 6 5 3   JANIE 8.8 8.9 9.3   * 128* 131*     Most Recent 3 Troponin's:  Recent Labs   Lab Test 12/01/20  0658 12/01/20  0044 09/15/20  2242   TROPI <0.015 <0.015 <0.015     Results for orders placed or performed during the hospital encounter of 12/01/20   XR Chest 2 Views    Narrative    EXAM: XR CHEST 2 VW  LOCATION: [facility]  DATE/TIME: 12/1/2020 12:56  AM    INDICATION: Chest pain  COMPARISON: 09/14/2020      Impression    IMPRESSION: Negative chest.   Cardiac Catheterization    Narrative    1.  Mild non-occlusive CAD.  2.  Normal LVEDP 9mmHg.

## 2020-12-02 ENCOUNTER — MYC MEDICAL ADVICE (OUTPATIENT)
Dept: CARDIOLOGY | Facility: CLINIC | Age: 40
End: 2020-12-02

## 2020-12-02 DIAGNOSIS — R06.09 DOE (DYSPNEA ON EXERTION): Primary | ICD-10-CM

## 2020-12-02 NOTE — TELEPHONE ENCOUNTER
[employer] message received from patient. Will route to Dr. Barreto for review. Patient has follow up appointment with Quynh Waterman, 12/15/20.       Future Appointments   Date Time Provider Department Center   12/15/2020 11:00 AM Quynh Waterman PA-C [facility] RICHIE King RN December 2, 2020 12:04 PM

## 2020-12-07 NOTE — TELEPHONE ENCOUNTER
Anjum received from patient:      Good morning,     I wanted to reach out to see what our next course of action is? I've been still experiencing shortness of breath along with chest pains doing just simple things. I have not returned to work as I continue to feel not right. I know what I feel isn't right and don't want to make it worse going back to work. Can you please reach out to me today and let me know whats next? I was told we should schedule a Cardiac CT.      Thanks,  Pablito       Dr. Barreto had messaged him earlier today:   HI Jace,     We have excluded the very dangerous cardiac causes of shortness of breath that would take you out of work. The cardiac MRI is to confirm any abnormalities that were seen on the stress test nuclear test you had done. If there is nothing on cardiac MRI that can explain your symptoms we can perhaps have you do some pulmonary function testing and also see a lung doctor.     If possible to get MRI before visit with Quynh that may be ideal. In the meantime I have no problem with you returning to work since we excluded blocked coronary arteries and excluded any heart failure. But certainly only do what makes you feel most comfortable.     Hope this helps!     Dr. Barreto     Spoke to patient. He had not seen Dr. Matias message to him so I informed him of her thoughts and recommendations. Pt agreeable to have cardiac MRI before OV with Quynh More. Cardiac MRI scheduled. Message sent to scheduling to get him scheduled. Pt aware to watch for call from scheduling.

## 2020-12-08 ENCOUNTER — TELEPHONE (OUTPATIENT)
Dept: CARDIOLOGY | Facility: CLINIC | Age: 40
End: 2020-12-08

## 2020-12-08 ENCOUNTER — MYC MEDICAL ADVICE (OUTPATIENT)
Dept: CARDIOLOGY | Facility: CLINIC | Age: 40
End: 2020-12-08

## 2020-12-08 NOTE — TELEPHONE ENCOUNTER
[employer] message received from patient. Will route to Dr. Barreto for review.           DEZ King December 8, 2020 9:36 AM

## 2020-12-08 NOTE — LETTER
December 9, 2020    RE:  Jace Pratt 1980  [address] [facility] [address]          To whom it may concern:    This letter is to certify that Jace Pratt is able to return to work without restrictions from a cardiac standpoint.    Should you have any questions, please call [phone].    Sincerely,  Moni Barrteo MD      [facility]

## 2020-12-08 NOTE — TELEPHONE ENCOUNTER
RN spoke with [facility] this morning and informed them of error in approval. RN advised [facility] that approval needs to be applied to our ECHO at  and not order for ECHO that was never completed through a different system. RN spoke with representative Constance at [facility] who advised she would withdraw the previous approval and apply it to our ECHO completed on 11/18/20.  After 20 min hold the phone line was disconnected. RN called back and was put in contact with second representative Rosemary (see details below) who provided this RN with info that an approval could not be directly applied and that third party [facility] could not help with appeal and this would need to be started through [employer]. After 45 minutes on hold RN was subsequently connected with [employer] Representative who advised RN that the current claim # was [id number] and that letter of appeal along with records supporting why echo needed to be completed was sent (see fax and phone for appeal number below). RN will submit letter and supporting documentation to fax below. RN will update patient via [employer] with current status.     Claim: Rosemary   Case #: [id number]  Phone: [phone]     RN was transferred to [facility]     Appeal   Claim # [id number]  Fax: [phone]  Phone: [phone]      ----- Message from Moni Barreto MD sent at 11/30/2020  9:16 AM CST -----  Regarding: RE: PHI SECURE  [facility] can't be a [facility] to peer. Needs retroactive approval through [place].    This was simply because they approved an echo order apparently ordered through another system, but it was not done. Their approval needs to be applied to OUR echo which was done. He never had two echoes.    Can you follow up and let patient know we are working on this, it's the fault of [employer] and nothing on our end with ordering/billing. Should be something that can be sorted out however.    Moni  ----- Message -----  From: Darien Ferraro RN  Sent: 11/25/2020    1:42 PM CST  To: Moni Young MD  Subject: RE: PHI SECURE                                     ----- Message -----  From: Moni Young MD  Sent: 2020   1:37 PM CST  To: Darien Mccann RN  Subject: RE: PHI SECURE                                   What was linked to the order? He is having chest pain.     Darien can you perhaps see if it can be rerun through insurance with a different code? Active chest pain 100% warrants echo approval.    ----- Message -----  From: Charleen Blankenship  Sent: 2020   7:03 AM CST  To: Moni Young MD  Subject: PHI SECURE                                       Peer to Peer Request    Patient Name:  Ankush Pratt  :    1980  MRN:    [id number]    Dr. Young,    The authorization for procedure ECHO COMPLETE (46915) on date of service 20 has been denied. We were unsuccessful in obtaining approval through clinical review. A [id number] review can be done by calling the insurance/third party authorization vendor with the following information:    Insurance: [facility]  Auth Vendor:  [employer]  Phone:  [phone]  Due:  ASAP    Patient ID: [id number]  Case/Ref #: [id number]    Denial Reason: [employer] on behalf of [employer]  [address] [place], [place] [id number]  Telephone: [phone]  Fax: [phone]  Date: 2020 CONFIDENTIAL  DRUriah/Facility MONI YOUNG  [address] [place] [address]  Fax: [phone] From: [employer] on behalf of  [employer] [facility]  Phone: [phone]  Fax: [phone]  Urgent Action Needed: Information needed in order to complete the request for  member ANKUSH PRATT. Respond within 24 hours.    Urgent Action Needed: Information needed in order to complete the request for member  ANKUSH PRATT.  Date: 2020 Member number: [id number]  Member's name: ANKUSH PRATT  This fax is to inform you that a Medical Director has reviewed your request  for ANKUSH QUINTERO, procedure code(s):    24442 Transthoracic Echocardiography (TTE), a  special kind of picture of your heart    This notice is an effort to provide you with an opportunity to discuss this request. You may  request a physician to physician discussion regarding the case within 24 hours. You may speak  with a Medical Director by calling [phone] and provide the reference number  [id number]    Telephone: [phone]  Fax: [phone]  Urgent Action Needed: Information needed in order to complete the request for member  ANKUSH QUINTERO.  Date: 11/18/2020 Member number: [id number]  Member's name: ANKUSH QUINTERO    This fax is to inform you that a Medical Director has reviewed your request for ANKUSH QUINTERO, procedure code(s):  Procedure Description Quantity  Requested  Modifier (if  applicable)  76902 Transthoracic Echocardiography (TTE), a  special kind of picture of your heart    This notice is an effort to provide you with an opportunity to discuss this request. You may  request a physician to physician discussion regarding the case within 24 hours. You may speak  with a Medical Director by calling [phone] and provide the reference number  [id number]    Please complete this as soon as you are able and let me know when it is done.    Thank you,    Charleen BULLOCK [phone]  [facility]

## 2020-12-08 NOTE — TELEPHONE ENCOUNTER
Cas Mccormick,     I am sorry things are so stressful right now, I can only imagine. Unfortunately since cardiac testing was overall showing that your heart function is good, and no blocked arteries, if I fill out disability paperwork you may be declined as they will review all of these studies you just had done. Short term disability is usually granted for our patients who have had a large heart attack and blocked arteries needing bypass surgery or multiple stents, or congestive heart failure. Since you are still having shortness of breath, I had recommended seeing lung doctors and having pulmonary testing done if you are unable to go to work and if something shows up there then the disability paperwork can reflect that. I worry if I fill this out now it will be denied as we don't have enough evidence they  need to go with. Also recommend that you have an evaluation for the non heart causes of ongoing chest pain - esophagus, lung, etc. I'd be happy to relay this information to your primary care if you would like me to.     Best,   Dr. Barreto          Will MyChart patient back Dr. Barreto's response.     DEZ King December 8, 2020 1:14 PM

## 2020-12-09 NOTE — TELEPHONE ENCOUNTER
[employer] message received. RN will draft letter and reply to patient via [employer].       You can put it in [employer]. However I was taken off of work last Monday and now saying I should go back to work. Isn t that over 5 days? I still have a 155 Calcium Score don t I? With that I was told I m in the 99% for my age. It feels like now that the Angiogram showed no buildup it over? I still feel shortness of breath and pain in my chest daily. What am I supposed to do now?

## 2020-12-10 ENCOUNTER — HOSPITAL ENCOUNTER (OUTPATIENT)
Dept: CARDIOLOGY | Facility: CLINIC | Age: 40
Discharge: HOME | End: 2020-12-10
Attending: INTERNAL MEDICINE | Admitting: INTERNAL MEDICINE
Payer: COMMERCIAL

## 2020-12-10 DIAGNOSIS — R06.09 DOE (DYSPNEA ON EXERTION): ICD-10-CM

## 2020-12-10 PROCEDURE — 255N000002 HC RX 255 OP 636: Performed by: INTERNAL MEDICINE

## 2020-12-10 PROCEDURE — A9585 GADOBUTROL INJECTION: HCPCS | Performed by: INTERNAL MEDICINE

## 2020-12-10 PROCEDURE — 75561 CARDIAC MRI FOR MORPH W/DYE: CPT | Mod: 26 | Performed by: INTERNAL MEDICINE

## 2020-12-10 PROCEDURE — 75561 CARDIAC MRI FOR MORPH W/DYE: CPT

## 2020-12-10 RX ORDER — GADOBUTROL 604.72 MG/ML
6 INJECTION INTRAVENOUS ONCE
Status: DISCONTINUED | OUTPATIENT
Start: 2020-12-10 | End: 2020-12-11 | Stop reason: HOSPADM

## 2020-12-10 RX ORDER — GADOBUTROL 604.72 MG/ML
18 INJECTION INTRAVENOUS ONCE
Status: COMPLETED | OUTPATIENT
Start: 2020-12-10 | End: 2020-12-10

## 2020-12-10 RX ADMIN — GADOBUTROL 18 ML: 604.72 INJECTION INTRAVENOUS at 15:07

## 2020-12-15 ENCOUNTER — OFFICE VISIT (OUTPATIENT)
Dept: CARDIOLOGY | Facility: CLINIC | Age: 40
End: 2020-12-15
Payer: COMMERCIAL

## 2020-12-15 VITALS
HEIGHT: 77 IN | OXYGEN SATURATION: 98 % | HEART RATE: 66 BPM | WEIGHT: 306.9 LBS | SYSTOLIC BLOOD PRESSURE: 131 MMHG | BODY MASS INDEX: 36.24 KG/M2 | DIASTOLIC BLOOD PRESSURE: 89 MMHG

## 2020-12-15 DIAGNOSIS — I77.810 DILATED AORTIC ROOT (H): Primary | ICD-10-CM

## 2020-12-15 DIAGNOSIS — I25.118 CORONARY ARTERY DISEASE OF NATIVE ARTERY OF NATIVE HEART WITH STABLE ANGINA PECTORIS (H): ICD-10-CM

## 2020-12-15 PROCEDURE — 99214 OFFICE O/P EST MOD 30 MIN: CPT | Performed by: PHYSICIAN ASSISTANT

## 2020-12-15 RX ORDER — ATORVASTATIN CALCIUM 20 MG/1
20 TABLET, FILM COATED ORAL DAILY
Qty: 90 TABLET | Refills: 3 | COMMUNITY
Start: 2020-12-15 | End: 2021-07-07

## 2020-12-15 ASSESSMENT — MIFFLIN-ST. JEOR: SCORE: 2419.47

## 2020-12-15 NOTE — LETTER
12/15/2020    Luz KELSEY Onelia  [address] [place] [address]    RE: Jace Pratt       Dear Colleague,    I had the pleasure of seeing Jace Pratt in the [facility].    [id number]  HPI and Plan:   See dictation    Orders this Visit:  Orders Placed This Encounter   Procedures     Lipid Profile     ALT     Follow-Up with Cardiologist     Orders Placed This Encounter   Medications     atorvastatin (LIPITOR) 20 MG tablet     Sig: Take 1 tablet (20 mg) by mouth daily     Dispense:  90 tablet     Refill:  3     Medications Discontinued During This Encounter   Medication Reason     isosorbide mononitrate (IMDUR) 30 MG 24 hr tablet      atorvastatin (LIPITOR) 40 MG tablet          Encounter Diagnosis   Name Primary?     Coronary artery disease of native artery of native heart with stable angina pectoris (H)        CURRENT MEDICATIONS:  Current Outpatient Medications   Medication Sig Dispense Refill     atorvastatin (LIPITOR) 20 MG tablet Take 1 tablet (20 mg) by mouth daily 90 tablet 3     rivaroxaban ANTICOAGULANT (XARELTO) 20 MG TABS tablet Take 1 tablet (20 mg) by mouth daily (with dinner) 90 tablet 0     aspirin (ASA) 81 MG EC tablet Take 1 tablet (81 mg) by mouth daily (Patient not taking: Reported on 12/15/2020) 30 tablet 0     EPINEPHrine (ANY BX GENERIC EQUIV) 0.3 MG/0.3ML injection 2-pack Inject 0.3 mg into the muscle as needed for anaphylaxis         ALLERGIES     Allergies   Allergen Reactions     Bee Pollen Anaphylaxis     Other reaction(s): Unknown Reaction     Cefazolin Hives     Other reaction(s): Hives     Azithromycin Nausea and Vomiting     Latex Hives     Morphine Hives     Penicillins Nausea and Vomiting     Bupropion Other (See Comments)     Made him feel suicidal  Made him feel suicidal         PAST MEDICAL HISTORY:  Past Medical History:   Diagnosis Date     Acute saddle pulmonary embolism with acute cor pulmonale (H)      Anxiety      Ascending aortic  aneurysm (H)      CAD (coronary artery disease) 12/01/2020    Mild nonobstructive     Chest pain      Depression      ANTON (dyspnea on exertion)      Former smoker      Neuromuscular disorder (H)      S/P coronary angiogram 12/01/2020    No intervention     Stable angina (H)      Varicose veins of right lower extremity with edema        PAST SURGICAL HISTORY:  Past Surgical History:   Procedure Laterality Date     CV HEART CATHETERIZATION WITH POSSIBLE INTERVENTION N/A 12/1/2020    Procedure: Heart Catheterization with Possible Intervention;  Surgeon: Nurys Lopez MD;  Location:  HEART CARDIAC CATH LAB     CV LEFT HEART CATH N/A 12/1/2020    Procedure: Left Heart Cath;  Surgeon: Nurys Lopez MD;  Location:  HEART CARDIAC CATH LAB       FAMILY HISTORY:  Family History   Problem Relation Age of Onset     Clotting Disorder Maternal Grandmother        SOCIAL HISTORY:  Social History     Socioeconomic History     Marital status:      Spouse name: None     Number of children: None     Years of education: None     Highest education level: None   Occupational History     None   Social Needs     Financial resource strain: None     Food insecurity     Worry: None     Inability: None     Transportation needs     Medical: None     Non-medical: None   Tobacco Use     Smoking status: Former Smoker     Types: Cigarettes     Smokeless tobacco: Former User     Quit date: 2019   Substance and Sexual Activity     Alcohol use: Yes     Frequency: 2-4 times a month     Drinks per session: 1 or 2     Comment: A glass a wine a week      Drug use: Never     Sexual activity: None   Lifestyle     Physical activity     Days per week: None     Minutes per session: None     Stress: None   Relationships     Social connections     Talks on phone: None     Gets together: None     Attends Quaker service: None     Active member of club or organization: None     Attends meetings of clubs or organizations: None     Relationship  "status: None     Intimate partner violence     Fear of current or ex partner: None     Emotionally abused: None     Physically abused: None     Forced sexual activity: None   Other Topics Concern     Parent/sibling w/ CABG, MI or angioplasty before 65F 55M? No   Social History Narrative     None       Review of Systems:  Skin:  Negative     Eyes:  Negative    ENT:  Negative    Respiratory:  Positive for dyspnea on exertion;shortness of breath  Cardiovascular:    Positive for;heaviness;chest pain;palpitations  Gastroenterology: Negative    Genitourinary:  not assessed    Musculoskeletal:  Negative    Neurologic:  Positive for headaches  Psychiatric:  Negative    Heme/Lymph/Imm:  Negative    Endocrine:  Negative      Physical Exam:  Vitals: /89 (BP Location: Left arm, Patient Position: Sitting, Cuff Size: Adult Large)   Pulse 66   Ht 1.956 m (6' 5\")   Wt 139.2 kg (306 lb 14.4 oz)   SpO2 98%   BMI 36.39 kg/m     Please refer to dictation for physical exam    Recent Lab Results:  LIPID RESULTS:  No results found for: CHOL, HDL, LDL, TRIG, CHOLHDLRATIO    LIVER ENZYME RESULTS:  Lab Results   Component Value Date    AST 22 04/20/2019    ALT 48 04/20/2019       CBC RESULTS:  Lab Results   Component Value Date    WBC 7.4 12/01/2020    RBC 5.56 12/01/2020    HGB 16.4 12/01/2020    HCT 48.5 12/01/2020    MCV 87 12/01/2020    MCH 29.5 12/01/2020    MCHC 33.8 12/01/2020    RDW 13.4 12/01/2020     12/01/2020       BMP RESULTS:  Lab Results   Component Value Date     12/01/2020    POTASSIUM 4.0 12/01/2020    CHLORIDE 106 12/01/2020    CO2 27 12/01/2020    ANIONGAP 6 12/01/2020     (H) 12/01/2020    BUN 16 12/01/2020    CR 1.17 12/01/2020    GFRESTIMATED 77 12/01/2020    GFRESTBLACK 90 12/01/2020    JANIE 8.8 12/01/2020        A1C RESULTS:  No results found for: A1C    INR RESULTS:  Lab Results   Component Value Date    INR 1.29 (H) 04/20/2019           CC  No referring provider defined for this " encounter.          Thank you for allowing me to participate in the care of your patient.      Sincerely,     Quynh Waterman PA-C     [facility]    cc:   No referring provider defined for this encounter.

## 2020-12-15 NOTE — PROGRESS NOTES
Service Date: 12/15/2020      PRIMARY CARDIOLOGIST:  Dr. Ashley Barreto.      REASON FOR VISIT:  Nonocclusive coronary artery disease, chest pain.      HISTORY OF PRESENT ILLNESS:  Mr. Pratt is a pleasant 40-year-old gentleman with past medical history significant for saddle PE requiring catheter-directed lytic at [facility] in 03/2019, family history of PE and CVA, as well as a sudden cardiac death, depression, anxiety, ascending aortic aneurysm at 4.2 cm and nonocclusive coronary artery disease.  He initially presented with Dr. Barerto for concerns of chest discomfort.  He underwent a calcium score that put him in the 99th percentile and had a treadmill stress test with an inferior, inferolateral defect consistent with an infarct.  At that point, he had presented to me with chest pain and we set him up for coronary angiogram.  He did present to the ER.  He underwent angiogram and this showed just a 10% proximal and mid-LAD lesion and a 10% proximal to mid RCA lesion.  There was no intervention.  Given he still had ongoing pain, he then underwent a cardiac MRI.  This showed a normal EF, a low normal RV, EF at 52 and a small left ventricular diverticulum in the basal inferior portion of the left ventricle at 9 x 9.6 mm.      He is here today for followup.  He continues to have chest pain.  He has good days and bad days.  He does not think it is necessarily better than it was before.  He has GI referral and they have called him, but he has not set up followup.  He is planning on doing that in the new year.  He has continued to have intermittent episodes of problems breathing and gets winded climbing up and down stairs.  He denies orthopnea or PND.  He did not continue taking his aspirin or his atorvastatin after discharge from the hospital, but has been taking his Xarelto.      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  He chews tobacco.  He is engaged to his [employer]ch[employer]aren, and  have 5 children between them, 3 from previous relationship and she has 2.  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      PHYSICAL EXAMINATION:   GENERAL:  Well-developed, well-nourished gentleman in no acute distress.   HEENT:  Normocephalic, atraumatic.   HEART:  Regular in rate and rhythm.  I do not appreciate murmur, rub or gallop.   LUNGS:  Clear without wheezes, rales or rhonchi.   EXTREMITIES:  Without peripheral edema.  Right radial access site is clean, dry and intact without bruit.      ASSESSMENT AND PLAN:   1.  Nonocclusive coronary artery disease.  I discussed today the benefits of aspirin and statin for secondary prevention long term.  He is agreeable to retry Lipitor and we will start at 20 mg a day to help prevent side effects.  In addition, he should be on aspirin.  I did warn him if this upsets his stomach he should take with food or switch to enteric-coated and this can be 81 mg.   2.  History of saddle PE.  This has been ruled out earlier this fall.   3.  Left ventricular diverticulum noted on cardiac MRI.  Of note, this is not an aneurysm or pseudoaneurysm.  This is a congenital issue.  It is not symptomatic nor is at risk for rupture.   4.  Ongoing chest pain, shortness of breath, I strongly encouraged this gentleman to follow up with GI and Pulmonary if need be.  There is a chance he has some sort of esophagitis or gastritis as well as adult-onset asthma.      From a heart standpoint, we will see him back in 1 year.  We will get an echocardiogram at that time.  Dr. Barreto will be seeing him sooner with concerns.      Thank you for allowing me to participate in his care.         CARRIE ORTEGA PA-C             D: 12/15/2020   T: 12/15/2020   MT: melissa      Name:     ANKUSH QUINTERO   MRN:      [id number]        Account:      [id number]   :      1980           Service Date:  12/15/2020      Document: [id number]

## 2020-12-15 NOTE — PROGRESS NOTES
[id number]  HPI and Plan:   See dictation    Orders this Visit:  Orders Placed This Encounter   Procedures     Lipid Profile     ALT     Follow-Up with Cardiologist     Orders Placed This Encounter   Medications     atorvastatin (LIPITOR) 20 MG tablet     Sig: Take 1 tablet (20 mg) by mouth daily     Dispense:  90 tablet     Refill:  3     Medications Discontinued During This Encounter   Medication Reason     isosorbide mononitrate (IMDUR) 30 MG 24 hr tablet      atorvastatin (LIPITOR) 40 MG tablet          Encounter Diagnosis   Name Primary?     Coronary artery disease of native artery of native heart with stable angina pectoris (H)        CURRENT MEDICATIONS:  Current Outpatient Medications   Medication Sig Dispense Refill     atorvastatin (LIPITOR) 20 MG tablet Take 1 tablet (20 mg) by mouth daily 90 tablet 3     rivaroxaban ANTICOAGULANT (XARELTO) 20 MG TABS tablet Take 1 tablet (20 mg) by mouth daily (with dinner) 90 tablet 0     aspirin (ASA) 81 MG EC tablet Take 1 tablet (81 mg) by mouth daily (Patient not taking: Reported on 12/15/2020) 30 tablet 0     EPINEPHrine (ANY BX GENERIC EQUIV) 0.3 MG/0.3ML injection 2-pack Inject 0.3 mg into the muscle as needed for anaphylaxis         ALLERGIES     Allergies   Allergen Reactions     Bee Pollen Anaphylaxis     Other reaction(s): Unknown Reaction     Cefazolin Hives     Other reaction(s): Hives     Azithromycin Nausea and Vomiting     Latex Hives     Morphine Hives     Penicillins Nausea and Vomiting     Bupropion Other (See Comments)     Made him feel suicidal  Made him feel suicidal         PAST MEDICAL HISTORY:  Past Medical History:   Diagnosis Date     Acute saddle pulmonary embolism with acute cor pulmonale (H)      Anxiety      Ascending aortic aneurysm (H)      CAD (coronary artery disease) 12/01/2020    Mild nonobstructive     Chest pain      Depression      ANTON (dyspnea on exertion)      Former smoker      Neuromuscular disorder (H)      S/P coronary angiogram  12/01/2020    No intervention     Stable angina (H)      Varicose veins of right lower extremity with edema        PAST SURGICAL HISTORY:  Past Surgical History:   Procedure Laterality Date     CV HEART CATHETERIZATION WITH POSSIBLE INTERVENTION N/A 12/1/2020    Procedure: Heart Catheterization with Possible Intervention;  Surgeon: Nurys Lopez MD;  Location:  HEART CARDIAC CATH LAB     CV LEFT HEART CATH N/A 12/1/2020    Procedure: Left Heart Cath;  Surgeon: Nurys Lopez MD;  Location:  HEART CARDIAC CATH LAB       FAMILY HISTORY:  Family History   Problem Relation Age of Onset     Clotting Disorder Maternal Grandmother        SOCIAL HISTORY:  Social History     Socioeconomic History     Marital status:      Spouse name: None     Number of children: None     Years of education: None     Highest education level: None   Occupational History     None   Social Needs     Financial resource strain: None     Food insecurity     Worry: None     Inability: None     Transportation needs     Medical: None     Non-medical: None   Tobacco Use     Smoking status: Former Smoker     Types: Cigarettes     Smokeless tobacco: Former User     Quit date: 2019   Substance and Sexual Activity     Alcohol use: Yes     Frequency: 2-4 times a month     Drinks per session: 1 or 2     Comment: A glass a wine a week      Drug use: Never     Sexual activity: None   Lifestyle     Physical activity     Days per week: None     Minutes per session: None     Stress: None   Relationships     Social connections     Talks on phone: None     Gets together: None     Attends Gnosticist service: None     Active member of club or organization: None     Attends meetings of clubs or organizations: None     Relationship status: None     Intimate partner violence     Fear of current or ex partner: None     Emotionally abused: None     Physically abused: None     Forced sexual activity: None   Other Topics Concern     Parent/sibling w/ CABG,  "MI or angioplasty before 65F 55M? No   Social History Narrative     None       Review of Systems:  Skin:  Negative     Eyes:  Negative    ENT:  Negative    Respiratory:  Positive for dyspnea on exertion;shortness of breath  Cardiovascular:    Positive for;heaviness;chest pain;palpitations  Gastroenterology: Negative    Genitourinary:  not assessed    Musculoskeletal:  Negative    Neurologic:  Positive for headaches  Psychiatric:  Negative    Heme/Lymph/Imm:  Negative    Endocrine:  Negative      Physical Exam:  Vitals: /89 (BP Location: Left arm, Patient Position: Sitting, Cuff Size: Adult Large)   Pulse 66   Ht 1.956 m (6' 5\")   Wt 139.2 kg (306 lb 14.4 oz)   SpO2 98%   BMI 36.39 kg/m     Please refer to dictation for physical exam    Recent Lab Results:  LIPID RESULTS:  No results found for: CHOL, HDL, LDL, TRIG, CHOLHDLRATIO    LIVER ENZYME RESULTS:  Lab Results   Component Value Date    AST 22 04/20/2019    ALT 48 04/20/2019       CBC RESULTS:  Lab Results   Component Value Date    WBC 7.4 12/01/2020    RBC 5.56 12/01/2020    HGB 16.4 12/01/2020    HCT 48.5 12/01/2020    MCV 87 12/01/2020    MCH 29.5 12/01/2020    MCHC 33.8 12/01/2020    RDW 13.4 12/01/2020     12/01/2020       BMP RESULTS:  Lab Results   Component Value Date     12/01/2020    POTASSIUM 4.0 12/01/2020    CHLORIDE 106 12/01/2020    CO2 27 12/01/2020    ANIONGAP 6 12/01/2020     (H) 12/01/2020    BUN 16 12/01/2020    CR 1.17 12/01/2020    GFRESTIMATED 77 12/01/2020    GFRESTBLACK 90 12/01/2020    AJNIE 8.8 12/01/2020        A1C RESULTS:  No results found for: A1C    INR RESULTS:  Lab Results   Component Value Date    INR 1.29 (H) 04/20/2019           CC  No referring provider defined for this encounter.      "

## 2020-12-15 NOTE — LETTER
12/15/2020      Luz Urbanocatrina  [address] [place] [address]      RE: Jace PEREZ Terence       Dear Colleague,    I had the pleasure of seeing Jace Pratt in the [facility].    Service Date: 12/15/2020      PRIMARY CARDIOLOGIST:  Dr. Ashley Barreto.      REASON FOR VISIT:  Nonocclusive coronary artery disease, chest pain.      HISTORY OF PRESENT ILLNESS:  Mr. Pratt is a pleasant 40-year-old gentleman with past medical history significant for saddle PE requiring catheter-directed lytic at [facility] in 03/2019, family history of PE and CVA, as well as a sudden cardiac death, depression, anxiety, ascending aortic aneurysm at 4.2 cm and nonocclusive coronary artery disease.  He initially presented with Dr. Barreto for concerns of chest discomfort.  He underwent a calcium score that put him in the 99th percentile and had a treadmill stress test with an inferior, inferolateral defect consistent with an infarct.  At that point, he had presented to me with chest pain and we set him up for coronary angiogram.  He did present to the ER.  He underwent angiogram and this showed just a 10% proximal and mid-LAD lesion and a 10% proximal to mid RCA lesion.  There was no intervention.  Given he still had ongoing pain, he then underwent a cardiac MRI.  This showed a normal EF, a low normal RV, EF at 52 and a small left ventricular diverticulum in the basal inferior portion of the left ventricle at 9 x 9.6 mm.      He is here today for followup.  He continues to have chest pain.  He has good days and bad days.  He does not think it is necessarily better than it was before.  He has GI referral and they have called him, but he has not set up followup.  He is planning on doing that in the new year.  He has continued to have intermittent episodes of problems breathing and gets winded climbing up and down stairs.  He denies orthopnea or PND.  He did not continue taking his aspirin or his  atorvastatin after discharge from the hospital, but has been taking his Xarelto.      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  He chews tobacco.  He is engaged to his fiancee, Karen, and have 5 children between them, 3 from previous relationship and she has 2.  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      PHYSICAL EXAMINATION:   GENERAL:  Well-developed, well-nourished gentleman in no acute distress.   HEENT:  Normocephalic, atraumatic.   HEART:  Regular in rate and rhythm.  I do not appreciate murmur, rub or gallop.   LUNGS:  Clear without wheezes, rales or rhonchi.   EXTREMITIES:  Without peripheral edema.  Right radial access site is clean, dry and intact without bruit.      ASSESSMENT AND PLAN:   1.  Nonocclusive coronary artery disease.  I discussed today the benefits of aspirin and statin for secondary prevention long term.  He is agreeable to retry Lipitor and we will start at 20 mg a day to help prevent side effects.  In addition, he should be on aspirin.  I did warn him if this upsets his stomach he should take with food or switch to enteric-coated and this can be 81 mg.   2.  History of saddle PE.  This has been ruled out earlier this fall.   3.  Left ventricular diverticulum noted on cardiac MRI.  Of note, this is not an aneurysm or pseudoaneurysm.  This is a congenital issue.  It is not symptomatic nor is at risk for rupture.   4.  Ongoing chest pain, shortness of breath, I strongly encouraged this gentleman to follow up with GI and Pulmonary if need be.  There is a chance he has some sort of esophagitis or gastritis as well as adult-onset asthma.      From a heart standpoint, we will see him back in 1 year.  We will get an echocardiogram at that time.  Dr. Barreto will be seeing him sooner with concerns.      Thank you for allowing me to participate in his care.          CARRIE WATERMAN PA-C             D: 12/15/2020   T: 12/15/2020   MT: melissa      Name:     ANKUSH QUINTERO   MRN:      [id number]        Account:      [id number]   :      1980           Service Date: 12/15/2020      Document: [id number]          Outpatient Encounter Medications as of 12/15/2020   Medication Sig Dispense Refill     atorvastatin (LIPITOR) 20 MG tablet Take 1 tablet (20 mg) by mouth daily 90 tablet 3     rivaroxaban ANTICOAGULANT (XARELTO) 20 MG TABS tablet Take 1 tablet (20 mg) by mouth daily (with dinner) 90 tablet 0     aspirin (ASA) 81 MG EC tablet Take 1 tablet (81 mg) by mouth daily (Patient not taking: Reported on 12/15/2020) 30 tablet 0     EPINEPHrine (ANY BX GENERIC EQUIV) 0.3 MG/0.3ML injection 2-pack Inject 0.3 mg into the muscle as needed for anaphylaxis       [DISCONTINUED] atorvastatin (LIPITOR) 40 MG tablet Take 1 tablet (40 mg) by mouth daily (Patient not taking: Reported on 12/15/2020) 90 tablet 3     [DISCONTINUED] isosorbide mononitrate (IMDUR) 30 MG 24 hr tablet Take 1 tablet (30 mg) by mouth daily (Patient not taking: Reported on 12/15/2020) 30 tablet 11     No facility-administered encounter medications on file as of 12/15/2020.        Again, thank you for allowing me to participate in the care of your patient.      Sincerely,    Carrie Waterman PA-C     [facility]

## 2020-12-15 NOTE — PATIENT INSTRUCTIONS
Thank you for coming into [facility] clinic today.    We discussed: the great news is that your pain isn't coming from your heart.    I still think you should see GI to see if the pain is coming from your stomach or esophagus.      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  This does NOT cause pain and is NOT dangerous.      Medication changes:  Start take aspirin 81 mg once a day.    Start lipitor/ atorvastatin at 20 mg once a day.  This will be 1/2 pill once a day at night.  This is to prevent your arteries from narrowing further and prevent a heart attack over the next 20-30 years.      Follow up: with Dr. Barreto in 1 year.      Please call my nurse at [phone] with any questions or concerns.    Scheduling phone number: [phone]  Reminder: Please bring in all current medications, over the counter supplements and vitamin bottles to your next appointment.

## 2021-02-12 DIAGNOSIS — I26.02 ACUTE SADDLE PULMONARY EMBOLISM WITH ACUTE COR PULMONALE (H): ICD-10-CM

## 2021-02-17 ENCOUNTER — TELEPHONE (OUTPATIENT)
Dept: CARDIOLOGY | Facility: CLINIC | Age: 41
End: 2021-02-17

## 2021-02-17 NOTE — TELEPHONE ENCOUNTER
[facility] digestive health personal Marilyn at ([phone]) left a voice message with a request for a  Day hold of Xarelto for upcoming colonoscopy currently scheduled or 3/5/21.  Patient of Quynh Waterman and Dr. Josephs.     Will message Quynh Waterman for recommendations on the xarelto hold and question if bridging is needed.

## 2021-02-17 NOTE — TELEPHONE ENCOUNTER
Return call from [facility] received. Stated that the primary is not the prescriber of the Xarelto. He has not seen a hematologist.    In the chart review, it has been refilled prior by Dr. Barreto. It was originally placed by the ED or hospitalist.    Will message Dr Barreto for recommendations on the requested 3 day hold of the Xarelto for the upcoming colonoscopy scheduled on 3/5/21.

## 2021-02-17 NOTE — TELEPHONE ENCOUNTER
Update called to [facility] that they should touch base with primary or hematology for their input on the Xarelto hold request.

## 2021-02-17 NOTE — TELEPHONE ENCOUNTER
We don't manage the anticoagulation for his PE,  I think it likely would be fine to hold for 3 days, but PCP or hem whomever manages it should make the decision.

## 2021-02-18 NOTE — TELEPHONE ENCOUNTER
Moni Barreto MD Herdan, Judy, RN; [facility] Heart Team 4 19 minutes ago (9:47 AM)     And just FYI current guidelines suggest 48 hour holds are sufficient.    Message text       Moni Barreto MD Herdan, Judy, RN; [facility] Heart Team 4 19 minutes ago (9:46 AM)     That is fine with me as long as 6 month therapy complete    Message text      Contacted [facility] to review Dr. Barreto's recommendations. [facility] verbalized understanding and agreed with plan of care. No further questions.

## 2021-03-10 ENCOUNTER — IMMUNIZATION (OUTPATIENT)
Dept: NURSING | Facility: CLINIC | Age: 41
End: 2021-03-10
Payer: COMMERCIAL

## 2021-03-10 PROCEDURE — 0031A PR COVID VAC JANSSEN AD26 0.5ML: CPT

## 2021-03-10 PROCEDURE — 91303 PR COVID VAC JANSSEN AD26 0.5ML: CPT

## 2021-04-13 ENCOUNTER — TELEPHONE (OUTPATIENT)
Dept: CARDIOLOGY | Facility: CLINIC | Age: 41
End: 2021-04-13

## 2021-04-13 ENCOUNTER — MYC MEDICAL ADVICE (OUTPATIENT)
Dept: CARDIOLOGY | Facility: CLINIC | Age: 41
End: 2021-04-13

## 2021-04-13 ENCOUNTER — HOSPITAL ENCOUNTER (EMERGENCY)
Facility: CLINIC | Age: 41
Discharge: HOME | End: 2021-04-13
Admitting: EMERGENCY MEDICINE
Payer: COMMERCIAL

## 2021-04-13 VITALS
SYSTOLIC BLOOD PRESSURE: 145 MMHG | OXYGEN SATURATION: 95 % | TEMPERATURE: 98.7 F | DIASTOLIC BLOOD PRESSURE: 89 MMHG | WEIGHT: 300 LBS | RESPIRATION RATE: 16 BRPM | HEART RATE: 78 BPM | HEIGHT: 77 IN | BODY MASS INDEX: 35.42 KG/M2

## 2021-04-13 LAB — INTERPRETATION ECG - MUSE: NORMAL

## 2021-04-13 PROCEDURE — 999N000104 HC STATISTIC NO CHARGE

## 2021-04-13 PROCEDURE — 93005 ELECTROCARDIOGRAM TRACING: CPT

## 2021-04-13 ASSESSMENT — MIFFLIN-ST. JEOR: SCORE: 2388.17

## 2021-04-13 NOTE — ED TRIAGE NOTES
Pt had the J and J vaccination March 10th - he is having headaches , shortness of breath and has embolism hx.

## 2021-04-13 NOTE — TELEPHONE ENCOUNTER
"I received the Valentín and Valentín vaccine about a month ago and in the last 4 weeks I have had shortness of breath, pain in my leg, etc. wondering if I should schedule an appointment to get checked out due to my previous health concern.      Thank you   Pablito Pratt    I called patient to reply to his Mychart message and concern.    He tells me that the shortness of breath 'kinds comes and goes\" and the leg pain is only his right leg that goes from the calf up t his knee and it feels warm.    He has a history of a saddle PE and is on Xarelto. He has non obstructive CAD and I told him that the shortness of breath is unlikely from his heart.    I recommended he go to the ED so that imaging studies can be performed to R/O PE/DVT rather than an office visit.    I told him not to wait long on going to the ED and he said that he will go. I asked that he keep us posted.  "

## 2021-07-06 NOTE — PROGRESS NOTES
SUBJECTIVE:   CC: Jace Pratt is an 40 year old male who presents for preventative health visit.     Patient has been advised of split billing requirements and indicates understanding: Yes  Healthy Habits:    Getting at least 3 servings of Calcium per day:  Yes    Bi-annual eye exam:  Yes    Dental care twice a year:  Yes    Sleep apnea or symptoms of sleep apnea:  Sleep apnea, Excessive snoring and Daytime drowsiness    Diet:  Regular (no restrictions)    Frequency of exercise:  2-3 days/week    Duration of exercise:  30-45 minutes    Taking medications regularly:  Yes    Barriers to taking medications:  Problems remembering to take them    Medication side effects:  None    PHQ-2 Total Score:    Additional concerns today:  Yes    HPI:      Abdominal Pain   This is a new problem. The current episode started in the past 7 days. The problem occurs constantly. The problem has been gradually worsening. The pain is located in the epigastric region. The quality of the pain is sharp. The pain is at a severity of 7/10. Associated symptoms include melena, constipation, arthralgias and myalgias. Pertinent negatives include anorexia, fever, belching, diarrhea, flatus, hematochezia, nausea, vomiting, dysuria, frequency, hematuria and headaches. The symptoms are aggravated by movement. The symptoms are relieved by recumbency.      First started 1 week ago.  Located in epigastric region, constant.  Will be painful with walking and notices that it is hard to take a deep breath.  Not associated with food intake.  Worsening pain with sneezing. No nausea/vomiting.  Green bowel movements, tarry.  No melena.  No constipation or diarrhea.  No NSAIDS, aspirin.    No history of acid reflux.  Tried Pepto without improvement.    Has been off Xarelto and Lipitor for the past 3 months.      Was supposed to do a colonoscopy, but then got sick.    Left lower abdominal pain, which has been chronic.     Sleep apnea concern:     +Snoring, witnessed apnea by wife, daytime fatigue.      Tobacco:  Quit chewing tobacco in January.    Has been using nicotine poutches since January.      Social:   since January, 5 children together - blended family.    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      Review of Systems   Constitutional: Negative for fever.   Gastrointestinal: Positive for abdominal pain. Negative for anorexia, diarrhea, flatus, hematochezia, nausea and vomiting.   Genitourinary: Negative for dysuria, frequency and hematuria.   Musculoskeletal: Positive for arthralgias and myalgias.   Neurological: Negative for headaches.      Constitutional, HEENT, cardiovascular, pulmonary, gi and gu systems are negative, except as otherwise noted.          Answers for HPI/ROS submitted by the patient on 7/7/2021   Abdominal pain  Onset quality: gradual  Episode duration: 12 hours  Radiates to: epigastric region  weight loss: No  Diagnostic workup: CT scan, ultrasound      Today's PHQ-2 Score:   PHQ-2 ( 1999 [employer]) 8/4/2020   Q1: Little interest or pleasure in doing things 0   Q2: Feeling down, depressed or hopeless 0   PHQ-2 Score 0       Social History     Tobacco Use     Smoking status: Former Smoker     Types: Cigarettes     Smokeless tobacco: Former User     Quit date: 2019   Substance Use Topics     Alcohol use: Yes     Frequency: 2-4 times a month     Drinks per session: 1 or 2     Comment: A glass a wine a week      Last PSA: No results found for: PSA    Reviewed orders with patient. Reviewed health maintenance and updated orders accordingly - Yes  BP Readings from Last 3 Encounters:   07/07/21 122/76   12/15/20 131/89   12/01/20 (!) 144/76    Wt Readings from Last 3 Encounters:   07/07/21 138.8 kg (306 lb)   12/15/20 139.2 kg (306 lb 14.4 oz)   12/01/20 136.1 kg (300 lb)                  Patient Active Problem List   Diagnosis     Stable angina pectoris (H)     Elevated coronary artery calcium score      History of pulmonary embolism     Hx of major depression     Ascending aortic aneurysm (H)     Varicose veins of right lower extremity with edema     Chest pain, unspecified type     Past Surgical History:   Procedure Laterality Date     ANKLE SURGERY Right 2019    CMT     APPENDECTOMY  2007     CHOLECYSTECTOMY  2008     CV HEART CATHETERIZATION WITH POSSIBLE INTERVENTION N/A 12/1/2020    Procedure: Heart Catheterization with Possible Intervention;  Surgeon: Nurys Lopez MD;  Location:  HEART CARDIAC CATH LAB     CV LEFT HEART CATH N/A 12/1/2020    Procedure: Left Heart Cath;  Surgeon: Nurys Lopez MD;  Location:  HE[facility] CARD[facility] CATH LAB       Social History     Tobacco Use     Smoking status: Former Smoker     Types: Cigarettes     Smokeless tobacco: Former User     Quit date: 2019   Substance Use Topics     Alcohol use: Yes     Frequency: 2-4 times a month     Drinks per session: 1 or 2     Comment: A glass a wine a week      Family History   Problem Relation Age of Onset     Depression Mother      Depression Father      Clotting Disorder Maternal Grandmother      Depression Brother          Current Outpatient Medications   Medication Sig Dispense Refill     atorvastatin (LIPITOR) 20 MG tablet Take 1 tablet (20 mg) by mouth daily 90 tablet 1     EPINEPHrine (ANY BX GENERIC EQUIV) 0.3 MG/0.3ML injection 2-pack Inject 0.3 mLs (0.3 mg) into the muscle as needed for anaphylaxis 2 each 1     omeprazole (PRILOSEC) 40 MG DR capsule Take 1 capsule (40 mg) by mouth daily 30 capsule 2     rivaroxaban ANTICOAGULANT (XARELTO) 20 MG TABS tablet Take 1 tablet (20 mg) by mouth daily (with dinner) 90 tablet 1     sucralfate (CARAFATE) 1 GM tablet Take 1 tablet (1 g) by mouth 4 times daily as needed (epigastric pain) 60 tablet 1       Reviewed and updated as needed this visit by clinical staff  Tobacco  Allergies  Meds  Problems             Reviewed and updated as needed this visit by Provider    Meds   "Problems            Past Medical History:   Diagnosis Date     Acute saddle pulmonary embolism with acute cor pulmonale (H)      Anxiety      Ascending aortic aneurysm (H)      CAD (coronary artery disease) 12/01/2020    Mild nonobstructive     Chest pain      Depression      ANTON (dyspnea on exertion)      Former smoker      Neuromuscular disorder (H)      S/P coronary angiogram 12/01/2020    No intervention     Stable angina (H)      Varicose veins of right lower extremity with edema       Past Surgical History:   Procedure Laterality Date     ANKLE SURGERY Right 2019    CMT     APPENDECTOMY  2007     CHOLECYSTECTOMY  2008     CV HEART CATHETERIZATION WITH POSSIBLE INTERVENTION N/A 12/1/2020    Procedure: Heart Catheterization with Possible Intervention;  Surgeon: Nurys Lopez MD;  Location:  HEART CARDIAC CATH LAB     CV LEFT HEART CATH N/A 12/1/2020    Procedure: Left Heart Cath;  Surgeon: Nurys Lopez MD;  Location:  HEART CARDIAC CATH LAB         OBJECTIVE:   /76 (BP Location: Left arm, Cuff Size: Adult Large)   Pulse 81   Temp 97  F (36.1  C) (Tympanic)   Ht 1.956 m (6' 5\")   Wt 138.8 kg (306 lb)   SpO2 92%   BMI 36.29 kg/m      Physical Exam    GENERAL: healthy, alert and no distress  EYES: Eyes grossly normal to inspection, PERRL and conjunctivae and sclerae normal  HENT: ear canals and TM's normal, nose and mouth without ulcers or lesions  RESP: lungs clear to auscultation - no rales, rhonchi or wheezes  CV: regular rate and rhythm, normal S1 S2, no S3 or S4, no murmur, click or rub, no peripheral edema   ABDOMEN: soft, epigastric region with marked tenderness to palpation, LLQ with tenderness to palpation, no hepatosplenomegaly, no masses and bowel sounds normal  SKIN: no suspicious lesions or rashes  NEURO: Normal strength and tone, mentation intact and speech normal  PSYCH: mentation appears normal, affect normal/bright      ASSESSMENT/PLAN:     Jace was seen today for " abdominal pain.    Diagnoses and all orders for this visit:    Routine history and physical examination of adult  Encounter for hepatitis C screening test for low risk patient  -     Hepatitis C antibody    Abdominal pain, epigastric  Gastritis versus GERD. Will treat with PPI and sucralfate for acute gastritis.  Labs pending.   Plan to proceed with EGD as well.    -     CBC with platelets  -     Comprehensive metabolic panel (BMP + Alb, Alk Phos, ALT, AST, Total. Bili, TP)  -     GASTROENTEROLOGY ADULT REF PROCEDURE ONLY; Future  -     sucralfate (CARAFATE) 1 GM tablet; Take 1 tablet (1 g) by mouth 4 times daily as needed (epigastric pain)  -     omeprazole (PRILOSEC) 40 MG DR capsule; Take 1 capsule (40 mg) by mouth daily    Left lower quadrant pain  Chronic, plan to proceed with GI referral/consultation and colonoscopy.    -     GASTROENTEROLOGY ADULT REF PROCEDURE ONLY; Future  -     GASTROENTEROLOGY ADULT REF CONSULT ONLY; Future    Coronary artery disease of native artery of native heart with stable angina pectoris (H)  Last seen by cardiology in December 2020.  History of non-occlusive CAD.    Due for cardiology follow-up in December 2021.    -     atorvastatin (LIPITOR) 20 MG tablet; Take 1 tablet (20 mg) by mouth daily        -     Lipid panel reflex to direct LDL Fasting    Acute saddle pulmonary embolism with acute cor pulmonale (H)  Saddle PE requiring catheter-directed lytic at [facility] in March 2019.    Currently on Xarelto 20 mg daily.    -     rivaroxaban ANTICOAGULANT (XARELTO) 20 MG TABS tablet; Take 1 tablet (20 mg) by mouth daily (with dinner)    Snoring  Plan further consultation with sleep medicine.    -     SLEEP EVALUATION & MANAGEMENT REFERRAL - ADULT -[facility]  [phone] (Age 18 and up); Future    Bee allergy status  -     EPINEPHrine (ANY BX GENERIC EQUIV) 0.3 MG/0.3ML injection 2-pack; Inject 0.3 mLs (0.3 mg) into the muscle as needed for  "anaphylaxis    Other orders  -     TDAP VACCINE (Adacel, Boostrix)  [[id number]]        COUNSELING:   Reviewed preventive health counseling, as reflected in patient instructions    Estimated body mass index is 36.29 kg/m  as calculated from the following:    Height as of this encounter: 1.956 m (6' 5\").    Weight as of this encounter: 138.8 kg (306 lb).         He reports that he has quit smoking. His smoking use included cigarettes. He quit smokeless tobacco use about 2 years ago.      Counseling Resources:  ATP IV Guidelines  Pooled Cohorts Equation Calculator  FRAX Risk Assessment  ICSI Preventive Guidelines  Dietary Guidelines for Americans, 2010  USDA's MyPlate  ASA Prophylaxis  Lung CA Screening    Deb Peoples, LEONEL [facility]  "

## 2021-07-07 ENCOUNTER — OFFICE VISIT (OUTPATIENT)
Dept: FAMILY MEDICINE | Facility: CLINIC | Age: 41
End: 2021-07-07
Payer: COMMERCIAL

## 2021-07-07 ENCOUNTER — HOSPITAL ENCOUNTER (OUTPATIENT)
Facility: CLINIC | Age: 41
End: 2021-07-07
Attending: INTERNAL MEDICINE | Admitting: INTERNAL MEDICINE
Payer: COMMERCIAL

## 2021-07-07 VITALS
DIASTOLIC BLOOD PRESSURE: 76 MMHG | OXYGEN SATURATION: 92 % | HEIGHT: 77 IN | BODY MASS INDEX: 36.13 KG/M2 | TEMPERATURE: 97 F | HEART RATE: 81 BPM | WEIGHT: 306 LBS | SYSTOLIC BLOOD PRESSURE: 122 MMHG

## 2021-07-07 DIAGNOSIS — Z00.00 ROUTINE HISTORY AND PHYSICAL EXAMINATION OF ADULT: Primary | ICD-10-CM

## 2021-07-07 DIAGNOSIS — I26.02 ACUTE SADDLE PULMONARY EMBOLISM WITH ACUTE COR PULMONALE (H): ICD-10-CM

## 2021-07-07 DIAGNOSIS — Z91.030 BEE ALLERGY STATUS: ICD-10-CM

## 2021-07-07 DIAGNOSIS — R10.32 LEFT LOWER QUADRANT PAIN: ICD-10-CM

## 2021-07-07 DIAGNOSIS — I25.118 CORONARY ARTERY DISEASE OF NATIVE ARTERY OF NATIVE HEART WITH STABLE ANGINA PECTORIS (H): ICD-10-CM

## 2021-07-07 DIAGNOSIS — R06.83 SNORING: ICD-10-CM

## 2021-07-07 DIAGNOSIS — R10.13 ABDOMINAL PAIN, EPIGASTRIC: ICD-10-CM

## 2021-07-07 DIAGNOSIS — Z11.59 ENCOUNTER FOR HEPATITIS C SCREENING TEST FOR LOW RISK PATIENT: ICD-10-CM

## 2021-07-07 DIAGNOSIS — Z11.59 ENCOUNTER FOR SCREENING FOR OTHER VIRAL DISEASES: ICD-10-CM

## 2021-07-07 LAB
ERYTHROCYTE [DISTWIDTH] IN BLOOD BY AUTOMATED COUNT: 13.5 % (ref 10–15)
HCT VFR BLD AUTO: 48.7 % (ref 40–53)
HGB BLD-MCNC: 16.3 G/DL (ref 13.3–17.7)
LIPASE SERPL-CCNC: 118 U/L (ref 73–393)
MCH RBC QN AUTO: 28.6 PG (ref 26.5–33)
MCHC RBC AUTO-ENTMCNC: 33.5 G/DL (ref 31.5–36.5)
MCV RBC AUTO: 85 FL (ref 78–100)
PLATELET # BLD AUTO: 191 10E9/L (ref 150–450)
RBC # BLD AUTO: 5.7 10E12/L (ref 4.4–5.9)
WBC # BLD AUTO: 6 10E9/L (ref 4–11)

## 2021-07-07 PROCEDURE — 90715 TDAP VACCINE 7 YRS/> IM: CPT | Performed by: NURSE PRACTITIONER

## 2021-07-07 PROCEDURE — 86803 HEPATITIS C AB TEST: CPT | Performed by: NURSE PRACTITIONER

## 2021-07-07 PROCEDURE — 83690 ASSAY OF LIPASE: CPT | Performed by: NURSE PRACTITIONER

## 2021-07-07 PROCEDURE — 99386 PREV VISIT NEW AGE 40-64: CPT | Mod: 25 | Performed by: NURSE PRACTITIONER

## 2021-07-07 PROCEDURE — 99213 OFFICE O/P EST LOW 20 MIN: CPT | Mod: 25 | Performed by: NURSE PRACTITIONER

## 2021-07-07 PROCEDURE — 80061 LIPID PANEL: CPT | Performed by: NURSE PRACTITIONER

## 2021-07-07 PROCEDURE — 90471 IMMUNIZATION ADMIN: CPT | Performed by: NURSE PRACTITIONER

## 2021-07-07 PROCEDURE — 80053 COMPREHEN METABOLIC PANEL: CPT | Performed by: NURSE PRACTITIONER

## 2021-07-07 PROCEDURE — 36415 COLL VENOUS BLD VENIPUNCTURE: CPT | Performed by: NURSE PRACTITIONER

## 2021-07-07 PROCEDURE — 85027 COMPLETE CBC AUTOMATED: CPT | Performed by: NURSE PRACTITIONER

## 2021-07-07 RX ORDER — ATORVASTATIN CALCIUM 20 MG/1
20 TABLET, FILM COATED ORAL DAILY
Qty: 90 TABLET | Refills: 1 | Status: ON HOLD | OUTPATIENT
Start: 2021-07-07 | End: 2021-07-09

## 2021-07-07 RX ORDER — EPINEPHRINE 0.3 MG/.3ML
0.3 INJECTION SUBCUTANEOUS PRN
Qty: 2 EACH | Refills: 1 | Status: SHIPPED | OUTPATIENT
Start: 2021-07-07

## 2021-07-07 RX ORDER — SUCRALFATE 1 G/1
1 TABLET ORAL 4 TIMES DAILY PRN
Qty: 60 TABLET | Refills: 1 | Status: SHIPPED | OUTPATIENT
Start: 2021-07-07 | End: 2021-07-14

## 2021-07-07 RX ORDER — OMEPRAZOLE 40 MG/1
40 CAPSULE, DELAYED RELEASE ORAL DAILY
Qty: 30 CAPSULE | Refills: 2 | Status: SHIPPED | OUTPATIENT
Start: 2021-07-07 | End: 2021-07-14

## 2021-07-07 ASSESSMENT — ENCOUNTER SYMPTOMS
HEMATURIA: 0
NAUSEA: 0
ABDOMINAL PAIN: 1
VOMITING: 0
MYALGIAS: 1
HEADACHES: 0
BELCHING: 0
CONSTIPATION: 1
ARTHRALGIAS: 1
FREQUENCY: 0
ANOREXIA: 0
DYSURIA: 0
WEIGHT LOSS: 0
FEVER: 0
HEMATOCHEZIA: 0
DIARRHEA: 0
FLATUS: 0

## 2021-07-07 ASSESSMENT — MIFFLIN-ST. JEOR: SCORE: 2415.39

## 2021-07-07 NOTE — RESULT ENCOUNTER NOTE
Dear Jace,     -Normal red blood cell (hgb) levels, normal white blood cell count and normal platelet levels.    Additionally, I wanted to let you know that I sent in a prescription for Aspirin 81 mg daily (this was previously recommended by cardiology) for your history of non-occlusive coronary artery disease.        Please send a [facility] message or call [phone]  if you have any questions.      Deb Peoples, LEONEL, CNP  [facility] [place]

## 2021-07-08 ENCOUNTER — HOSPITAL ENCOUNTER (INPATIENT)
Facility: CLINIC | Age: 41
LOS: 1 days | Discharge: HOME | End: 2021-07-09
Attending: EMERGENCY MEDICINE | Admitting: INTERNAL MEDICINE
Payer: COMMERCIAL

## 2021-07-08 ENCOUNTER — APPOINTMENT (OUTPATIENT)
Dept: CT IMAGING | Facility: CLINIC | Age: 41
End: 2021-07-08
Attending: EMERGENCY MEDICINE
Payer: COMMERCIAL

## 2021-07-08 DIAGNOSIS — I25.118 CORONARY ARTERY DISEASE OF NATIVE ARTERY OF NATIVE HEART WITH STABLE ANGINA PECTORIS (H): ICD-10-CM

## 2021-07-08 DIAGNOSIS — M79.672 BILATERAL FOOT PAIN: Primary | ICD-10-CM

## 2021-07-08 DIAGNOSIS — I81 PORTAL VEIN THROMBOSIS: ICD-10-CM

## 2021-07-08 DIAGNOSIS — I10 BENIGN ESSENTIAL HYPERTENSION: Primary | ICD-10-CM

## 2021-07-08 DIAGNOSIS — I20.89 STABLE ANGINA PECTORIS (H): ICD-10-CM

## 2021-07-08 DIAGNOSIS — I26.02 ACUTE SADDLE PULMONARY EMBOLISM WITH ACUTE COR PULMONALE (H): ICD-10-CM

## 2021-07-08 DIAGNOSIS — R10.13 ABDOMINAL PAIN, EPIGASTRIC: ICD-10-CM

## 2021-07-08 DIAGNOSIS — Z11.52 ENCOUNTER FOR SCREENING LABORATORY TESTING FOR SEVERE ACUTE RESPIRATORY SYNDROME CORONAVIRUS 2 (SARS-COV-2): ICD-10-CM

## 2021-07-08 DIAGNOSIS — M79.671 BILATERAL FOOT PAIN: Primary | ICD-10-CM

## 2021-07-08 LAB
ALBUMIN SERPL-MCNC: 3.4 G/DL (ref 3.4–5)
ALBUMIN SERPL-MCNC: 3.9 G/DL (ref 3.4–5)
ALBUMIN UR-MCNC: NEGATIVE MG/DL
ALP SERPL-CCNC: 59 U/L (ref 40–150)
ALP SERPL-CCNC: 66 U/L (ref 40–150)
ALT SERPL W P-5'-P-CCNC: 65 U/L (ref 0–70)
ALT SERPL W P-5'-P-CCNC: 69 U/L (ref 0–70)
ANION GAP SERPL CALCULATED.3IONS-SCNC: 3 MMOL/L (ref 3–14)
ANION GAP SERPL CALCULATED.3IONS-SCNC: 7 MMOL/L (ref 3–14)
APPEARANCE UR: CLEAR
AST SERPL W P-5'-P-CCNC: 34 U/L (ref 0–45)
AST SERPL W P-5'-P-CCNC: 40 U/L (ref 0–45)
BASOPHILS # BLD AUTO: 0 10E9/L (ref 0–0.2)
BASOPHILS NFR BLD AUTO: 0.3 %
BILIRUB SERPL-MCNC: 0.8 MG/DL (ref 0.2–1.3)
BILIRUB SERPL-MCNC: 0.8 MG/DL (ref 0.2–1.3)
BILIRUB UR QL STRIP: NEGATIVE
BUN SERPL-MCNC: 14 MG/DL (ref 7–30)
BUN SERPL-MCNC: 16 MG/DL (ref 7–30)
CALCIUM SERPL-MCNC: 10.2 MG/DL (ref 8.5–10.1)
CALCIUM SERPL-MCNC: 8.7 MG/DL (ref 8.5–10.1)
CHLORIDE SERPL-SCNC: 105 MMOL/L (ref 94–109)
CHLORIDE SERPL-SCNC: 108 MMOL/L (ref 94–109)
CHOLEST SERPL-MCNC: 176 MG/DL
CO2 SERPL-SCNC: 26 MMOL/L (ref 20–32)
CO2 SERPL-SCNC: 27 MMOL/L (ref 20–32)
COLOR UR AUTO: ABNORMAL
CREAT BLD-MCNC: 1.1 MG/DL (ref 0.66–1.25)
CREAT SERPL-MCNC: 1.02 MG/DL (ref 0.66–1.25)
CREAT SERPL-MCNC: 1.17 MG/DL (ref 0.66–1.25)
DIFFERENTIAL METHOD BLD: NORMAL
EOSINOPHIL # BLD AUTO: 0.2 10E9/L (ref 0–0.7)
EOSINOPHIL NFR BLD AUTO: 2.5 %
ERYTHROCYTE [DISTWIDTH] IN BLOOD BY AUTOMATED COUNT: 13.5 % (ref 10–15)
GFR SERPL CREATININE-BSD FRML MDRD: 74 ML/MIN/{1.73_M2}
GFR SERPL CREATININE-BSD FRML MDRD: 77 ML/MIN/{1.73_M2}
GFR SERPL CREATININE-BSD FRML MDRD: >90 ML/MIN/{1.73_M2}
GLUCOSE SERPL-MCNC: 83 MG/DL (ref 70–99)
GLUCOSE SERPL-MCNC: 95 MG/DL (ref 70–99)
GLUCOSE UR STRIP-MCNC: NEGATIVE MG/DL
HCT VFR BLD AUTO: 49.4 % (ref 40–53)
HCV AB SERPL QL IA: NONREACTIVE
HDLC SERPL-MCNC: 45 MG/DL
HGB BLD-MCNC: 16.6 G/DL (ref 13.3–17.7)
HGB UR QL STRIP: NEGATIVE
IMM GRANULOCYTES # BLD: 0 10E9/L (ref 0–0.4)
IMM GRANULOCYTES NFR BLD: 0.3 %
KETONES UR STRIP-MCNC: NEGATIVE MG/DL
LABORATORY COMMENT REPORT: NORMAL
LDLC SERPL CALC-MCNC: 110 MG/DL
LEUKOCYTE ESTERASE UR QL STRIP: NEGATIVE
LIPASE SERPL-CCNC: 80 U/L (ref 73–393)
LYMPHOCYTES # BLD AUTO: 1.6 10E9/L (ref 0.8–5.3)
LYMPHOCYTES NFR BLD AUTO: 22.3 %
MCH RBC QN AUTO: 29.2 PG (ref 26.5–33)
MCHC RBC AUTO-ENTMCNC: 33.6 G/DL (ref 31.5–36.5)
MCV RBC AUTO: 87 FL (ref 78–100)
MONOCYTES # BLD AUTO: 0.7 10E9/L (ref 0–1.3)
MONOCYTES NFR BLD AUTO: 9 %
MUCOUS THREADS #/AREA URNS LPF: PRESENT /LPF
NEUTROPHILS # BLD AUTO: 4.7 10E9/L (ref 1.6–8.3)
NEUTROPHILS NFR BLD AUTO: 65.6 %
NITRATE UR QL: NEGATIVE
NONHDLC SERPL-MCNC: 131 MG/DL
NRBC # BLD AUTO: 0 10*3/UL
NRBC BLD AUTO-RTO: 0 /100
PH UR STRIP: 6.5 PH (ref 5–7)
PLATELET # BLD AUTO: 194 10E9/L (ref 150–450)
POTASSIUM SERPL-SCNC: 4.3 MMOL/L (ref 3.4–5.3)
POTASSIUM SERPL-SCNC: 5.1 MMOL/L (ref 3.4–5.3)
PROT SERPL-MCNC: 7.4 G/DL (ref 6.8–8.8)
PROT SERPL-MCNC: 7.6 G/DL (ref 6.8–8.8)
RADIOLOGIST FLAGS: ABNORMAL
RBC # BLD AUTO: 5.69 10E12/L (ref 4.4–5.9)
RBC #/AREA URNS AUTO: 1 /HPF (ref 0–2)
SARS-COV-2 RNA RESP QL NAA+PROBE: NEGATIVE
SODIUM SERPL-SCNC: 136 MMOL/L (ref 133–144)
SODIUM SERPL-SCNC: 139 MMOL/L (ref 133–144)
SOURCE: ABNORMAL
SP GR UR STRIP: 1.02 (ref 1–1.03)
SPECIMEN SOURCE: NORMAL
SQUAMOUS #/AREA URNS AUTO: 0 /HPF (ref 0–1)
TRIGL SERPL-MCNC: 106 MG/DL
TROPONIN I SERPL-MCNC: <0.015 UG/L (ref 0–0.04)
TROPONIN I SERPL-MCNC: <0.015 UG/L (ref 0–0.04)
UFH PPP CHRO-ACNC: 1.01 IU/ML
UROBILINOGEN UR STRIP-MCNC: NORMAL MG/DL (ref 0–2)
WBC # BLD AUTO: 7.2 10E9/L (ref 4–11)
WBC #/AREA URNS AUTO: 1 /HPF (ref 0–5)

## 2021-07-08 PROCEDURE — 250N000009 HC RX 250: Performed by: EMERGENCY MEDICINE

## 2021-07-08 PROCEDURE — 84484 ASSAY OF TROPONIN QUANT: CPT | Performed by: NURSE PRACTITIONER

## 2021-07-08 PROCEDURE — 36415 COLL VENOUS BLD VENIPUNCTURE: CPT | Performed by: NURSE PRACTITIONER

## 2021-07-08 PROCEDURE — 93005 ELECTROCARDIOGRAM TRACING: CPT

## 2021-07-08 PROCEDURE — 85025 COMPLETE CBC W/AUTO DIFF WBC: CPT | Performed by: EMERGENCY MEDICINE

## 2021-07-08 PROCEDURE — 96376 TX/PRO/DX INJ SAME DRUG ADON: CPT | Performed by: EMERGENCY MEDICINE

## 2021-07-08 PROCEDURE — C9803 HOPD COVID-19 SPEC COLLECT: HCPCS | Performed by: EMERGENCY MEDICINE

## 2021-07-08 PROCEDURE — 258N000003 HC RX IP 258 OP 636: Performed by: EMERGENCY MEDICINE

## 2021-07-08 PROCEDURE — 74177 CT ABD & PELVIS W/CONTRAST: CPT

## 2021-07-08 PROCEDURE — 250N000013 HC RX MED GY IP 250 OP 250 PS 637: Performed by: EMERGENCY MEDICINE

## 2021-07-08 PROCEDURE — 96361 HYDRATE IV INFUSION ADD-ON: CPT | Performed by: EMERGENCY MEDICINE

## 2021-07-08 PROCEDURE — 80053 COMPREHEN METABOLIC PANEL: CPT | Performed by: EMERGENCY MEDICINE

## 2021-07-08 PROCEDURE — 84484 ASSAY OF TROPONIN QUANT: CPT | Performed by: EMERGENCY MEDICINE

## 2021-07-08 PROCEDURE — 96366 THER/PROPH/DIAG IV INF ADDON: CPT | Performed by: EMERGENCY MEDICINE

## 2021-07-08 PROCEDURE — 99207 PR APP CREDIT; MD BILLING SHARED VISIT: CPT | Performed by: NURSE PRACTITIONER

## 2021-07-08 PROCEDURE — 82565 ASSAY OF CREATININE: CPT

## 2021-07-08 PROCEDURE — 83690 ASSAY OF LIPASE: CPT | Performed by: EMERGENCY MEDICINE

## 2021-07-08 PROCEDURE — 250N000013 HC RX MED GY IP 250 OP 250 PS 637: Performed by: NURSE PRACTITIONER

## 2021-07-08 PROCEDURE — U0005 INFEC AGEN DETEC AMPLI PROBE: HCPCS | Performed by: EMERGENCY MEDICINE

## 2021-07-08 PROCEDURE — 250N000011 HC RX IP 250 OP 636: Performed by: NURSE PRACTITIONER

## 2021-07-08 PROCEDURE — 96365 THER/PROPH/DIAG IV INF INIT: CPT | Mod: 59 | Performed by: EMERGENCY MEDICINE

## 2021-07-08 PROCEDURE — 74177 CT ABD & PELVIS W/CONTRAST: CPT | Mod: 26 | Performed by: RADIOLOGY

## 2021-07-08 PROCEDURE — U0003 INFECTIOUS AGENT DETECTION BY NUCLEIC ACID (DNA OR RNA); SEVERE ACUTE RESPIRATORY SYNDROME CORONAVIRUS 2 (SARS-COV-2) (CORONAVIRUS DISEASE [COVID-19]), AMPLIFIED PROBE TECHNIQUE, MAKING USE OF HIGH THROUGHPUT TECHNOLOGIES AS DESCRIBED BY CMS-2020-01-R: HCPCS | Performed by: EMERGENCY MEDICINE

## 2021-07-08 PROCEDURE — 99285 EMERGENCY DEPT VISIT HI MDM: CPT | Performed by: EMERGENCY MEDICINE

## 2021-07-08 PROCEDURE — 250N000011 HC RX IP 250 OP 636: Performed by: EMERGENCY MEDICINE

## 2021-07-08 PROCEDURE — 99285 EMERGENCY DEPT VISIT HI MDM: CPT | Mod: 25 | Performed by: EMERGENCY MEDICINE

## 2021-07-08 PROCEDURE — 99223 1ST HOSP IP/OBS HIGH 75: CPT | Mod: AI | Performed by: INTERNAL MEDICINE

## 2021-07-08 PROCEDURE — 85520 HEPARIN ASSAY: CPT | Performed by: EMERGENCY MEDICINE

## 2021-07-08 PROCEDURE — 250N000013 HC RX MED GY IP 250 OP 250 PS 637: Performed by: PHYSICIAN ASSISTANT

## 2021-07-08 PROCEDURE — 36415 COLL VENOUS BLD VENIPUNCTURE: CPT | Performed by: EMERGENCY MEDICINE

## 2021-07-08 PROCEDURE — 81001 URINALYSIS AUTO W/SCOPE: CPT | Performed by: EMERGENCY MEDICINE

## 2021-07-08 PROCEDURE — 120N000011 HC R&B TRANSPLANT UMMC

## 2021-07-08 PROCEDURE — 93010 ELECTROCARDIOGRAM REPORT: CPT | Performed by: INTERNAL MEDICINE

## 2021-07-08 RX ORDER — SIMETHICONE 80 MG
80 TABLET,CHEWABLE ORAL EVERY 6 HOURS PRN
Status: DISCONTINUED | OUTPATIENT
Start: 2021-07-08 | End: 2021-07-09 | Stop reason: HOSPADM

## 2021-07-08 RX ORDER — HEPARIN SODIUM 10000 [USP'U]/100ML
0-5000 INJECTION, SOLUTION INTRAVENOUS CONTINUOUS
Status: DISCONTINUED | OUTPATIENT
Start: 2021-07-08 | End: 2021-07-09

## 2021-07-08 RX ORDER — ACETAMINOPHEN 325 MG/1
325 TABLET ORAL ONCE
Status: COMPLETED | OUTPATIENT
Start: 2021-07-08 | End: 2021-07-08

## 2021-07-08 RX ORDER — LIDOCAINE 40 MG/G
CREAM TOPICAL
Status: DISCONTINUED | OUTPATIENT
Start: 2021-07-08 | End: 2021-07-09 | Stop reason: HOSPADM

## 2021-07-08 RX ORDER — LIDOCAINE 4 G/G
1 PATCH TOPICAL
Status: DISCONTINUED | OUTPATIENT
Start: 2021-07-08 | End: 2021-07-09 | Stop reason: HOSPADM

## 2021-07-08 RX ORDER — ONDANSETRON 4 MG/1
4 TABLET, ORALLY DISINTEGRATING ORAL EVERY 6 HOURS PRN
Status: DISCONTINUED | OUTPATIENT
Start: 2021-07-08 | End: 2021-07-09 | Stop reason: HOSPADM

## 2021-07-08 RX ORDER — IOPAMIDOL 755 MG/ML
135 INJECTION, SOLUTION INTRAVASCULAR ONCE
Status: COMPLETED | OUTPATIENT
Start: 2021-07-08 | End: 2021-07-08

## 2021-07-08 RX ORDER — SUCRALFATE 1 G/1
1 TABLET ORAL 4 TIMES DAILY PRN
Status: DISCONTINUED | OUTPATIENT
Start: 2021-07-08 | End: 2021-07-09 | Stop reason: HOSPADM

## 2021-07-08 RX ORDER — ASPIRIN 81 MG/1
81 TABLET ORAL DAILY
Status: DISCONTINUED | OUTPATIENT
Start: 2021-07-08 | End: 2021-07-09 | Stop reason: HOSPADM

## 2021-07-08 RX ORDER — ATORVASTATIN CALCIUM 20 MG/1
20 TABLET, FILM COATED ORAL DAILY
Status: DISCONTINUED | OUTPATIENT
Start: 2021-07-08 | End: 2021-07-09 | Stop reason: HOSPADM

## 2021-07-08 RX ORDER — POLYETHYLENE GLYCOL 3350 17 G/17G
17 POWDER, FOR SOLUTION ORAL DAILY PRN
Status: DISCONTINUED | OUTPATIENT
Start: 2021-07-08 | End: 2021-07-09 | Stop reason: HOSPADM

## 2021-07-08 RX ORDER — ONDANSETRON 2 MG/ML
4 INJECTION INTRAMUSCULAR; INTRAVENOUS EVERY 6 HOURS PRN
Status: DISCONTINUED | OUTPATIENT
Start: 2021-07-08 | End: 2021-07-09 | Stop reason: HOSPADM

## 2021-07-08 RX ORDER — AMOXICILLIN 250 MG
1 CAPSULE ORAL 2 TIMES DAILY
Status: DISCONTINUED | OUTPATIENT
Start: 2021-07-08 | End: 2021-07-09 | Stop reason: HOSPADM

## 2021-07-08 RX ORDER — AMOXICILLIN 250 MG
2 CAPSULE ORAL 2 TIMES DAILY
Status: DISCONTINUED | OUTPATIENT
Start: 2021-07-08 | End: 2021-07-09 | Stop reason: HOSPADM

## 2021-07-08 RX ADMIN — DOCUSATE SODIUM 50 MG AND SENNOSIDES 8.6 MG 2 TABLET: 8.6; 5 TABLET, FILM COATED ORAL at 19:49

## 2021-07-08 RX ADMIN — HEPARIN SODIUM 1800 UNITS/HR: 10000 INJECTION, SOLUTION INTRAVENOUS at 11:50

## 2021-07-08 RX ADMIN — ASPIRIN 81 MG: 81 TABLET, DELAYED RELEASE ORAL at 19:50

## 2021-07-08 RX ADMIN — LIDOCAINE 1 PATCH: 246 PATCH TOPICAL at 23:25

## 2021-07-08 RX ADMIN — OMEPRAZOLE 40 MG: 20 CAPSULE, DELAYED RELEASE ORAL at 19:50

## 2021-07-08 RX ADMIN — SODIUM CHLORIDE 1000 ML: 9 INJECTION, SOLUTION INTRAVENOUS at 09:40

## 2021-07-08 RX ADMIN — ATORVASTATIN CALCIUM 20 MG: 20 TABLET, FILM COATED ORAL at 19:50

## 2021-07-08 RX ADMIN — ACETAMINOPHEN 325 MG: 325 TABLET, FILM COATED ORAL at 23:25

## 2021-07-08 RX ADMIN — IOPAMIDOL 135 ML: 755 INJECTION, SOLUTION INTRAVENOUS at 10:10

## 2021-07-08 RX ADMIN — LIDOCAINE HYDROCHLORIDE 30 ML: 20 SOLUTION ORAL; TOPICAL at 09:34

## 2021-07-08 RX ADMIN — HEPARIN SODIUM 1600 UNITS/HR: 10000 INJECTION, SOLUTION INTRAVENOUS at 22:36

## 2021-07-08 ASSESSMENT — ENCOUNTER SYMPTOMS
COLOR CHANGE: 0
CHILLS: 0
NAUSEA: 0
BRUISES/BLEEDS EASILY: 0
FEVER: 0
NECK STIFFNESS: 0
EYE REDNESS: 0
ADENOPATHY: 0
VOMITING: 0
CONFUSION: 0
SHORTNESS OF BREATH: 0
POLYDIPSIA: 0
BLOOD IN STOOL: 0
DIFFICULTY URINATING: 0
NECK PAIN: 0
ARTHRALGIAS: 0
DIARRHEA: 0
ABDOMINAL PAIN: 1
HEADACHES: 0
CONSTIPATION: 1

## 2021-07-08 ASSESSMENT — ACTIVITIES OF DAILY LIVING (ADL)
ADLS_ACUITY_SCORE: 13
ADLS_ACUITY_SCORE: 13

## 2021-07-08 ASSESSMENT — MIFFLIN-ST. JEOR
SCORE: 2415.39
SCORE: 2400.42

## 2021-07-08 NOTE — PROGRESS NOTES
"BP (!) 145/88 (BP Location: Right arm)   Pulse 65   Temp 98.2  F (36.8  C) (Oral)   Resp 16   Ht 1.956 m (6' 5\")   Wt 137.3 kg (302 lb 11.2 oz)   SpO2 93%   BMI 35.89 kg/m        Patient Active Problem List   Diagnosis     Stable angina pectoris (H)     Elevated coronary artery calcium score     History of pulmonary embolism     Hx of major depression     Ascending aortic aneurysm (H)     Varicose veins of right lower extremity with edema     Chest pain, unspecified type     Portal vein thrombosis     Abdominal pain, epigastric         Pt aox-4, denies pain, vss, no sob present. Two nurse skin assessment done with Mari GARCES no significant findings. Pt heparin paused at 1846 then oncoming nurse to decrease by 200units. No further acute changes will continue with POC.   "

## 2021-07-08 NOTE — PHARMACY-ADMISSION MEDICATION HISTORY
Admission Medication History Completed by Pharmacy    See [facility] Admission Navigator for allergy information, preferred outpatient pharmacy, prior to admission medications and immunization status.     Medication History Sources:     Patient, chart review    Changes made to PTA medication list (reason):    Added: None    Deleted: None    Changed: None    Additional Information:    Verified patient has not had a recent dose of rivaroxaban. Patient stopped about 3 months ago. Pt was advised to restart rivaroxaban yesterday 7/7/21 but had not taken a dose yet.     Prior to Admission medications    Medication Sig Last Dose Taking? Auth Provider   aspirin (ASA) 81 MG EC tablet Take 1 tablet (81 mg) by mouth daily More than a month at Unknown time  Deb Peoples APRN CNP   atorvastatin (LIPITOR) 20 MG tablet Take 1 tablet (20 mg) by mouth daily More than a month at Unknown time  Deb Peoples APRN CNP   EPINEPHrine (ANY BX GENERIC EQUIV) 0.3 MG/0.3ML injection 2-pack Inject 0.3 mLs (0.3 mg) into the muscle as needed for anaphylaxis More than a month at Unknown time  Deb Peoples APRN CNP   omeprazole (PRILOSEC) 40 MG DR capsule Take 1 capsule (40 mg) by mouth daily More than a month at Unknown time  Deb Peoples APRN CNP   rivaroxaban ANTICOAGULANT (XARELTO) 20 MG TABS tablet Take 1 tablet (20 mg) by mouth daily (with dinner) More than a month at Unknown time  Deb Peoples APRN CNP   sucralfate (CARAFATE) 1 GM tablet Take 1 tablet (1 g) by mouth 4 times daily as needed (epigastric pain) More than a month at Unknown time  Deb Peoples APRN CNP       Date completed: 07/08/21    Medication history completed by: Domenica Lynn [facility]

## 2021-07-08 NOTE — ED PROVIDER NOTES
ED Provider Note  [facility]      History     Chief Complaint   Patient presents with     Abdominal Pain     The history is provided by the patient and medical records.     Jace Pratt is a 40 year old male with a past medical history significant for CAD, ascending aortic aneurysm, PE on Xarelto, s/p cholecystectomy, s/p appendectomy, stable angina, muscular dystrophy who presents to the Emergency Department for evaluation of abdominal pain.  Patient states that he has left lower quadrant and epigastric abdominal pain.  He reports that the left lower quadrant pain has been there for a while, but that the epigastric pain started 4 days ago.  Patient reports that his pain is sharp, constant, moderate, and it has begun to radiate into his back.  He states that his pain worsens with deep breaths.  He denies any difficulty breathing, shortness of breath, or chest pain.  Patient denies any recent abdominal surgery.  Patient reports that he went to his PCP yesterday and was prescribed pain medications, but that he was not able to pick them up until this morning.  Patient states that he was diagnosed with a PE in 2/2019, and was started on Xarelto.  Patient reports that 3 months ago he stopped taking his Xarelto because he kept forgetting about it.  At his PCP appointment yesterday he was recommended to start taking Xarelto again. Patient denies vomiting, nausea, fevers.  Patient states that his last BM was 2 days ago and that this is not normal for him to skip days between BMs.  Patient reports he has had no gas. Of note, patient states that in 12/2020 he was diagnosed with an ascending aortic aneurysm.  He reports that he had an angiogram done and they found a 30% blockage.  He reports that they did not repair it.    Past Medical History  Past Medical History:   Diagnosis Date     Acute saddle pulmonary embolism with acute cor pulmonale (H)      Anxiety      Ascending aortic aneurysm (H)       CAD (coronary artery disease) 12/01/2020    Mild nonobstructive     Chest pain      Depression      ANTON (dyspnea on exertion)      Former smoker      Neuromuscular disorder (H)      S/P coronary angiogram 12/01/2020    No intervention     Stable angina (H)      Varicose veins of right lower extremity with edema      Past Surgical History:   Procedure Laterality Date     ANKLE SURGERY Right 2019    CMT     APPENDECTOMY  2007     CHOLECYSTECTOMY  2008     CV HEART CATHETERIZATION WITH POSSIBLE INTERVENTION N/A 12/1/2020    Procedure: Heart Catheterization with Possible Intervention;  Surgeon: Nurys Lopez MD;  Location:  HEART CARDIAC CATH LAB     CV LEFT HEART CATH N/A 12/1/2020    Procedure: Left Heart Cath;  Surgeon: Nurys Lopez MD;  Location:  HEART CARDIAC CATH LAB     No current outpatient medications on file.    Allergies   Allergen Reactions     Bee Pollen Anaphylaxis     Other reaction(s): Unknown Reaction     Cefazolin Hives     Other reaction(s): Hives     Azithromycin Nausea and Vomiting     Latex Hives     Morphine Hives     Penicillins Nausea and Vomiting     Bupropion Other (See Comments)     Made him feel suicidal  Made him feel suicidal       Past medical history, past surgical history, medications, and allergies were reviewed with the patient. Additional pertinent items: muscular dystrophy    Family History  Family History   Problem Relation Age of Onset     Depression Mother      Depression Father      Clotting Disorder Maternal Grandmother      Depression Brother      Family history was reviewed with the patient. Additional pertinent items: None    Social History  Social History     Tobacco Use     Smoking status: Former Smoker     Types: Cigarettes     Smokeless tobacco: Former User     Quit date: 2019   Substance Use Topics     Alcohol use: Yes     Frequency: 2-4 times a month     Drinks per session: 1 or 2     Comment: A glass a wine a week      Drug use: Never      Social  "history was reviewed with the patient. Additional pertinent items: None      Review of Systems   Constitutional: Negative for chills and fever.   HENT: Negative for congestion.    Eyes: Negative for redness.   Respiratory: Negative for shortness of breath.    Cardiovascular: Negative for chest pain.   Gastrointestinal: Positive for abdominal pain and constipation. Negative for blood in stool, diarrhea, nausea and vomiting.   Endocrine: Negative for polydipsia and polyuria.   Genitourinary: Negative for difficulty urinating.   Musculoskeletal: Negative for arthralgias, neck pain and neck stiffness.   Skin: Negative for color change.   Neurological: Negative for headaches.   Hematological: Negative for adenopathy. Does not bruise/bleed easily.   Psychiatric/Behavioral: Negative for confusion.   All other systems reviewed and are negative.    A complete review of systems was performed with pertinent positives and negatives noted in the HPI, and all other systems negative.    Physical Exam   BP: (!) 142/90  Pulse: 74  Temp: 97.6  F (36.4  C)  Resp: 16  Height: 195.6 cm (6' 5\")  Weight: 138.8 kg (306 lb)  SpO2: 100 %  Physical Exam  Vitals signs and nursing note reviewed.   Constitutional:       General: He is not in acute distress.     Appearance: Normal appearance. He is not diaphoretic.   HENT:      Head: Normocephalic and atraumatic.   Eyes:      General: No scleral icterus.     Extraocular Movements: Extraocular movements intact.      Conjunctiva/sclera: Conjunctivae normal.   Neck:      Musculoskeletal: Normal range of motion and neck supple.   Cardiovascular:      Rate and Rhythm: Normal rate.      Pulses: Normal pulses.      Heart sounds: Normal heart sounds.   Pulmonary:      Effort: Pulmonary effort is normal. No respiratory distress.      Breath sounds: Normal breath sounds. No wheezing or rhonchi.   Abdominal:      General: There is no distension.      Palpations: Abdomen is soft.      Tenderness: There is " abdominal tenderness in the epigastric area and left lower quadrant. There is no right CVA tenderness, left CVA tenderness, guarding or rebound. Negative signs include Green's sign and McBurney's sign.   Musculoskeletal: Normal range of motion.         General: No tenderness.   Skin:     General: Skin is warm.      Capillary Refill: Capillary refill takes less than 2 seconds.      Findings: No rash.   Neurological:      General: No focal deficit present.      Mental Status: He is alert and oriented to person, place, and time.      Cranial Nerves: No cranial nerve deficit.      Coordination: Coordination normal.   Psychiatric:         Mood and Affect: Mood normal.         Behavior: Behavior normal.         Thought Content: Thought content normal.         Judgment: Judgment normal.         ED Course     9:18 AM  The patient was seen and examined by Autumn Dean MD in Room ED09.   Procedures             Results for orders placed or performed during the hospital encounter of 07/08/21   CT Abdomen Pelvis w Contrast     Status: Abnormal   Result Value Ref Range    Radiologist flags Left portal vein thrombosis (Urgent)     Narrative    EXAMINATION: CT ABDOMEN PELVIS W CONTRAST, 7/8/2021 10:18 AM    TECHNIQUE:  Helical CT images from the lung bases through the  symphysis pubis were obtained with IV contrast. Contrast dose:    iopamidol (ISOVUE-370) solution 135 mL     COMPARISON: None    HISTORY: Diverticulitis suspected. Epigastric and left lower quadrant  pain.    FINDINGS:    Abdomen and pelvis: There is thrombosis of the left portal vein.  Diffuse hypoattenuation of the hepatic parenchyma. No focal hepatic  lesion. Cholecystectomy. No biliary dilation. Pancreas, spleen,  adrenal glands are normal. 2 mm nonobstructing stone in the left  kidney interpolar collecting system. Right kidney is normal. Ureters  and urinary bladder are normal. Colonic diverticulosis without acute  inflammation. No dilated bowel.  Appendectomy. No free air. No  lymphadenopathy. Major vessels are unremarkable. Changes in the  inguinal canals bilaterally suggestive of prior hernia repairs.    Lower thorax:  Mild dependent atelectasis in the posterior lung bases.    Bones and soft tissues: No acute or suspicious osseous lesion. Small  fat-containing umbilical hernia. Fat-containing Spigellian type hernia  on the right with fat in the abdominal wall itself.      Impression    IMPRESSION:   1. Acute appearing thrombosis of the left portal vein.  2. Hepatic steatosis.  3. Fat-containing right Spigellian hernia.    [Urgent Result: Left portal vein thrombosis]    Finding was identified on 7/8/2021 10:43 AM.     Dr. Dean was contacted by Dr. Tavares at 7/8/2021 11:20 AM and  verbalized understanding of the urgent finding.      I have personally reviewed the examination and initial interpretation  and I agree with the findings.    ARTI CONWAY MD         SYSTEM ID:  [id number]   Comprehensive metabolic panel     Status: None   Result Value Ref Range    Sodium 136 133 - 144 mmol/L    Potassium 5.1 3.4 - 5.3 mmol/L    Chloride 108 94 - 109 mmol/L    Carbon Dioxide 26 20 - 32 mmol/L    Anion Gap 3 3 - 14 mmol/L    Glucose 95 70 - 99 mg/dL    Urea Nitrogen 14 7 - 30 mg/dL    Creatinine 1.02 0.66 - 1.25 mg/dL    GFR Estimate >90 >60 mL/min/[1.73_m2]    GFR Estimate If Black >90 >60 mL/min/[1.73_m2]    Calcium 8.7 8.5 - 10.1 mg/dL    Bilirubin Total 0.8 0.2 - 1.3 mg/dL    Albumin 3.4 3.4 - 5.0 g/dL    Protein Total 7.4 6.8 - 8.8 g/dL    Alkaline Phosphatase 59 40 - 150 U/L    ALT 65 0 - 70 U/L    AST 40 0 - 45 U/L   Lipase     Status: None   Result Value Ref Range    Lipase 80 73 - 393 U/L   CBC with platelets differential     Status: None   Result Value Ref Range    WBC 7.2 4.0 - 11.0 10e9/L    RBC Count 5.69 4.4 - 5.9 10e12/L    Hemoglobin 16.6 13.3 - 17.7 g/dL    Hematocrit 49.4 40.0 - 53.0 %    MCV 87 78 - 100 fl    MCH 29.2 26.5 - 33.0 pg    MCHC  33.6 31.5 - 36.5 g/dL    RDW 13.5 10.0 - 15.0 %    Platelet Count 194 150 - 450 10e9/L    Diff Method Automated Method     % Neutrophils 65.6 %    % Lymphocytes 22.3 %    % Monocytes 9.0 %    % Eosinophils 2.5 %    % Basophils 0.3 %    % Immature Granulocytes 0.3 %    Nucleated RBCs 0 0 /100    Absolute Neutrophil 4.7 1.6 - 8.3 10e9/L    Absolute Lymphocytes 1.6 0.8 - 5.3 10e9/L    Absolute Monocytes 0.7 0.0 - 1.3 10e9/L    Absolute Eosinophils 0.2 0.0 - 0.7 10e9/L    Absolute Basophils 0.0 0.0 - 0.2 10e9/L    Abs Immature Granulocytes 0.0 0 - 0.4 10e9/L    Absolute Nucleated RBC 0.0    UA with Microscopic     Status: Abnormal   Result Value Ref Range    Color Urine Light Yellow     Appearance Urine Clear     Glucose Urine Negative NEG^Negative mg/dL    Bilirubin Urine Negative NEG^Negative    Ketones Urine Negative NEG^Negative mg/dL    Specific Gravity Urine 1.023 1.003 - 1.035    Blood Urine Negative NEG^Negative    pH Urine 6.5 5.0 - 7.0 pH    Protein Albumin Urine Negative NEG^Negative mg/dL    Urobilinogen mg/dL Normal 0.0 - 2.0 mg/dL    Nitrite Urine Negative NEG^Negative    Leukocyte Esterase Urine Negative NEG^Negative    Source Midstream Urine     WBC Urine 1 0 - 5 /HPF    RBC Urine 1 0 - 2 /HPF    Squamous Epithelial /HPF Urine 0 0 - 1 /HPF    Mucous Urine Present (A) NEG^Negative /LPF   Creatinine POCT     Status: None   Result Value Ref Range    Creatinine 1.1 0.66 - 1.25 mg/dL    GFR Estimate 74 >60 mL/min/[1.73_m2]    GFR Estimate If Black 90 >60 mL/min/[1.73_m2]   Asymptomatic SARS-CoV-2 COVID-19 Virus (Coronavirus) by PCR     Status: None    Specimen: Nasopharyngeal   Result Value Ref Range    SARS-CoV-2 Virus Specimen Source Nasopharyngeal     SARS-CoV-2 PCR Result NEGATIVE     SARS-CoV-2 PCR Comment       Testing was performed using the Xpert Xpress SARS-CoV-2 Assay on the Cepheid Gene-Xpert   Instrument Systems. Additional information about this Emergency Use Authorization (EUA)   assay can be  found via the Lab Guide.     Troponin I     Status: None   Result Value Ref Range    Troponin I ES <0.015 0.000 - 0.045 ug/L   Troponin I     Status: None   Result Value Ref Range    Troponin I ES <0.015 0.000 - 0.045 ug/L   Heparin Unfractionated Anti Xa Level     Status: None   Result Value Ref Range    Heparin Unfractionated Anti Xa Level 1.01 IU/mL   EKG 12-lead, complete     Status: None (Preliminary result)   Result Value Ref Range    Interpretation ECG Click View Image link to view waveform and result      Medications   heparin 25,000 units in 0.45% NaCl 250 mL ANTICOAGULANT infusion (1,800 Units/hr Intravenous Rate/Dose Verify 7/8/21 1405)   lidocaine 1 % 0.1-1 mL (has no administration in time range)   lidocaine (LMX4) cream (has no administration in time range)   sodium chloride (PF) 0.9% PF flush 3 mL (has no administration in time range)   sodium chloride (PF) 0.9% PF flush 3 mL (has no administration in time range)   melatonin tablet 1 mg (has no administration in time range)   Patient is already receiving anticoagulation with heparin, enoxaparin (LOVENOX), warfarin (COUMADIN)  or other anticoagulant medication (has no administration in time range)   senna-docusate (SENOKOT-S/PERICOLACE) 8.6-50 MG per tablet 1 tablet (has no administration in time range)     Or   senna-docusate (SENOKOT-S/PERICOLACE) 8.6-50 MG per tablet 2 tablet (has no administration in time range)   ondansetron (ZOFRAN-ODT) ODT tab 4 mg (has no administration in time range)     Or   ondansetron (ZOFRAN) injection 4 mg (has no administration in time range)   aspirin EC tablet 81 mg (has no administration in time range)   atorvastatin (LIPITOR) tablet 20 mg (has no administration in time range)   omeprazole (priLOSEC) CR capsule 40 mg (has no administration in time range)   sucralfate (CARAFATE) tablet 1 g (has no administration in time range)   lidocaine (XYLOCAINE) 2 % 15 mL, alum & mag hydroxide-simethicone (MAALOX) 15 mL GI  Cocktail (30 mLs Oral Given 7/8/21 0934)   0.9% sodium chloride BOLUS (0 mLs Intravenous Stopped 7/8/21 1043)   sodium chloride (PF) 0.9% PF flush 84 mL (84 mLs Intravenous Given 7/8/21 1010)   iopamidol (ISOVUE-370) solution 135 mL (135 mLs Intravenous Given 7/8/21 1010)   heparin ANTICOAGULANT loading dose for  HIGH INTENSITY TREATMENT* Give BEFORE starting heparin infusion (8,000 Units Intravenous Given 7/8/21 1155)        Assessments & Plan (with Medical Decision Making)   Jace Pratt is a 40-year-old man presenting with approximately 1 week of epigastric and left lower quadrant abdominal pain.  Differential diagnosis: Acute pancreatitis, PUD, SBO, unlikely volvulus, acute diverticulitis, diverticular abscess.    After thorough history and physical exam patient appears to be in no acute distress.  He declined IV analgesia, however, he did agree to have a trial of a GI cocktail for symptomatic management.  I will obtain laboratory studies and CT abdomen/pelvis for further diagnostic evaluation.  He agrees with the plan.    Patient's laboratory studies returned with no leukocytosis, WBC is normal at 7200.  There is no anemia, hemoglobin is normal at 16.6.  Electrolytes show no evidence of dehydration, creatinine is normal at 1.02.  LFTs and lipase are normal.  Urinalysis shows no evidence of infection or hematuria.  I reviewed patient's CT abdomen/pelvis and I read the radiology report; patient has new portal venous thrombus, likely the etiology of his upper abdominal pain.  I initiated intravenous heparin infusion and he will be admitted to internal medicine service for further evaluation and management.  He agrees with the plan.     This part of the medical record was transcribed by Comfort Ardon, Medical Scribe, from a dictation done by Autumn Dean MD.     I have reviewed the nursing notes. I have reviewed the findings, diagnosis, plan and need for follow up with the patient.    Current Discharge  Medication List          Final diagnoses:   Portal vein thrombosis   Abdominal pain, epigastric     [place], am serving as a trained medical scribe to document services personally performed by Autumn Dean MD, based on the provider's statements to me.     I, Autumn Dean MD, was physically present and have reviewed and verified the accuracy of this note documented by Comfort Ardon.    --  Autumn Dean MD  [facility] EMERGENCY DEPARTMENT  7/8/2021     Autumn Dean MD  07/08/21 4721

## 2021-07-08 NOTE — ED TRIAGE NOTES
Terence comes to the ED this morning for evaluation of 4 day history of abdominal pain.  Seen in clinic yesterday by PCP and scheduled for endoscopy and colonoscopy but he is unable to tolerate the pain for 3 weeks.  He denies nausea/vomiting, fever, or dysuria.  Had had an appendectomy and cholecystecomy in the past.

## 2021-07-08 NOTE — RESULT ENCOUNTER NOTE
Dear Jace,     -LDL(bad) cholesterol level is slightly elevated which can increase your heart disease risk.  A diet high in fat and simple carbohydrates, genetics and being overweight can contribute to this. ADVISE: exercising 150 minutes of aerobic exercise per week (30 minutes for 5 days per week or 50 minutes for 3 days per week are options) and eating a low saturated fat/low carbohydrate diet are helpful to improve this. In 12 months, you should recheck your fasting cholesterol panel.  -Liver and gallbladder tests (ALT,AST, Alk phos,bilirubin) are normal.  -Kidney function (GFR) is normal.  -Sodium is normal.  -Potassium is normal.  -Calcium is elevated.  ADVISE: rechecking this in 1 month.  -Glucose (diabetic screening test) is normal.  -Hepatitis C antibody screen test shows no signs of a previous hepatitis C infection.      Please send a [employer] message or call [phone]  if you have any questions.      LEONEL Andres, CNP  [facility]    If you have further questions about the interpretation of your labs, [email or web address] is a good website to check out for further information.

## 2021-07-08 NOTE — H&P
"[facility]    History and Physical - Hospitalist Service, Gold 7       Date of Admission:  7/8/2021    Assessment & Plan    Jace Pratt is a 40 year old male admitted on 7/8/2021. He has a past medical history saddle PE after foot surgery requiring catheter-directed lytic at [facility] (3/2019) , hyperlipidemia, hypertension, non-occlusive CAD, depression, anxiety, ascending aortic aneurysm (4.2 cm), former smoker who presented to [facility] ED with severe epigastric abdominal pain (worsening over past 7 days).  CT A/P in ED demonstrated an acute portal vein thrombus for which he was started on a therapeutic heparin infusion.   Being admitted to internal medicine for further care.    #Acute PVT  #Acute abdominal pain due to above  Unclear underlying etiology.  No hx of liver failure though hx of bilateral PEs in 2019.  Was supposed to be on lifelong Xarelto per patient, has not taken in 3 months due to \"life changes\".    - Liver US with dopplers tomorrow, NPO at midnight   - Continue heparin infusion for tonight while pursuing cardiac workup  - Will likely resume Xarelto this admission   - Hematology consult, appreciate reccs     #Acute on chronic chest pain   #Hx non-obstructive CAD  Follows as OP with Dr. Barreto and most recently with Quynh Waterman PA-C 11/2020.  Family hx of sudden cardiac death and PEs/DVTs.  Underwent calcium score that had a score of 122 in RCA and 30 in LAD for total 152 (placing him in 99th percentile).  Then s/p treatmill nuclear stress test where had a inferior and inferolateral defect that was consistent with infarct and an EF of 47&.  TTE afterwards showed a normal EF but dilated IVC and borderline RV enlargement.  S/p coronary angio 12/2020 that showed just a 10% proximal and mid-LAD lesion and a 10% proximal to mid RCA lesion.  There was no intervention.  Continues to have chest pain multiple times per day, OP [roviders are working up other " sources of chest pain including GERD. Given hx, will pursue cardiac workup.   - Troponin in ED flat, repeat now   - TTE  - EKG   - Consider cards consult based on results   - Telemetry     #Hx saddle PE s/p catheter directed lytic therapy (3/2019)  Completed a 6 month course of Xarelto but was later seen in cardiology clinic who recommended lifelong anticoagulation but patient self discontinued this 3 months ago.    - On heparin as above  - If hypoxic overnight, obtain CT chest   - TTE now to rule out R heart strain     #Depression  #Anxiety  Previously managed on Buspar 150 mg but no longer taking medication.   - Should likely resume this but will discuss with patient vs PCP resuming as OP     #Ascending aortic aneurysm  Last measured at 4.2cm.    - If severe chest pain overnight, obtain chest CT with contrast     #Hypertension  #Hyperlipidemia  Supposed to be managed on Imdur 30 mg daily, Toprol-XL 50 mg, ASA 81 mg and statin daily but has not been taking this.  Bps currently 145/88.    - Consider resumption of BP meds  - Resume ASA and statin now   - Trend BPs  - Follow up lipid panel sent by PCP    #Chronic LLQ abdominal pain  Ongoing over past year.  Unclear etiology.  Possibly gastritis vs GERD.  CT A/P without cause.   - OP workup including GI referral, EGD and colonoscopy (ordered)  - Continue PPI and Carafate PRN     #Possible KELTON   Spouse notes recent snoring.    - OP sleep referral placed by PCP this week, patient to follow up      Diet: Regular Diet Adult    DVT Prophylaxis: Heparin infusion   Saunders Catheter: Not present  Central Lines: None  Code Status:  Full Code     Disposition Plan   Expected discharge: Tomorrow, recommended to prior living arrangement once adequate pain management/ tolerating PO medications and stable on PO anticoagulation.      The patient's care was discussed with the Attending Physician, Dr. Stokes, Bedside Nurse, Patient and Patient's Family.    Alexandria Guo, JEREMIAS  [facility]  [facility]  Securely message with the [employer] Web Console (learn more here)  Text page via [employer] Paging/Directory  Please see sign in/sign out for up to date coverage information    ______________________________________________________________________    Chief Complaint   Epigastric pain and chest pain     History is obtained from the patient and electronic health record    History of Present Illness   Jace Pratt is a 40 year old male who has a past medical history saddle PE after foot surgery requiring catheter-directed lytic at [facility] (3/2019) , hyperlipidemia, hypertension, non-occlusive CAD, depression, anxiety, ascending aortic aneurysm (4.2 cm), former smoker who presented to [facility] ED with severe epigastric abdominal pain.    He started noting this pain about 1 week ago.  He has somewhat chronic LLQ pain but this was different.  Pain is constant in epigastric region and will be painful with walking and shares it is hard to take a deep breath.  No worse with eating.  No N/V/D.  No melena, no constipation.  Has been off all of his medications for the past 3 months.  He also describes L shoulder and chest pain that has worsened since admission to the ED.     Review of Systems    The 5 point Review of Systems is negative other than noted in the HPI or here.     Past Medical History    I have reviewed this patient's medical history and updated it with pertinent information if needed.   Past Medical History:   Diagnosis Date     Acute saddle pulmonary embolism with acute cor pulmonale (H)      Anxiety      Ascending aortic aneurysm (H)      CAD (coronary artery disease) 12/01/2020    Mild nonobstructive     Chest pain      Depression      ANTON (dyspnea on exertion)      Former smoker      Neuromuscular disorder (H)      S/P coronary angiogram 12/01/2020    No intervention     Stable angina (H)      Varicose veins of right lower extremity with edema        Past Surgical  History   I have reviewed this patient's surgical history and updated it with pertinent information if needed.  Past Surgical History:   Procedure Laterality Date     ANKLE SURGERY Right 2019    CMT     APPENDECTOMY  2007     CHOLECYSTECTOMY  2008     CV HEART CATHETERIZATION WITH POSSIBLE INTERVENTION N/A 12/1/2020    Procedure: Heart Catheterization with Possible Intervention;  Surgeon: Nurys Lopez MD;  Location:  HEART CARDIAC CATH LAB     CV LEFT HEART CATH N/A 12/1/2020    Procedure: Left Heart Cath;  Surgeon: Nurys Lopez MD;  Location:  HEART CARDIAC CATH LAB       Social History   I have reviewed this patient's social history and updated it with pertinent information if needed.  Social History     Tobacco Use     Smoking status: Former Smoker     Types: Cigarettes     Smokeless tobacco: Former User     Quit date: 2019   Substance Use Topics     Alcohol use: Yes     Frequency: 2-4 times a month     Drinks per session: 1 or 2     Comment: A glass a wine a week      Drug use: Never       Family History   I have reviewed this patient's family history and updated it with pertinent information if needed.  Family History   Problem Relation Age of Onset     Depression Mother      Depression Father      Clotting Disorder Maternal Grandmother      Depression Brother        Prior to Admission Medications   Prior to Admission Medications   Prescriptions Last Dose Informant Patient Reported? Taking?   EPINEPHrine (ANY BX GENERIC EQUIV) 0.3 MG/0.3ML injection 2-pack More than a month at Unknown time Self No No   Sig: Inject 0.3 mLs (0.3 mg) into the muscle as needed for anaphylaxis   aspirin (ASA) 81 MG EC tablet More than a month at Unknown time Self No No   Sig: Take 1 tablet (81 mg) by mouth daily   atorvastatin (LIPITOR) 20 MG tablet More than a month at Unknown time Self No No   Sig: Take 1 tablet (20 mg) by mouth daily   omeprazole (PRILOSEC) 40 MG DR capsule More than a month at Unknown time Self No  No   Sig: Take 1 capsule (40 mg) by mouth daily   rivaroxaban ANTICOAGULANT (XARELTO) 20 MG TABS tablet More than a month at Unknown time Self No No   Sig: Take 1 tablet (20 mg) by mouth daily (with dinner)   sucralfate (CARAFATE) 1 GM tablet More than a month at Unknown time Self No No   Sig: Take 1 tablet (1 g) by mouth 4 times daily as needed (epigastric pain)      Facility-Administered Medications: None     Allergies   Allergies   Allergen Reactions     Bee Pollen Anaphylaxis     Other reaction(s): Unknown Reaction     Cefazolin Hives     Other reaction(s): Hives     Azithromycin Nausea and Vomiting     Latex Hives     Morphine Hives     Penicillins Nausea and Vomiting     Bupropion Other (See Comments)     Made him feel suicidal  Made him feel suicidal         Physical Exam   Vital Signs: Temp: 97.6  F (36.4  C) Temp src: Oral BP: 133/83 Pulse: 58   Resp: 18 SpO2: 96 % O2 Device: None (Room air)    Weight: 306 lbs 0 oz    General Appearance: NAD, sitting in bed.  Pleasant and interactive.   Eyes: PERRLA  HEENT: MMM, normocephalic, atraumatic.   Respiratory: Normal effort on RA, CTAB, no wheezes.   Cardiovascular: RRR, S1S2.  No murmurs appreciated.   GI: Abdomen soft.  TTP epigastric region and LLQ.  No rebound.   Skin: No jaundice, no open wounds or rashes.   Musculoskeletal: No joint swelling or tenderness.   Neurologic: A+O x 3, no focal deficits.   Psychiatric: Mood and affect appropriate.     Data   Data reviewed today: I reviewed all medications, new labs and imaging results over the last 24 hours.     Recent Labs   Lab 07/08/21  0922 07/07/21  0910   WBC 7.2 6.0   HGB 16.6 16.3   MCV 87 85    191    139   POTASSIUM 5.1 4.3   CHLORIDE 108 105   CO2 26 27   BUN 14 16   CR 1.02 1.17   ANIONGAP 3 7   JANIE 8.7 10.2*   GLC 95 83   ALBUMIN 3.4 3.9   PROTTOTAL 7.4 7.6   BILITOTAL 0.8 0.8   ALKPHOS 59 66   ALT 65 69   AST 40 34   LIPASE 80 118   TROPI <0.015  --      Most Recent 3 CBC's:  Recent Labs    Lab Test 07/08/21  0922 07/07/21  0910 12/01/20  0044   WBC 7.2 6.0 7.4   HGB 16.6 16.3 16.4   MCV 87 85 87    191 195     Most Recent 3 BMP's:  Recent Labs   Lab Test 07/08/21  0922 07/07/21  0910 12/01/20  0044    139 139   POTASSIUM 5.1 4.3 4.0   CHLORIDE 108 105 106   CO2 26 27 27   BUN 14 16 16   CR 1.02 1.17 1.17   ANIONGAP 3 7 6   JANIE 8.7 10.2* 8.8   GLC 95 83 110*     Most Recent 2 LFT's:  Recent Labs   Lab Test 07/08/21  0922 07/07/21  0910   AST 40 34   ALT 65 69   ALKPHOS 59 66   BILITOTAL 0.8 0.8     Most Recent 3 INR's:  Recent Labs   Lab Test 04/20/19  1203   INR 1.29*     Most Recent Urinalysis:  Recent Labs   Lab Test 07/08/21  0926   COLOR Light Yellow   APPEARANCE Clear   URINEGLC Negative   URINEBILI Negative   URINEKETONE Negative   SG 1.023   UBLD Negative   URINEPH 6.5   PROTEIN Negative   NITRITE Negative   LEUKEST Negative   RBCU 1   WBCU 1     Recent Results (from the past 24 hour(s))   CT Abdomen Pelvis w Contrast   Result Value    Radiologist flags Left portal vein thrombosis (Urgent)    Narrative    EXAMINATION: CT ABDOMEN PELVIS W CONTRAST, 7/8/2021 10:18 AM    TECHNIQUE:  Helical CT images from the lung bases through the  symphysis pubis were obtained with IV contrast. Contrast dose:    iopamidol (ISOVUE-370) solution 135 mL     COMPARISON: None    HISTORY: Diverticulitis suspected. Epigastric and left lower quadrant  pain.    FINDINGS:    Abdomen and pelvis: There is thrombosis of the left portal vein.  Diffuse hypoattenuation of the hepatic parenchyma. No focal hepatic  lesion. Cholecystectomy. No biliary dilation. Pancreas, spleen,  adrenal glands are normal. 2 mm nonobstructing stone in the left  kidney interpolar collecting system. Right kidney is normal. Ureters  and urinary bladder are normal. Colonic diverticulosis without acute  inflammation. No dilated bowel. Appendectomy. No free air. No  lymphadenopathy. Major vessels are unremarkable. Changes in the  inguinal  canals bilaterally suggestive of prior hernia repairs.    Lower thorax:  Mild dependent atelectasis in the posterior lung bases.    Bones and soft tissues: No acute or suspicious osseous lesion. Small  fat-containing umbilical hernia. Fat-containing Spigellian type hernia  on the right with fat in the abdominal wall itself.      Impression    IMPRESSION:   1. Acute appearing thrombosis of the left portal vein.  2. Hepatic steatosis.  3. Fat-containing right Spigellian hernia.    [Urgent Result: Left portal vein thrombosis]    Finding was identified on 7/8/2021 10:43 AM.     Dr. Dean was contacted by Dr. Tavares at 7/8/2021 11:20 AM and  verbalized understanding of the urgent finding.      I have personally reviewed the examination and initial interpretation  and I agree with the findings.    ARTI CONWAY MD         SYSTEM ID:  [id number]

## 2021-07-09 ENCOUNTER — APPOINTMENT (OUTPATIENT)
Dept: CARDIOLOGY | Facility: CLINIC | Age: 41
End: 2021-07-09
Attending: NURSE PRACTITIONER
Payer: COMMERCIAL

## 2021-07-09 ENCOUNTER — APPOINTMENT (OUTPATIENT)
Dept: ULTRASOUND IMAGING | Facility: CLINIC | Age: 41
End: 2021-07-09
Attending: NURSE PRACTITIONER
Payer: COMMERCIAL

## 2021-07-09 VITALS
BODY MASS INDEX: 35.74 KG/M2 | TEMPERATURE: 97.6 F | HEIGHT: 77 IN | RESPIRATION RATE: 18 BRPM | HEART RATE: 61 BPM | WEIGHT: 302.7 LBS | SYSTOLIC BLOOD PRESSURE: 137 MMHG | DIASTOLIC BLOOD PRESSURE: 96 MMHG | OXYGEN SATURATION: 95 %

## 2021-07-09 LAB
ALBUMIN SERPL-MCNC: 3.4 G/DL (ref 3.4–5)
ALP SERPL-CCNC: 59 U/L (ref 40–150)
ALT SERPL W P-5'-P-CCNC: 57 U/L (ref 0–70)
ANION GAP SERPL CALCULATED.3IONS-SCNC: 6 MMOL/L (ref 3–14)
AST SERPL W P-5'-P-CCNC: 24 U/L (ref 0–45)
B2 GLYCOPROT1 IGG SERPL IA-ACNC: <0.6 U/ML
B2 GLYCOPROT1 IGM SERPL IA-ACNC: <2.9 U/ML
BILIRUB SERPL-MCNC: 0.6 MG/DL (ref 0.2–1.3)
BUN SERPL-MCNC: 15 MG/DL (ref 7–30)
CALCIUM SERPL-MCNC: 8.8 MG/DL (ref 8.5–10.1)
CARDIOLIPIN ANTIBODY IGG: <1.6 GPL-U/ML (ref 0–19.9)
CARDIOLIPIN ANTIBODY IGM: 0.2 MPL-U/ML (ref 0–19.9)
CHLORIDE SERPL-SCNC: 108 MMOL/L (ref 94–109)
CO2 SERPL-SCNC: 23 MMOL/L (ref 20–32)
CREAT SERPL-MCNC: 1.01 MG/DL (ref 0.66–1.25)
ERYTHROCYTE [DISTWIDTH] IN BLOOD BY AUTOMATED COUNT: 13.4 % (ref 10–15)
GFR SERPL CREATININE-BSD FRML MDRD: >90 ML/MIN/{1.73_M2}
GLUCOSE SERPL-MCNC: 98 MG/DL (ref 70–99)
HCT VFR BLD AUTO: 47.9 % (ref 40–53)
HGB BLD-MCNC: 16.1 G/DL (ref 13.3–17.7)
INTERPRETATION ECG - MUSE: NORMAL
MCH RBC QN AUTO: 29.1 PG (ref 26.5–33)
MCHC RBC AUTO-ENTMCNC: 33.6 G/DL (ref 31.5–36.5)
MCV RBC AUTO: 87 FL (ref 78–100)
PLATELET # BLD AUTO: 205 10E9/L (ref 150–450)
POTASSIUM SERPL-SCNC: 3.8 MMOL/L (ref 3.4–5.3)
PROT SERPL-MCNC: 7.2 G/DL (ref 6.8–8.8)
RBC # BLD AUTO: 5.53 10E12/L (ref 4.4–5.9)
SODIUM SERPL-SCNC: 138 MMOL/L (ref 133–144)
UFH PPP CHRO-ACNC: 0.6 IU/ML
UFH PPP CHRO-ACNC: 0.71 IU/ML
WBC # BLD AUTO: 7.1 10E9/L (ref 4–11)

## 2021-07-09 PROCEDURE — 86147 CARDIOLIPIN ANTIBODY EA IG: CPT | Performed by: NURSE PRACTITIONER

## 2021-07-09 PROCEDURE — 86146 BETA-2 GLYCOPROTEIN ANTIBODY: CPT | Performed by: NURSE PRACTITIONER

## 2021-07-09 PROCEDURE — 85730 THROMBOPLASTIN TIME PARTIAL: CPT | Performed by: NURSE PRACTITIONER

## 2021-07-09 PROCEDURE — 85613 RUSSELL VIPER VENOM DILUTED: CPT | Performed by: NURSE PRACTITIONER

## 2021-07-09 PROCEDURE — 999N000208 ECHOCARDIOGRAM COMPLETE

## 2021-07-09 PROCEDURE — 85027 COMPLETE CBC AUTOMATED: CPT | Performed by: NURSE PRACTITIONER

## 2021-07-09 PROCEDURE — 250N000013 HC RX MED GY IP 250 OP 250 PS 637: Performed by: INTERNAL MEDICINE

## 2021-07-09 PROCEDURE — 250N000013 HC RX MED GY IP 250 OP 250 PS 637: Performed by: NURSE PRACTITIONER

## 2021-07-09 PROCEDURE — 99207 PR APP CREDIT; MD BILLING SHARED VISIT: CPT | Performed by: NURSE PRACTITIONER

## 2021-07-09 PROCEDURE — 36415 COLL VENOUS BLD VENIPUNCTURE: CPT | Performed by: NURSE PRACTITIONER

## 2021-07-09 PROCEDURE — 93306 TTE W/DOPPLER COMPLETE: CPT | Mod: 26 | Performed by: STUDENT IN AN ORGANIZED HEALTH CARE EDUCATION/TRAINING PROGRAM

## 2021-07-09 PROCEDURE — 255N000002 HC RX 255 OP 636: Performed by: INTERNAL MEDICINE

## 2021-07-09 PROCEDURE — 85520 HEPARIN ASSAY: CPT | Performed by: PHYSICIAN ASSISTANT

## 2021-07-09 PROCEDURE — 85520 HEPARIN ASSAY: CPT | Performed by: INTERNAL MEDICINE

## 2021-07-09 PROCEDURE — 36415 COLL VENOUS BLD VENIPUNCTURE: CPT | Performed by: INTERNAL MEDICINE

## 2021-07-09 PROCEDURE — 36415 COLL VENOUS BLD VENIPUNCTURE: CPT | Performed by: PHYSICIAN ASSISTANT

## 2021-07-09 PROCEDURE — 85525 HEPARIN NEUTRALIZATION: CPT | Performed by: NURSE PRACTITIONER

## 2021-07-09 PROCEDURE — 99239 HOSP IP/OBS DSCHRG MGMT >30: CPT | Performed by: INTERNAL MEDICINE

## 2021-07-09 PROCEDURE — 93975 VASCULAR STUDY: CPT | Mod: 26 | Performed by: STUDENT IN AN ORGANIZED HEALTH CARE EDUCATION/TRAINING PROGRAM

## 2021-07-09 PROCEDURE — 93975 VASCULAR STUDY: CPT

## 2021-07-09 PROCEDURE — 80053 COMPREHEN METABOLIC PANEL: CPT | Performed by: NURSE PRACTITIONER

## 2021-07-09 PROCEDURE — 999N001035 HC STATISTIC THROMBIN TIME NC: Performed by: NURSE PRACTITIONER

## 2021-07-09 PROCEDURE — 999N001023 HC STATISTIC INR NC: Performed by: NURSE PRACTITIONER

## 2021-07-09 RX ORDER — LISINOPRIL 5 MG/1
5 TABLET ORAL DAILY
Qty: 90 TABLET | Refills: 0 | Status: SHIPPED | OUTPATIENT
Start: 2021-07-09 | End: 2021-11-11

## 2021-07-09 RX ORDER — ATORVASTATIN CALCIUM 20 MG/1
20 TABLET, FILM COATED ORAL DAILY
Qty: 90 TABLET | Refills: 0 | Status: SHIPPED | OUTPATIENT
Start: 2021-07-09 | End: 2023-05-05

## 2021-07-09 RX ORDER — LIDOCAINE 4 G/G
1 PATCH TOPICAL EVERY 24 HOURS
COMMUNITY
Start: 2021-07-09

## 2021-07-09 RX ORDER — ISOSORBIDE MONONITRATE 30 MG/1
30 TABLET, EXTENDED RELEASE ORAL DAILY
Status: DISCONTINUED | OUTPATIENT
Start: 2021-07-09 | End: 2021-07-09 | Stop reason: HOSPADM

## 2021-07-09 RX ORDER — ISOSORBIDE MONONITRATE 30 MG/1
30 TABLET, EXTENDED RELEASE ORAL DAILY
Qty: 90 TABLET | Refills: 0 | Status: SHIPPED | OUTPATIENT
Start: 2021-07-10 | End: 2021-07-14

## 2021-07-09 RX ORDER — LISINOPRIL 2.5 MG/1
5 TABLET ORAL DAILY
Status: DISCONTINUED | OUTPATIENT
Start: 2021-07-09 | End: 2021-07-09 | Stop reason: HOSPADM

## 2021-07-09 RX ADMIN — HUMAN ALBUMIN MICROSPHERES AND PERFLUTREN 5 ML: 10; .22 INJECTION, SOLUTION INTRAVENOUS at 10:07

## 2021-07-09 RX ADMIN — ASPIRIN 81 MG: 81 TABLET, DELAYED RELEASE ORAL at 07:36

## 2021-07-09 RX ADMIN — ISOSORBIDE MONONITRATE 30 MG: 30 TABLET, EXTENDED RELEASE ORAL at 09:55

## 2021-07-09 RX ADMIN — LISINOPRIL 5 MG: 2.5 TABLET ORAL at 10:30

## 2021-07-09 RX ADMIN — OMEPRAZOLE 40 MG: 20 CAPSULE, DELAYED RELEASE ORAL at 07:35

## 2021-07-09 RX ADMIN — RIVAROXABAN 15 MG: 15 TABLET, FILM COATED ORAL at 09:55

## 2021-07-09 RX ADMIN — DOCUSATE SODIUM 50 MG AND SENNOSIDES 8.6 MG 2 TABLET: 8.6; 5 TABLET, FILM COATED ORAL at 07:36

## 2021-07-09 ASSESSMENT — ACTIVITIES OF DAILY LIVING (ADL)
ADLS_ACUITY_SCORE: 13

## 2021-07-09 NOTE — PROGRESS NOTES
Brief Internal Medicine Note     Called patient at home after discharge to relay results of both his liver US and TTE.  He states he had a 7/10 headache that is persistent this afternoon.  He denies any weakness and has no focal deficits.      I instructed him to check his BP on his home BP cuff and present to ED for SBPs >180 sustained or SBP <90 sustained.  He did state that he noted the headache about 30-45 min after taking the imdur which is likely culprit.      He will continue to monitor at home and knows to present to the ER for any clinical change, any new neurologic change, headache that does not improve with medication or high or low BPs (as stated above).  Have instructed him to hold his Imdur until he is seen by his PCP or cardiologist.     Alexandria Guo, MPH, [facility]  Hospitalist Service  Pager [phone]

## 2021-07-09 NOTE — PLAN OF CARE
"/78 (BP Location: Right arm)   Pulse 62   Temp 98  F (36.7  C) (Oral)   Resp 18   Ht 1.956 m (6' 5\")   Wt 137.3 kg (302 lb 11.2 oz)   SpO2 94%   BMI 35.89 kg/m     Pt. VSS on RA. No reports of SOB, LS clear. Pt. A/O x4. Reported increasing abd. pain and new numbness in RLE. Crosscover notified. No reports of nausea. BS + x4, No BM overnight, LBM 7/6/21 per pt report. Voiding spontaneously without saving. Pt up independently in room. Heparin infusion decreased to 1500 units/ hr after Xa result and infusing in L. PIV. Tall bed ordered for patient and in room.    Plan: NPO since MN for abd. US this AM. Will continue to monitor.  "

## 2021-07-09 NOTE — DISCHARGE SUMMARY
[facility]  Hospitalist Discharge Summary      Date of Admission:  7/8/2021  Date of Discharge:  7/9/2021  Discharging Provider: Alexandria Guo NP  Discharge Team: Hospitalist Service, Gold 7    Discharge Diagnoses   1. Acute PVT  2. Acute abdominal pain due to above.     3. Acute on chronic chest pain   4. Hx non-obstructive CAD  5. Hx progressive angina   6. Hypertension  7. Hyperlipidemia  8. 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 Depression  10. Anxiety  11. Ascending aortic aneurysm    Follow-ups Needed After Discharge   Follow-up Appointments     Adult [facility] Follow-up and recommended labs and tests      Please set up an appointment with:  1.  Your primary care doctor within 1 week for hospital follow up.   2.  Cardiology, Dr. Barreto or Quynh Waterman PA-C within 2 weeks   3.  Hematology within one month (referral placed)  4.  Sleep medicine   5.  Podiatry for ongoing foot injections   6.  Gastroenterology for abovementioned scopes       Appointments on [place] and/or [facility] (with [facility] or [facility]   provider or service). Call [phone] if you haven't heard regarding   these appointments within 7 days of discharge.         Follow Up and recommended labs and tests      CBC and CMP at PCP office within one week.             Unresulted Labs Ordered in the Past 30 Days of this Admission     Date and Time Order Name Status Description    7/9/2021 0921 Beta 2 Glycoprotein Antibodies IGG IGM In process     7/9/2021 0921 Cardiolipin Nubia IgG and IgM In process     7/9/2021 0921 Lupus Anticoagulant Panel In process       These results will be followed up by PCP and Hematology consultant.     Discharge Disposition   Discharged to home  Condition at discharge: Stable    Hospital Course   Jace Pratt is a 40 year old male admitted on 7/8/2021. He has a past medical history saddle PE after foot surgery requiring catheter-directed lytic at  "[facility] (3/2019) , hyperlipidemia, hypertension, non-occlusive CAD, depression, anxiety, ascending aortic aneurysm (4.2 cm), former smoker who presented to [facility] ED with severe epigastric abdominal pain (worsening over past 7 days).  CT A/P in ED demonstrated an acute portal vein thrombus for which he was started on a therapeutic heparin infusion and ultimately transitioned to Xarelto.  The following medical problems were addressed this admission:     #Acute PVT  #Acute abdominal pain due to above  Unclear underlying etiology.  No hx of liver failure though hx of bilateral PEs in 2019.  Was supposed to be on lifelong Xarelto per patient, has not taken in 3 months due to \"life changes\".  Liver US with dopplers completed on 7/9, awaiting formal read.  Will call patient with these results.   Pain improved with anticoagulation.   On discharge:  - Continue Xarelto 15 mg BID through 7/30 and then 20 mg daily.  Likely will be lifelong but will need to follow up with hematology.      #Acute on chronic chest pain   #Hx non-obstructive CAD  #Hx progressive angina   #Hypertension  #Hyperlipidemia  Follows as OP with Dr. Barreto and most recently with Quynh Waterman PA-C 11/2020.  Family hx of sudden cardiac death and PEs/DVTs.  Underwent calcium score that had a score of 122 in RCA and 30 in LAD for total 152 (placing him in 99th percentile).  S/p treatmill nuclear stress test where had a inferior and inferolateral defect that was consistent with infarct and an EF of 47&.  TTE afterwards showed a normal EF but dilated IVC and borderline RV enlargement.  S/p coronary angio 12/2020 that showed just a 10% proximal and mid-LAD lesion and a 10% proximal to mid RCA lesion - there was no intervention.  Continues to have chest pain multiple times per day, OP (poviders are working up other sources of chest pain including GERD).  Supposed to be managed on Isosorbide 30 mg daily and Toprol XL 50 mg daily (has not taken in ~3 months).  " EKG here with sinus chrissy, no ischemic changes, troponin flat.  TTE today not yet read, will call patient with results.   On discharge:  - Follow up with Dr. Barreto in 2 weeks   - Resume Imdur 30 mg daily   - Start Lisinopril 5 mg daily   - Anticoagulation as above   - Continue ASA and statin      #Hx saddle PE s/p catheter directed lytic therapy (3/2019)  Completed a 6 month course of Xarelto but was later seen in cardiology clinic who recommended lifelong anticoagulation but patient self discontinued this 3 months ago.  Remained stable on RA throughout admission.    On discharge :  - Anticoagulation as above    - Follow up with cardiology     #Depression  #Anxiety  Previously managed on Buspar 150 mg but no longer taking medication.  Inquired about this and he does not feel it is needed at this time.   On discharge:  - Follow up with PCP if wishing to resume as OP      #Ascending aortic aneurysm (4.2cm)  On discharge:  - Follow up with cardiology      #Chronic LLQ abdominal pain  Ongoing over past year.  Unclear etiology.  Possibly gastritis vs GERD.  CT A/P without cause.  OP workup including GI referral, EGD and colonoscopy (ordered)  On discharge:  - Follow up with GI as ordered by PCP  - Continue PPI and Carafate PRN      #Possible KELTON   Spouse notes recent snoring.    On discharge:  - OP sleep referral placed by PCP this week, patient to follow up     Consultations This Hospital Stay   PHARMACY IP CONSULT    Code Status   Full Code    Time Spent on this Encounter   I, Alexandria Guo NP, personally saw the patient today and spent greater than 30 minutes discharging this patient.       Alexandria Guo NP  [facility] [address] [address]  Phone: [phone]  ______________________________________________________________________    Physical Exam   Vital Signs: Temp: 97.6  F (36.4  C) Temp src: Oral BP: (!) 137/96 Pulse: 61   Resp: 18 SpO2: 95 % O2  Device: None (Room air)    Weight: 302 lbs 11.2 oz  General Appearance: NAD, laying in bed, appears comfortable  Respiratory: Normal effort on RA.  No wheezes.   Cardiovascular: S1S2.  No murmurs appreciated.   GI: Abdomen soft, obese.  Mildly TTP mid epigastric region and LLQ.   Skin: CDI, no jaundice.  No rashes on visualized skin.   Other: Mood and affect appropriate       Primary Care Physician   Deb Peoples    Discharge Orders      Oncology/Hematology Adult Referral      Reason for your hospital stay    Dear Jace Pratt,    You were hospitalized at the [facility] with a new portal vein thrombosis causing abdominal pain and treated with a heparin infusion and then Xarelto.  Over your hospitalization your abdominal pain improved and today you are ready to be discharged home with close outpatient follow-up.  If you continue medication therapy you should continue to improve but if you develop fever, shortness of breath, light headedness, chest pain, severe abdominal pain or bleeding in urine or stools, please seek medical attention.    We are suggesting the following medication changes:  1.  Start Xarelto 15 mg twice daily for 21 days then 20 mg daily with supper starting on 7/30/2021  2.  Start Aspirin 81 mg daily   3.  Start Lisinopril 10 mg daily  4.  Start Imdur 30 mg daily   5.  Start Lipitor 20 mg daily   6.  Continue previously ordered medications by PCP    Please get the following tests done:  1.  Endoscopy and Colonoscopy (as prescribed by your PCP)  2.  Sleep study to work up KELTON (obstructive sleep apnea)    Please set up an appointment with:  1.  Your primary care doctor within 1 week for hospital follow up.   2.  Cardiology, Dr. Barreto or Quynh Waterman PA-C within 2 weeks   3.  Hematology within one month (referral placed)  4.  Sleep medicine   5.  Podiatry for ongoing foot injections   6.  Gastroenterology for abovementioned scopes     It was a pleasure meeting with you  today. Thank you for allowing me and my team the privilege of caring for you today. You are the reason we are here, and I truly hope we provided you with the excellent service you deserve. Please let us know if there is anything else we can do for you so that we can be sure you are leaving completely satisfied with your care experience.    Your hospital unit at the time of discharge is 7A so if you have any questions please call the hospital at [phone] and ask to talk to a nurse on 7A.    Take care!  Alexandria Guo, MPH, [facility]  Hospitalist Service  Pager [phone]     Adult [facility] Follow-up and recommended labs and tests    Please set up an appointment with:  1.  Your primary care doctor within 1 week for hospital follow up.   2.  Cardiology, Dr. Barreto or Quynh Waterman PA-C within 2 weeks   3.  Hematology within one month (referral placed)  4.  Sleep medicine   5.  Podiatry for ongoing foot injections   6.  Gastroenterology for abovementioned scopes       Appointments on [place] and/or [facility] (with [facility] or [facility] provider or service). Call [phone] if you haven't heard regarding these appointments within 7 days of discharge.     Follow Up and recommended labs and tests    CBC and CMP at PCP office within one week.     Activity    Your activity upon discharge: activity as tolerated     Monitor and record    blood pressure daily, report levels to PCP office.     When to contact your care team    Call your PCP or return to ED for temperatures > 100.7 degrees, worsening or changing pain, uncontrolled vomiting or inability to tolerate oral intake, new or worsening diarrhea, new or worsening shortness of breath, decreased urine output, yellowing of the eyes or skin, confusion, weakness, blood in urine or stools.     Full Code     Diet    Follow this diet upon discharge: Heart healthy, low sodium       Significant Results and Procedures   Most Recent 3 CBC's:  Recent Labs   Lab Test  07/09/21  0600 07/08/21  0922 07/07/21  0910   WBC 7.1 7.2 6.0   HGB 16.1 16.6 16.3   MCV 87 87 85    194 191     Most Recent 3 BMP's:  Recent Labs   Lab Test 07/09/21  0600 07/08/21  0922 07/07/21  0910    136 139   POTASSIUM 3.8 5.1 4.3   CHLORIDE 108 108 105   CO2 23 26 27   BUN 15 14 16   CR 1.01 1.02 1.17   ANIONGAP 6 3 7   JANIE 8.8 8.7 10.2*   GLC 98 95 83     Most Recent 2 LFT's:  Recent Labs   Lab Test 07/09/21  0600 07/08/21  0922   AST 24 40   ALT 57 65   ALKPHOS 59 59   BILITOTAL 0.6 0.8     Most Recent 3 INR's:  Recent Labs   Lab Test 04/20/19  1203   INR 1.29*     Most Recent INR's and Anticoagulation Dosing History:  Anticoagulation Dose History     Recent Dosing and Labs Latest Ref Rng & Units 4/20/2019    INR 0.86 - 1.14 1.29(H)        Most Recent Cholesterol Panel:  Recent Labs   Lab Test 07/07/21  0910   CHOL 176   *   HDL 45   TRIG 106     Most Recent 6 glucoses:  Recent Labs   Lab Test 07/09/21  0600 07/08/21  0922 07/07/21  0910 12/01/20  0044 09/15/20  2242 09/14/20  1939   GLC 98 95 83 110* 128* 131*     Most Recent Urinalysis:  Recent Labs   Lab Test 07/08/21  0926   COLOR Light Yellow   APPEARANCE Clear   URINEGLC Negative   URINEBILI Negative   URINEKETONE Negative   SG 1.023   UBLD Negative   URINEPH 6.5   PROTEIN Negative   NITRITE Negative   LEUKEST Negative   RBCU 1   WBCU 1     Most Recent Anemia Panel:  Recent Labs   Lab Test 07/09/21  0600   WBC 7.1   HGB 16.1   HCT 47.9   MCV 87      ,   Results for orders placed or performed during the hospital encounter of 07/08/21   CT Abdomen Pelvis w Contrast     Value    Radiologist flags Left portal vein thrombosis (Urgent)    Narrative    EXAMINATION: CT ABDOMEN PELVIS W CONTRAST, 7/8/2021 10:18 AM    TECHNIQUE:  Helical CT images from the lung bases through the  symphysis pubis were obtained with IV contrast. Contrast dose:    iopamidol (ISOVUE-370) solution 135 mL     COMPARISON: None    HISTORY: Diverticulitis  suspected. Epigastric and left lower quadrant  pain.    FINDINGS:    Abdomen and pelvis: There is thrombosis of the left portal vein.  Diffuse hypoattenuation of the hepatic parenchyma. No focal hepatic  lesion. Cholecystectomy. No biliary dilation. Pancreas, spleen,  adrenal glands are normal. 2 mm nonobstructing stone in the left  kidney interpolar collecting system. Right kidney is normal. Ureters  and urinary bladder are normal. Colonic diverticulosis without acute  inflammation. No dilated bowel. Appendectomy. No free air. No  lymphadenopathy. Major vessels are unremarkable. Changes in the  inguinal canals bilaterally suggestive of prior hernia repairs.    Lower thorax:  Mild dependent atelectasis in the posterior lung bases.    Bones and soft tissues: No acute or suspicious osseous lesion. Small  fat-containing umbilical hernia. Fat-containing Spigellian type hernia  on the right with fat in the abdominal wall itself.      Impression    IMPRESSION:   1. Acute appearing thrombosis of the left portal vein.  2. Hepatic steatosis.  3. Fat-containing right Spigellian hernia.    [Urgent Result: Left portal vein thrombosis]    Finding was identified on 7/8/2021 10:43 AM.     Dr. Dean was contacted by Dr. Tavares at 7/8/2021 11:20 AM and  verbalized understanding of the urgent finding.      I have personally reviewed the examination and initial interpretation  and I agree with the findings.    ARTI CONWAY MD         SYSTEM ID:  [id number]   US Abdomen Limited w Abd/Pelvis Duplex Complete    Narrative    EXAMINATION: US ABDOMEN LIMITED WITH DOPPLER COMPLETE 7/9/2021 8:41 AM      COMPARISON: CT AP 7/8/2021    HISTORY: Liver ultrasound with Doppler due to newly diagnosed portal  vein thrombosis    TECHNIQUE: The right upper quadrant of the abdomen was scanned in  standard fashion with specialized ultrasound transducer(s) using both  gray-scale, color Doppler, and spectral flow techniques.    Findings:  Liver:  Limited sonographic evaluation of the liver demonstrates no  obvious focal masses.     Main portal vein measures 1.6 cm in transverse diameter. Extrahepatic  portal vein flow is antegrade at 30 cm/s.  Right portal vein flow is antegrade, measuring 32 cm/s.  Visualized proximal left portal vein vein flow is retrograde,  measuring 22 cm/s. Intrahepatic left portal vein is not well  visualized.    Flow in the hepatic artery is towards the liver : 43 cm/s peak  systolic, with suboptimal demonstration of the arterial waveform  likely technical.     The splenic vein is patent and flow is towards the liver.  The left,  middle, and right hepatic veins are patent with flow towards the IVC.  The IVC is patent with flow towards the heart.   The visualized aorta  is not dilated.    Gallbladder: Surgically absent    Bile Ducts: No intrahepatic biliary ductal dilatation. The common bile  duct is not visualized.     Pancreas: Visualized portions of the head and body of the pancreas are  unremarkable.     Right kidney: Measures approximately 11 cm in length (measured  independently on the workstation). No hydronephrosis.    Fluid: No evidence of ascites or pleural effusions.      Impression    Impression:     1.  Retrograde flow in the visualized proximal left portal vein, with  nonvisualization of the distal left portal vein, likely secondary to  left portal vein thrombosis as seen on CT 2021.  2.   Patent hepatic artery and main portal vein. Arterial waveform is  not well demonstrated, likely secondary to technical factors.   Echo Complete    Narrative    [id number]  [id number]  [id number]  [id number]^KEYON^BANDAR^MIRIAN     [facility],[place]  Echocardiography Laboratory  [address] [place] [id number]     Name: ANKUSH QUINTERO  MRN: [id number]  : 1980  Study Date: 2021 09:43 AM  Age: 40 yrs  Gender: Male  Patient Location: [facility]  Reason For Study: CAD  Ordering Physician: KEYON  BANDAR VELA  Performed By: Poly Barr RDCS     BSA: 2.7 m2  Height: 77 in  Weight: 306 lb  HR: 57  BP: 152/94 mmHg  ______________________________________________________________________________  Procedure  Echocardiogram with two-dimensional, color and spectral Doppler performed.  Contrast Optison. Optison (NDC #9635-5661-33) given intravenously. Patient was  given 5 ml mixture of 3 ml Optison and 6 ml saline. 4 ml wasted.  ______________________________________________________________________________  Interpretation Summary  Global and regional left ventricular function is normal with an EF of 55-60%.  Global right ventricular function is normal. The right ventricle is normal  size.  No significant valvular abnormalities.  IVC diameter <2.1 cm collapsing >50% with sniff suggests a normal RA pressure  of 3 mmHg.  This study was compared with the study from 11/18/2020. No significant changes  in aortic caliber and RV size/function upon direct visual comparison.  ______________________________________________________________________________  Left Ventricle  Global and regional left ventricular function is normal with an EF of 55-60%.  Left ventricular wall thickness is normal. Left ventricular size is normal.  Left ventricular diastolic function is indeterminate.     Right Ventricle  Global right ventricular function is normal. The right ventricle is normal  size.     Atria  Both atria appear normal.     Mitral Valve  The mitral valve is normal. Trace mitral insufficiency is present.     Aortic Valve  The aortic valve is tricuspid. On Doppler interrogation, there is no  significant stenosis or regurgitation.     Tricuspid Valve  The valve leaflets are not well visualized. Trace tricuspid insufficiency is  present. The right ventricular systolic pressure is approximated at 18.5 mmHg  plus the right atrial pressure.     Pulmonic Valve  The valve leaflets are not well visualized. Trace pulmonic insufficiency  is  present.     Vessels  Sinuses of Valsalva 3.9 cm. Ascending aorta 4.1 cm. These are normal when  indexed to BSA. IVC diameter <2.1 cm collapsing >50% with sniff suggests a  normal RA pressure of 3 mmHg.     Pericardium  No pericardial effusion is present.     Compared to Previous Study  This study was compared with the study from 2020 . No significant  changes in aortic caliber and RV size/function upon direct visual comparison.  ______________________________________________________________________________  MMode/2D Measurements & Calculations     IVSd: 0.96 cm  LVIDd: 5.3 cm  LVIDs: 3.9 cm  LVPWd: 1.1 cm  FS: 26.3 %  LV mass(C)d: 202.8 grams  LV mass(C)dI: 75.7 grams/m2  Ao root diam: 3.9 cm  asc Aorta Diam: 4.1 cm  LVOT diam: 2.3 cm  LVOT area: 4.2 cm2  RWT: 0.41     Doppler Measurements & Calculations  MV E max sukhdev: 81.0 cm/sec  MV A max sukhdev: 45.1 cm/sec  MV E/A: 1.8  MV dec slope: 437.0 cm/sec2  TR max sukhdev: 215.0 cm/sec  TR max P.5 mmHg  E/E' av.8  Lateral E/e': 9.2  Medial E/e': 10.5     ______________________________________________________________________________  Report approved by: Juan Jewell 2021 10:35 AM               Discharge Medications   Discharge Medication List as of 2021 10:59 AM      START taking these medications    Details   isosorbide mononitrate (IMDUR) 30 MG 24 hr tablet Take 1 tablet (30 mg) by mouth daily, Disp-90 tablet, R-0, E-Prescribe      Lidocaine (LIDOCARE) 4 % Patch Place 1 patch onto the skin every 24 hours To prevent lidocaine toxicity, patient should be patch free for 12 hrs daily.OTC      lisinopril (ZESTRIL) 5 MG tablet Take 1 tablet (5 mg) by mouth daily, Disp-90 tablet, R-0, E-Prescribe         CONTINUE these medications which have CHANGED    Details   aspirin (ASA) 81 MG EC tablet Take 1 tablet (81 mg) by mouth daily, Disp-90 tablet, R-0, E-Prescribe      atorvastatin (LIPITOR) 20 MG tablet Take 1 tablet (20 mg) by mouth daily, Disp-90  tablet, R-0, E-Prescribe      !! rivaroxaban ANTICOAGULANT (XARELTO) 15 MG TABS tablet Take 1 tablet (15 mg) by mouth 2 times daily (with meals) for 21 days, Disp-42 tablet, R-0, E-Prescribe      !! rivaroxaban ANTICOAGULANT (XARELTO) 20 MG TABS tablet Take 1 tablet (20 mg) by mouth daily (with dinner), Disp-90 tablet, R-0, E-Prescribe       !! - Potential duplicate medications found. Please discuss with provider.      CONTINUE these medications which have NOT CHANGED    Details   EPINEPHrine (ANY BX GENERIC EQUIV) 0.3 MG/0.3ML injection 2-pack Inject 0.3 mLs (0.3 mg) into the muscle as needed for anaphylaxis, Disp-2 each, R-1, E-Prescribe      omeprazole (PRILOSEC) 40 MG DR capsule Take 1 capsule (40 mg) by mouth daily, Disp-30 capsule, R-2, E-Prescribe      sucralfate (CARAFATE) 1 GM tablet Take 1 tablet (1 g) by mouth 4 times daily as needed (epigastric pain), Disp-60 tablet, R-1, E-Prescribe           Allergies   Allergies   Allergen Reactions     Bee Pollen Anaphylaxis     Other reaction(s): Unknown Reaction     Cefazolin Hives     Other reaction(s): Hives     Azithromycin Nausea and Vomiting     Latex Hives     Morphine Hives     Penicillins Nausea and Vomiting     Bupropion Other (See Comments)     Made him feel suicidal  Made him feel suicidal

## 2021-07-09 NOTE — PROVIDER NOTIFICATION
Shantanu Nailscover paged regarding increased pain in abdomen and numbness in RLE. New orders given for PRNs.

## 2021-07-09 NOTE — PLAN OF CARE
Patient discharging home accompanied by his wife. Discharge plan of care reviewed by primary team. No lines or drains in place. All belongings with the patient. Prescriptions per outside pharmacy.

## 2021-07-12 ENCOUNTER — PATIENT OUTREACH (OUTPATIENT)
Dept: CARE COORDINATION | Facility: CLINIC | Age: 41
End: 2021-07-12

## 2021-07-12 DIAGNOSIS — Z71.89 OTHER SPECIFIED COUNSELING: ICD-10-CM

## 2021-07-12 LAB — LA PPP-IMP: NEGATIVE

## 2021-07-12 NOTE — PROGRESS NOTES
Clinic Care Coordination Contact  [facility]/Voicemail       Clinical Data: Care Coordinator Outreach  Outreach attempted x 1.  Left message on patient's voicemail with call back information and requested return call.  Plan:Care Coordinator will try to reach patient again in 1-2 business days.            Leigh Mathew MA  Clinic Care Coordination

## 2021-07-12 NOTE — PATIENT INSTRUCTIONS
7/8/2021 - 7/9/2021 (25 hours  Discharge Diagnoses     1. Acute PVT  2. Acute abdominal pain due to above.     3. Acute on chronic chest pain   4. Hx non-obstructive CAD  5. Hx progressive angina   6. Hypertension  7. Hyperlipidemia  8. 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 Depression  10. Anxiety  11. Ascending aortic aneurysm   Follow-ups Needed After Discharge     Follow-up Appointments     Adult [facility] Follow-up and recommended labs and tests      Please set up an appointment with:  1.  Your primary care doctor within 1 week for hospital follow up.   2.  Cardiology, Dr. Barreto or Quynh Waterman PA-C within 2 weeks   3.  Hematology within one month (referral placed)  4.  Sleep medicine   5.  Podiatry for ongoing foot injections   6.  Gastroenterology for abovementioned scopes      if you have any questions please call the hospital at [phone]

## 2021-07-13 NOTE — PROGRESS NOTES
Clinic Care Coordination Contact  [facility]/Voicemail       Clinical Data: Care Coordinator Outreach  Outreach attempted x 2.  Left message on patient's voicemail with call back information and requested return call.  Plan:  Care Coordinator will do no further outreaches at this time.                Leigh Mathew MA  Clinic Care Coordination

## 2021-07-14 ENCOUNTER — TELEPHONE (OUTPATIENT)
Dept: ORTHOPEDICS | Facility: CLINIC | Age: 41
End: 2021-07-14

## 2021-07-14 ENCOUNTER — VIRTUAL VISIT (OUTPATIENT)
Dept: HEMATOLOGY | Facility: CLINIC | Age: 41
End: 2021-07-14
Attending: INTERNAL MEDICINE
Payer: COMMERCIAL

## 2021-07-14 VITALS — WEIGHT: 306 LBS | BODY MASS INDEX: 36.29 KG/M2

## 2021-07-14 DIAGNOSIS — I81 PORTAL VEIN THROMBOSIS: ICD-10-CM

## 2021-07-14 PROCEDURE — 99205 OFFICE O/P NEW HI 60 MIN: CPT | Mod: 95 | Performed by: INTERNAL MEDICINE

## 2021-07-14 NOTE — TELEPHONE ENCOUNTER
Called Yue back to try and help answer his questions. There was no answer I LVM for him to call Mary or I back when available.    [place], ATC

## 2021-07-14 NOTE — PROGRESS NOTES
Patient was contacted to complete the pre-visit call prior to their video visit with the provider.      Allergies and medications were reviewed.    I thanked them for their time to cover this information     James Green CMA

## 2021-07-14 NOTE — LETTER
2021      RE: Jace Pratt  [address] [facility] [address]       Patient was contacted to complete the pre-visit call prior to their video visit with the provider.      Allergies and medications were reviewed.    I thanked them for their time to cover this information     [facility]              [facility]  [facility] [address] [place] [address]  Phone: [phone], Fax: [phone]    Outpatient Visit Note:    Patient: Jace Pratt  MRN: [id number]  : 1980  [place]: 2021     Jace is a 40 year old who is being evaluated via a billable video visit.      How would you like to obtain your AVS? MyChart  If the video visit is dropped, the invitation should be resent by: Send to e-mail at: [email or web address]  Will anyone else be joining your video visit? No      Video Start Time: 8:36 AM    Video-Visit Details    Type of service:  Video Visit    Video End Time:9:24 AM    Originating Location (pt. Location): Home    Distant Location (provider location):  [facility]     Platform used for Video Visit: [email or web address]      Reason for Consultation: Saddle pulmonary embolism and portal vein thrombosis     Assessment:  In summary, Jace Pratt is a 40 year old gentleman with history of saddle pulmonary embolism, Charcot-Josselyn-Tooth disease, anxiety/depression and CAD who presented to [facility] on 21 with abdominal pain and was found to have portal vein thrombosis. He presents to hematology clinic to establish care.     1. Unprovoked Left Portal Vein Thrombosis. DDx 21 Risk factor(s) 1. Prior PE, stopped anticoagulation  2. Provoked Saddle PE. DDx 3/2019. Risk Factor(s) 1. Foot surgery  3. Non-obstructive CAD with angina  4. Hyperlipidemia    Mr. Pratt presents with unprovoked Left PVT after having experienced a provoked saddle PE in 2019. He also has history of significant CAD for young age.  There is a potential family history of thrombosis on his maternal side. Overall, given age, location and presentation evalution for inheirted and acquired thrombophilias is warranted. This may  of patient and also have long-term implications for health.     Patient received testing for antiphospholipid antibodies while in the hospital. This first set was negative for all three antibodies. Will plan to repeat anti-cardiolipin and beta-2 glycoprotein in 3 months. Will not be able to repeat lupus anticoagulant while on Xarelto.     Will send protein S (free and activity), protein C (chromogenic), antithrombin. These may be low due to recent clot and/or medicaiton so will likely need to be repeated.     Will also send genetic testing for factor V Leiden and prothrombin genetic mutations.     Last, given location of clot as well as Hgb>16.0 will send testing for myeloproliferative neopleams (JAK2, Mpl, CALR). Given location of clot as well as history of red/tea colored urine will send testing for PNH.     Jace has elective endoscopy planned- this should be DELAYED 3 months in order to ensure proper duration of anticoagulation prior to holding.     For Dr. Chavez--- would ideally like to not hold anticoagulation within the first 3 months of treatment due to risk of propagation/recurrance or new clot. However, if ankle injections are needed, could consider earliest date of 8/24/21 (~6 weeks).     At present Jace does not have s/s of liver damage from thrombosis. Will repeat imaging and labs in 3 months. If he develops signs of portal hypertension will refer to hepatology.     Plan:  1. Majority of today's visit was spent counseling the patient regarding workup, evaluation and treatment of portal vein thrombosis.  2.   Orders Placed This Encounter   Procedures     US Abdomen Limited     CT Chest abdomen w & wo contrast     Antithrombin III     Protein C chromogenic     Protein S Antigen Free     Other  Laboratory; Versiti; Protein S activity [id number] (Laboratory Miscellaneous Order)     Cardiolipin Nubia IgG and IgM     Beta 2 Glycoprotein 1 Antibody IgM     Beta 2 Glycoprotein 1 Antibody IgG   3. Continue Xarelto at loading dose and transition to 20 mg PO daily at end of 21 days    The patient is given our center's contact information and is instructed to call if he should have any further questions or concerns.  Otherwise, we will plan on seeing him back Follow up with Provider - 3 months .      Yue Nelson and Marylu De La Rosa, nurse clinicians are involved and assigned with the coordination of care for this patient.   Patient understands and agrees with the above plan and recommendation.    Marilu Callejas MD/PhD   of Medicine  Division of Hematology, Oncology and Transplantation    Extended time: I spent >120 minutes before and after visit with patient reviewing records from outside providers and discussing and arranging with laboratory proper evaluation of labs. Extended time needed to avoid duplication of services and proper interpretation of labs.    ----------------------------------------------------------------------------------------------------------------------  History of Present Illness:  Jace Pratt is a 40 year old gentleman with history of saddle pulmonary embolism, Charcot-Josselyn-Tooth disease, anxiety/depression and CAD who presented to [facility] on 7/8/21 with abdominal pain and was found to have portal vein thrombosis. He presents to hematology clinic to establish care.     For his PE- Jace reports that had has elective r foot calcaneal osteotomy performed and was on a scooter when he started to have SOB. hen he was trying to use the bathroom he was out of breath. This was different. He had pains in chest as well. Call family and went in to [facility]. Per patient he did not receive genetic testing. Jace was discharged on Xarelto. Patient endorses that his medication compliance  was not the best due to divorce, family situation. The first six month he took religiously. Would go off for a few weeks to get tattoo, etc. Patient's grandmother had aneurysm in her brain while on Xarelto. Family has concern that he is on the drug. Therefore, at the time of his presentation with his portal vein thrombosis he was not on medication.     Jace states that over the last year or so he was having some issues with abdominal pain. Presented to his primary doctor. Over the 4th started to get pain in his upper abdomen. Primary felt the pain and ordered endoscopy. Called his brother (transplant coordinator at the ), and due to worsening pain he went the . He is tolerating the Xarelto now. Spouse in making sure he is taking. Feels more fatigued with less motivation, which is atypical. Is not sleep more. Does not wake up refreshed. Spouse thinks he may have sleep apnea.       Has CMT and will be getting injections in his R and L ankles. Presents with extreme stiffness the day after activity. No falls. No issues with swallowing.     Jace does not have issues with vomiting. Jace endorses having constipation. Will stool every 2 days or longer. Rare loose, frequent stool. + blood in his stool. Last was 6-8 months ago. Will also have black tarry stool.   No history of blood in urine. Will notice dark, tea color urine. This will happen in the evening. Jace states he averages 6-8 glasses of water throughout the day. Jace will have muscle cramping at least once a day. Normally occur in his hamstring and his shins. Will just wait for it to go away and do stretching.     Abdominal pain is currently at a 3. No provoking factors, will wax and wane. Will have some some anxiety. Endorses bloating. No unintentional weight loss. No itching. Will note some fingers will get freezing cold (pointer fingers), other fingers will feel warm. Endorses lower back and legs bone aches, getting worse at night.     Past Medical  History:  Past Medical History:   Diagnosis Date     Acute saddle pulmonary embolism with acute cor pulmonale (H)      Anxiety      Ascending aortic aneurysm (H)      CAD (coronary artery disease) 12/01/2020    Mild nonobstructive     Chest pain      Depression      ANTON (dyspnea on exertion)      Former smoker      Neuromuscular disorder (H)      S/P coronary angiogram 12/01/2020    No intervention     Stable angina (H)      Varicose veins of right lower extremity with edema        Past Surgical History:  Past Surgical History:   Procedure Laterality Date     ANKLE SURGERY Right 2019    CMT     APPENDECTOMY  2007     CHOLECYSTECTOMY  2008     CV HEART CATHETERIZATION WITH POSSIBLE INTERVENTION N/A 12/1/2020    Procedure: Heart Catheterization with Possible Intervention;  Surgeon: Nurys Lopez MD;  Location:  HEART CARDIAC CATH LAB     CV LEFT HEART CATH N/A 12/1/2020    Procedure: Left Heart Cath;  Surgeon: Nurys Lopez MD;  Location:  HEART CARDIAC CATH LAB       Medications:  Current Outpatient Medications   Medication Sig Dispense Refill     aspirin (ASA) 81 MG EC tablet Take 1 tablet (81 mg) by mouth daily 90 tablet 0     atorvastatin (LIPITOR) 20 MG tablet Take 1 tablet (20 mg) by mouth daily 90 tablet 0     EPINEPHrine (ANY BX GENERIC EQUIV) 0.3 MG/0.3ML injection 2-pack Inject 0.3 mLs (0.3 mg) into the muscle as needed for anaphylaxis 2 each 1     Lidocaine (LIDOCARE) 4 % Patch Place 1 patch onto the skin every 24 hours To prevent lidocaine toxicity, patient should be patch free for 12 hrs daily.       lisinopril (ZESTRIL) 5 MG tablet Take 1 tablet (5 mg) by mouth daily 90 tablet 0     rivaroxaban ANTICOAGULANT (XARELTO) 15 MG TABS tablet Take 1 tablet (15 mg) by mouth 2 times daily (with meals) for 21 days 42 tablet 0     [START ON 7/30/2021] rivaroxaban ANTICOAGULANT (XARELTO) 20 MG TABS tablet Take 1 tablet (20 mg) by mouth daily (with dinner) 90 tablet 0        Allergies:  Allergies    Allergen Reactions     Bee Pollen Anaphylaxis     Other reaction(s): Unknown Reaction     Cefazolin Hives     Other reaction(s): Hives     Azithromycin Nausea and Vomiting     Latex Hives     Morphine Hives     Penicillins Nausea and Vomiting     Bupropion Other (See Comments)     Made him feel suicidal  Made him feel suicidal         ROS:  Remainder of a comprehensive 14 point ROS is negative unless noted above.    Social History:  Currently unemployed.   No significant tobacco use. Rare social alcohol use. Marijuana use, uses delta-8 product to help with sleep  Denies any tobacco use. No significant alcohol use. Denies any illicit drug use.   Three children, ages 2, 4, and 8    Family History:  Father- alive, no chronic health issues  Paternal grandfather- heart disease  [place] grandmother- heart disease    Mother- alive, depression/anxiety  Maternal grandmother- aneurysm, history of clotting complications later in life  Maternal grandfather- alive,     Brother- alive    Objectives:  GENERAL: Healthy, alert and no distress  EYES: Eyes grossly normal to inspection.  No discharge or erythema, or obvious scleral/conjunctival abnormalities.  RESP: No audible wheeze, cough, or visible cyanosis.  No visible retractions or increased work of breathing.    SKIN: Visible skin clear. No significant rash, abnormal pigmentation or lesions.  NEURO: Cranial nerves grossly intact.  Mentation and speech appropriate for age.  PSYCH: Mentation appears normal, affect normal/bright, judgement and insight intact, normal speech and appearance well-groomed.    Labs:  7/6/21  -- lupus anticoagulant, beta-2 glycoprotein, cardiolipin negative    CBC RESULTS: Recent Labs   Lab Test 07/09/21  0600   WBC 7.1   RBC 5.53   HGB 16.1   HCT 47.9   MCV 87   MCH 29.1   MCHC 33.6   RDW 13.4            Imaging:  US abdomen   1.  Retrograde flow in the visualized proximal most left portal vein,  with nonvisualization of the remainder left  portal vein, likely  secondary to left portal vein thrombosis as seen on CT 7/8/2021.  2.   Patent hepatic artery and main portal vein. Arterial waveform is  not well demonstrated, likely secondary to technical factors.       CT abdomen 7/8/21  There is thrombosis of the left portal vein.  Diffuse hypoattenuation of the hepatic parenchyma. No focal hepatic  lesion. Cholecystectomy. No biliary dilation. Pancreas, spleen,  adrenal glands are normal. 2 mm nonobstructing stone in the left  kidney interpolar collecting system. Right kidney is normal. Ureters  and urinary bladder are normal. Colonic diverticulosis without acute  inflammation. No dilated bowel. Appendectomy. No free air. No  lymphadenopathy. Major vessels are unremarkable. Changes in the  inguinal canals bilaterally suggestive of prior hernia repairs.     Lower thorax:  Mild dependent atelectasis in the posterior lung bases.     Bones and soft tissues: No acute or suspicious osseous lesion. Small  fat-containing umbilical hernia. Fat-containing Spigellian type hernia  on the right with fat in the abdominal wall itself.                                                                      IMPRESSION:   1. Acute appearing thrombosis of the left portal vein.  2. Hepatic steatosis.  3. Fat-containing right Spigellian hernia.      9/14/2020  INDICATION: Chest pain and shortness of breath. History of pulmonary emboli.  COMPARISON: None.  TECHNIQUE: CT chest pulmonary angiogram during arterial phase injection of IV contrast. Multiplanar reformats and MIP reconstructions were performed. Dose reduction techniques were used.   CONTRAST: 77mL Isovue-370     FINDINGS:  ANGIOGRAM CHEST: Pulmonary arteries are normal caliber and negative for pulmonary emboli. Thoracic aorta is negative for dissection. Ectatic ascending thoracic aorta measuring 4.2 cm in diameter. No CT evidence of right heart strain.     LUNGS AND PLEURA: Mild dependent atelectasis in the lungs. No acute  appearing infiltrates or consolidation. Central airways are clear. No pleural effusions.     MEDIASTINUM/AXILLAE: Normal.     UPPER ABDOMEN: Cholecystectomy.     MUSCULOSKELETAL: Normal.                                                                      IMPRESSION:  1.  Negative for pulmonary embolism. No acute findings in the chest or CT abnormalities to explain the patient's symptoms.     2.  Minor atelectasis in the lungs. No evidence for pneumonitis.     3.  Ectasia of the ascending thoracic aorta measuring 4.2 cm in diameter.      Marilu Callejas MD

## 2021-07-14 NOTE — TELEPHONE ENCOUNTER
Discussed with [facility] pt's upcoming appt with Dr Chavez for evaluation right foot.  Dr Chavez does not perform foot and ankle injections, but orders them to be done with interventional radiology.  Mary Mata RN

## 2021-07-14 NOTE — PATIENT INSTRUCTIONS
[facility]  [facility]  [address] [place] [address], [place] [address]  Main: [phone], Fax: [phone]    It was a pleasure seeing you today.  Thank you for allowing us to be involved in your care.  Please let us know if there is anything else we can do for you, so that we can be sure you are leaving completely satisfied with your care experience.    Labs to be done soon.  We will communicate these to you by CarePaymentt. With your permission we may leave a detailed voicemail, please see below for our contact information should you have any questions about your results. Also, please note that some lab results may take a week or so to get back. Feel free to call or message if you have not heard back from us within 7-10 days.    DO NOT stop your blood thinner at present. IF instructed to do so please contact Hematology clinic.     We would like you to repeat your imaging in 3 months.  This can be arranged on the same day as your return appointment.  [facility] imaging is located in the East building (the smaller revolving door) across the street from our clinic ([address] [place] [address], [facility]).  Imaging check-in is located on the back side of the [facility] area.    Patient Education & Resources:  For additional information, please see the following web links:  www.stoptheclot.org, www.clotconnect.org.    Call the [facility]  at [phone].     -If surgeries or procedures are planned (for holding instructions).     -If off anticoagulation, please call during high risk times (long-distance travel, broken bones or trauma, immobilization, surgery, pregnancy, or taking estrogen).     -Any new symptoms of DVT (deep vein thrombosis) or PE (pulmonary embolism)    -pain     -swelling     -redness    -warmth    -shortness of breath    -chest pain    -coughing up blood    We would like a provider on our team to see you at least  annually for optimal care and to allow us to continue to prescribe for you.  Guillermo Uribe PA-C and Zulema Bose PA-C are our physician assistants that are specialized in bleeding and clotting disorders that you may be able to see more readily.    Return to clinic in  3 months.  Please schedule return appointment with Dr. Callejas.    Your nurse clinician is Marylu De La Rosa,  MSN, RN, [phone].   If they are unavailable and you have immediate concerns, please call [phone] and ask for a nurse.

## 2021-07-14 NOTE — PROGRESS NOTES
[facility]  [facility] [place] [address]  Phone: [phone], Fax: [phone]    Outpatient Visit Note:    Patient: Jace Pratt  MRN: [id number]  : 1980  [place]: 2021     Jace is a 40 year old who is being evaluated via a billable video visit.      How would you like to obtain your AVS? MyChart  If the video visit is dropped, the invitation should be resent by: Send to e-mail at: [email or web address]  Will anyone else be joining your video visit? No      Video Start Time: 8:36 AM    Video-Visit Details    Type of service:  Video Visit    Video End Time:9:24 AM    Originating Location (pt. Location): Home    Distant Location (provider location):  [facility]     Platform used for Video Visit: [email or web address]      Reason for Consultation: Saddle pulmonary embolism and portal vein thrombosis     Assessment:  In summary, Jace Pratt is a 40 year old gentleman with history of saddle pulmonary embolism, Charcot-Josselyn-Tooth disease, anxiety/depression and CAD who presented to [facility] on 21 with abdominal pain and was found to have portal vein thrombosis. He presents to hematology clinic to establish care.     1. Unprovoked Left Portal Vein Thrombosis. DDx 21 Risk factor(s) 1. Prior PE, stopped anticoagulation  2. Provoked Saddle PE. DDx 3/2019. Risk Factor(s) 1. Foot surgery  3. Non-obstructive CAD with angina  4. Hyperlipidemia    Mr. Pratt presents with unprovoked Left PVT after having experienced a provoked saddle PE in 2019. He also has history of significant CAD for young age. There is a potential family history of thrombosis on his maternal side. Overall, given age, location and presentation evalution for inheirted and acquired thrombophilias is warranted. This may  of patient and also have long-term implications for health.     Patient received testing for antiphospholipid  antibodies while in the hospital. This first set was negative for all three antibodies. Will plan to repeat anti-cardiolipin and beta-2 glycoprotein in 3 months. Will not be able to repeat lupus anticoagulant while on Xarelto.     Will send protein S (free and activity), protein C (chromogenic), antithrombin. These may be low due to recent clot and/or medicaiton so will likely need to be repeated.     Will also send genetic testing for factor V Leiden and prothrombin genetic mutations.     Last, given location of clot as well as Hgb>16.0 will send testing for myeloproliferative neopleams (JAK2, Mpl, CALR). Given location of clot as well as history of red/tea colored urine will send testing for PNH.     Jace has elective endoscopy planned- this should be DELAYED 3 months in order to ensure proper duration of anticoagulation prior to holding.     For Dr. Chavez--- would ideally like to not hold anticoagulation within the first 3 months of treatment due to risk of propagation/recurrance or new clot. However, if ankle injections are needed, could consider earliest date of 8/24/21 (~6 weeks).     At present Jace does not have s/s of liver damage from thrombosis. Will repeat imaging and labs in 3 months. If he develops signs of portal hypertension will refer to hepatology.     Plan:  1. Majority of today's visit was spent counseling the patient regarding workup, evaluation and treatment of portal vein thrombosis.  2.   Orders Placed This Encounter   Procedures     US Abdomen Limited     CT Chest abdomen w & wo contrast     Antithrombin III     Protein C chromogenic     Protein S Antigen Free     Other Laboratory; [facility]; Protein S activity [id number] (Laboratory Miscellaneous Order)     Cardiolipin Nubia IgG and IgM     Beta 2 Glycoprotein 1 Antibody IgM     Beta 2 Glycoprotein 1 Antibody IgG   3. Continue Xarelto at loading dose and transition to 20 mg PO daily at end of 21 days    The patient is given our center's contact  information and is instructed to call if he should have any further questions or concerns.  Otherwise, we will plan on seeing him back Follow up with Provider - 3 months .      Yue Nelson and Marylu De La Rosa, nurse clinicians are involved and assigned with the coordination of care for this patient.   Patient understands and agrees with the above plan and recommendation.    Marilu Callejas MD/PhD   of Medicine  Division of Hematology, Oncology and Transplantation    Extended time: I spent >120 minutes before and after visit with patient reviewing records from outside providers and discussing and arranging with laboratory proper evaluation of labs. Extended time needed to avoid duplication of services and proper interpretation of labs.    ----------------------------------------------------------------------------------------------------------------------  History of Present Illness:  Jace Pratt is a 40 year old gentleman with history of saddle pulmonary embolism, Charcot-Josselyn-Tooth disease, anxiety/depression and CAD who presented to [facility] on 7/8/21 with abdominal pain and was found to have portal vein thrombosis. He presents to hematology clinic to establish care.     For his PE- Jace reports that had has elective r foot calcaneal osteotomy performed and was on a scooter when he started to have SOB. hen he was trying to use the bathroom he was out of breath. This was different. He had pains in chest as well. Call family and went in to [facility]. Per patient he did not receive genetic testing. Jace was discharged on Xarelto. Patient endorses that his medication compliance was not the best due to divorce, family situation. The first six month he took religiously. Would go off for a few weeks to get tattoo, etc. Patient's grandmother had aneurysm in her brain while on Xarelto. Family has concern that he is on the drug. Therefore, at the time of his presentation with his portal vein thrombosis  he was not on medication.     Jace states that over the last year or so he was having some issues with abdominal pain. Presented to his primary doctor. Over the 4th started to get pain in his upper abdomen. Primary felt the pain and ordered endoscopy. Called his brother (transplant coordinator at the ), and due to worsening pain he went the . He is tolerating the Xarelto now. Spouse in making sure he is taking. Feels more fatigued with less motivation, which is atypical. Is not sleep more. Does not wake up refreshed. Spouse thinks he may have sleep apnea.       Has CMT and will be getting injections in his R and L ankles. Presents with extreme stiffness the day after activity. No falls. No issues with swallowing.     Jace does not have issues with vomiting. Jace endorses having constipation. Will stool every 2 days or longer. Rare loose, frequent stool. + blood in his stool. Last was 6-8 months ago. Will also have black tarry stool.   No history of blood in urine. Will notice dark, tea color urine. This will happen in the evening. Jace states he averages 6-8 glasses of water throughout the day. Jace will have muscle cramping at least once a day. Normally occur in his hamstring and his shins. Will just wait for it to go away and do stretching.     Abdominal pain is currently at a 3. No provoking factors, will wax and wane. Will have some some anxiety. Endorses bloating. No unintentional weight loss. No itching. Will note some fingers will get freezing cold (pointer fingers), other fingers will feel warm. Endorses lower back and legs bone aches, getting worse at night.     Past Medical History:  Past Medical History:   Diagnosis Date     Acute saddle pulmonary embolism with acute cor pulmonale (H)      Anxiety      Ascending aortic aneurysm (H)      CAD (coronary artery disease) 12/01/2020    Mild nonobstructive     Chest pain      Depression      ANTON (dyspnea on exertion)      Former smoker       Neuromuscular disorder (H)      S/P coronary angiogram 12/01/2020    No intervention     Stable angina (H)      Varicose veins of right lower extremity with edema        Past Surgical History:  Past Surgical History:   Procedure Laterality Date     ANKLE SURGERY Right 2019    CMT     APPENDECTOMY  2007     CHOLECYSTECTOMY  2008     CV HEART CATHETERIZATION WITH POSSIBLE INTERVENTION N/A 12/1/2020    Procedure: Heart Catheterization with Possible Intervention;  Surgeon: Nurys Lopez MD;  Location:  HEART CARDIAC CATH LAB     CV LEFT HEART CATH N/A 12/1/2020    Procedure: Left Heart Cath;  Surgeon: Nurys Lopez MD;  Location:  HEART CARDIAC CATH LAB       Medications:  Current Outpatient Medications   Medication Sig Dispense Refill     aspirin (ASA) 81 MG EC tablet Take 1 tablet (81 mg) by mouth daily 90 tablet 0     atorvastatin (LIPITOR) 20 MG tablet Take 1 tablet (20 mg) by mouth daily 90 tablet 0     EPINEPHrine (ANY BX GENERIC EQUIV) 0.3 MG/0.3ML injection 2-pack Inject 0.3 mLs (0.3 mg) into the muscle as needed for anaphylaxis 2 each 1     Lidocaine (LIDOCARE) 4 % Patch Place 1 patch onto the skin every 24 hours To prevent lidocaine toxicity, patient should be patch free for 12 hrs daily.       lisinopril (ZESTRIL) 5 MG tablet Take 1 tablet (5 mg) by mouth daily 90 tablet 0     rivaroxaban ANTICOAGULANT (XARELTO) 15 MG TABS tablet Take 1 tablet (15 mg) by mouth 2 times daily (with meals) for 21 days 42 tablet 0     [START ON 7/30/2021] rivaroxaban ANTICOAGULANT (XARELTO) 20 MG TABS tablet Take 1 tablet (20 mg) by mouth daily (with dinner) 90 tablet 0        Allergies:  Allergies   Allergen Reactions     Bee Pollen Anaphylaxis     Other reaction(s): Unknown Reaction     Cefazolin Hives     Other reaction(s): Hives     Azithromycin Nausea and Vomiting     Latex Hives     Morphine Hives     Penicillins Nausea and Vomiting     Bupropion Other (See Comments)     Made him feel suicidal  Made him feel  suicidal         ROS:  Remainder of a comprehensive 14 point ROS is negative unless noted above.    Social History:  Currently unemployed.   No significant tobacco use. Rare social alcohol use. Marijuana use, uses delta-8 product to help with sleep  Denies any tobacco use. No significant alcohol use. Denies any illicit drug use.   Three children, ages 2, 4, and 8    Family History:  Father- alive, no chronic health issues  Paternal grandfather- heart disease  Matenral grandmother- heart disease    Mother- alive, depression/anxiety  Maternal grandmother- aneurysm, history of clotting complications later in life  Maternal grandfather- alive,     Brother- alive    Objectives:  GENERAL: Healthy, alert and no distress  EYES: Eyes grossly normal to inspection.  No discharge or erythema, or obvious scleral/conjunctival abnormalities.  RESP: No audible wheeze, cough, or visible cyanosis.  No visible retractions or increased work of breathing.    SKIN: Visible skin clear. No significant rash, abnormal pigmentation or lesions.  NEURO: Cranial nerves grossly intact.  Mentation and speech appropriate for age.  PSYCH: Mentation appears normal, affect normal/bright, judgement and insight intact, normal speech and appearance well-groomed.    Labs:  7/6/21  -- lupus anticoagulant, beta-2 glycoprotein, cardiolipin negative    CBC RESULTS: Recent Labs   Lab Test 07/09/21  0600   WBC 7.1   RBC 5.53   HGB 16.1   HCT 47.9   MCV 87   MCH 29.1   MCHC 33.6   RDW 13.4            Imaging:  US abdomen   1.  Retrograde flow in the visualized proximal most left portal vein,  with nonvisualization of the remainder left portal vein, likely  secondary to left portal vein thrombosis as seen on CT 7/8/2021.  2.   Patent hepatic artery and main portal vein. Arterial waveform is  not well demonstrated, likely secondary to technical factors.       CT abdomen 7/8/21  There is thrombosis of the left portal vein.  Diffuse hypoattenuation of the  hepatic parenchyma. No focal hepatic  lesion. Cholecystectomy. No biliary dilation. Pancreas, spleen,  adrenal glands are normal. 2 mm nonobstructing stone in the left  kidney interpolar collecting system. Right kidney is normal. Ureters  and urinary bladder are normal. Colonic diverticulosis without acute  inflammation. No dilated bowel. Appendectomy. No free air. No  lymphadenopathy. Major vessels are unremarkable. Changes in the  inguinal canals bilaterally suggestive of prior hernia repairs.     Lower thorax:  Mild dependent atelectasis in the posterior lung bases.     Bones and soft tissues: No acute or suspicious osseous lesion. Small  fat-containing umbilical hernia. Fat-containing Spigellian type hernia  on the right with fat in the abdominal wall itself.                                                                      IMPRESSION:   1. Acute appearing thrombosis of the left portal vein.  2. Hepatic steatosis.  3. Fat-containing right Spigellian hernia.      9/14/2020  INDICATION: Chest pain and shortness of breath. History of pulmonary emboli.  COMPARISON: None.  TECHNIQUE: CT chest pulmonary angiogram during arterial phase injection of IV contrast. Multiplanar reformats and MIP reconstructions were performed. Dose reduction techniques were used.   CONTRAST: 77mL Isovue-370     FINDINGS:  ANGIOGRAM CHEST: Pulmonary arteries are normal caliber and negative for pulmonary emboli. Thoracic aorta is negative for dissection. Ectatic ascending thoracic aorta measuring 4.2 cm in diameter. No CT evidence of right heart strain.     LUNGS AND PLEURA: Mild dependent atelectasis in the lungs. No acute appearing infiltrates or consolidation. Central airways are clear. No pleural effusions.     MEDIASTINUM/AXILLAE: Normal.     UPPER ABDOMEN: Cholecystectomy.     MUSCULOSKELETAL: Normal.                                                                      IMPRESSION:  1.  Negative for pulmonary embolism. No acute  findings in the chest or CT abnormalities to explain the patient's symptoms.     2.  Minor atelectasis in the lungs. No evidence for pneumonitis.     3.  Ectasia of the ascending thoracic aorta measuring 4.2 cm in diameter.

## 2021-07-14 NOTE — TELEPHONE ENCOUNTER
[facility] Call Center    Phone Message    May a detailed message be left on voicemail: no     Reason for Call: Other: Yue @ [facility] has a question about the patient. Would like a c/b from Mary, if she's not in one of her coworkers      Action Taken: Message routed to:  [facility] ([facility]    Travel Screening: Not Applicable

## 2021-07-16 ENCOUNTER — TELEPHONE (OUTPATIENT)
Dept: HEMATOLOGY | Facility: CLINIC | Age: 41
End: 2021-07-16

## 2021-07-16 NOTE — TELEPHONE ENCOUNTER
RN called Jace regarding follow up from Wednesday July 14th's appointment with Dr. Callejas. Per Dr. Callejas, she would like to see Jace back in 3 months with labs and imaging done prior. Orders are in.     Jace did not answer so a detailed voicemail was left with callback number. RN will also send a [employer] message in case that is preferred method of communication.     Marylu De La Rosa, MSN, RN, PHN - Nurse Clinician - [facility] - [phone]

## 2021-07-22 ENCOUNTER — OFFICE VISIT (OUTPATIENT)
Dept: FAMILY MEDICINE | Facility: CLINIC | Age: 41
End: 2021-07-22
Payer: COMMERCIAL

## 2021-07-22 VITALS
BODY MASS INDEX: 35.42 KG/M2 | RESPIRATION RATE: 14 BRPM | OXYGEN SATURATION: 97 % | DIASTOLIC BLOOD PRESSURE: 74 MMHG | HEART RATE: 79 BPM | HEIGHT: 77 IN | SYSTOLIC BLOOD PRESSURE: 124 MMHG | WEIGHT: 300 LBS | TEMPERATURE: 98.3 F

## 2021-07-22 DIAGNOSIS — J02.0 ACUTE STREPTOCOCCAL PHARYNGITIS: ICD-10-CM

## 2021-07-22 DIAGNOSIS — Z09 HOSPITAL DISCHARGE FOLLOW-UP: Primary | ICD-10-CM

## 2021-07-22 DIAGNOSIS — I81 PORTAL VEIN THROMBOSIS: ICD-10-CM

## 2021-07-22 PROCEDURE — 99495 TRANSJ CARE MGMT MOD F2F 14D: CPT | Performed by: NURSE PRACTITIONER

## 2021-07-22 RX ORDER — CLINDAMYCIN HCL 300 MG
300 CAPSULE ORAL 3 TIMES DAILY
Qty: 30 CAPSULE | Refills: 0 | Status: SHIPPED | OUTPATIENT
Start: 2021-07-22 | End: 2021-08-01

## 2021-07-22 ASSESSMENT — MIFFLIN-ST. JEOR: SCORE: 2388.17

## 2021-07-22 NOTE — PROGRESS NOTES
"    Assessment & Plan     Jace was seen today for recheck.    Diagnoses and all orders for this visit:    Hospital discharge follow-up  Portal vein thrombosis  Patient is s/p hospitalization from 7/8/21 through 7/9/21 for acute abdominal pain and portal vein thrombosis.  Therapeutic heparin infusion was initiated inpatient and was transitioned to Xarelto.  Plan Xarelto 15 mg twice daily through 7/30 and then 20 mg daily.    History of provoked saddle PE in March 2019 (foot surgery).   Consultation with hematology completed on 7/14/21, additional labs/work-up pending.      Acute streptococcal pharyngitis   Was seen in urgent care on 7/21/21 with negative rapid strep and positive PCR today.   Antibiotic regimen prescribed.  History of multiple antibiotic allergies, will treat with Clindamycin.    -     clindamycin (CLEOCIN) 300 MG capsule; Take 1 capsule (300 mg) by mouth 3 times daily for 10 days      30 minutes spent on the date of the encounter doing chart review, history and exam, documentation and further activities per the note       BMI:   Estimated body mass index is 35.57 kg/m  as calculated from the following:    Height as of this encounter: 1.956 m (6' 5\").    Weight as of this encounter: 136.1 kg (300 lb).       Return in about 1 week (around 7/29/2021) for No improvement or sooner with worsening symptoms.    Deb Peoples, [facility]        Pamela Mccormick is a 40 year old who presents for the following health issues:      HPI     ED/UC Followup:    Facility:  [facility]  Date of visit: 7/21/21  Reason for visit: URI Sx and COVID was neg - Strep A Positive   Current Status:           Hospital Follow-up Visit:    Hospital Follow Up:  [facility]    Date of Admission: 7/8/21  Date of Discharge: 7/9/21  Reason for Admission:   Discharge Diagnoses     1. Acute PVT  2. Acute abdominal pain due to above.     3. Acute on chronic chest pain   4. Hx " non-obstructive CAD  5. Hx progressive angina   6. Hypertension  7. Hyperlipidemia  8. 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 Depression  10. Anxiety  11. Ascending aortic aneurysm    Provoked saddle PE in March 2019 (foot surgery).   Unprovoked left portal vein thrombosis - 7/8/21 (prior PE, stopped anticoagulation)    Delay EGD/colonoscopy for 3 months for proper duration of anticoagulation prior to holding.      Was your hospitalization related to COVID-19? No   Problems taking medications regularly:  None  Medication changes since discharge:   Discharge Medication List as of 7/9/2021 10:59 AM           START taking these medications     Details   isosorbide mononitrate (IMDUR) 30 MG 24 hr tablet Take 1 tablet (30 mg) by mouth daily, Disp-90 tablet, R-0, E-Prescribe       Lidocaine (LIDOCARE) 4 % Patch Place 1 patch onto the skin every 24 hours To prevent lidocaine toxicity, patient should be patch free for 12 hrs daily.OTC       lisinopril (ZESTRIL) 5 MG tablet Take 1 tablet (5 mg) by mouth daily, Disp-90 tablet, R-0, E-Prescribe               CONTINUE these medications which have CHANGED     Details   aspirin (ASA) 81 MG EC tablet Take 1 tablet (81 mg) by mouth daily, Disp-90 tablet, R-0, E-Prescribe       atorvastatin (LIPITOR) 20 MG tablet Take 1 tablet (20 mg) by mouth daily, Disp-90 tablet, R-0, E-Prescribe       !! rivaroxaban ANTICOAGULANT (XARELTO) 15 MG TABS tablet Take 1 tablet (15 mg) by mouth 2 times daily (with meals) for 21 days, Disp-42 tablet, R-0, E-Prescribe       !! rivaroxaban ANTICOAGULANT (XARELTO) 20 MG TABS tablet Take 1 tablet (20 mg) by mouth daily (with dinner), Disp-90 tablet, R-0, E-Prescribe        !! - Potential duplicate medications found. Please discuss with provider.           CONTINUE these medications which have NOT CHANGED     Details   EPINEPHrine (ANY BX GENERIC EQUIV) 0.3 MG/0.3ML injection 2-pack Inject 0.3 mLs (0.3 mg) into the muscle as needed for  "anaphylaxis, Disp-2 each, R-1, E-Prescribe       omeprazole (PRILOSEC) 40 MG DR capsule Take 1 capsule (40 mg) by mouth daily, Disp-30 capsule, R-2, E-Prescribe       sucralfate (CARAFATE) 1 GM tablet Take 1 tablet (1 g) by mouth 4 times daily as needed (epigastric pain), Disp-60 tablet, R-1, E-Prescribe     Super B - Vitamin     Problems adhering to non-medication therapy:  Appt not set up yet      Follow-ups Needed After Discharge     Follow-up Appointments     [facility]ult [facility] Follow-up and recommended labs and tests      Please set up an appointment with:  1.  Your primary care doctor within 1 week for hospital follow up.   2.  Cardiology, Dr. Barreto or Quynh Waterman PA-C within 2 weeks   3.  Hematology within one month (referral placed)  4.  Sleep medicine   5.  Podiatry for ongoing foot injections   6.  Gastroenterology for abovementioned scopes     Summary of hospitalization:  [facility] hospital discharge summary reviewed  Diagnostic Tests/Treatments reviewed.  Follow up needed: as above  Other Healthcare Providers Involved in Patient s Care:         Specialist appointment - hematology, cardiology, gastroenterology  Update since discharge: improved.   Post Discharge Medication Reconciliation: discharge medications reconciled, continue medications without change.  Plan of care communicated with patient            Review of Systems   Constitutional, HEENT, cardiovascular, pulmonary, gi and gu systems are negative, except as otherwise noted.      Objective    /74   Pulse 79   Temp 98.3  F (36.8  C) (Tympanic)   Resp 14   Ht 1.956 m (6' 5\")   Wt 136.1 kg (300 lb)   SpO2 97%   BMI 35.57 kg/m    Body mass index is 35.57 kg/m .  Physical Exam   GENERAL: healthy, alert and no distress  EYES: Eyes grossly normal to inspection, PERRL and conjunctivae and sclerae normal  HENT: ear canals and TM's normal, nose and mouth without ulcers or lesions, posterior pharynx with erythematous and swollen tonsils " bilaterally  RESP: lungs clear to auscultation - no rales, rhonchi or wheezes  CV: regular rate and rhythm, normal S1 S2, no S3 or S4, no murmur, click or rub, no peripheral edema   NEURO: Normal strength and tone, mentation intact and speech normal  PSYCH: mentation appears normal, affect normal/bright

## 2021-08-03 ENCOUNTER — OFFICE VISIT (OUTPATIENT)
Dept: CARDIOLOGY | Facility: CLINIC | Age: 41
End: 2021-08-03
Payer: COMMERCIAL

## 2021-08-03 VITALS — WEIGHT: 298 LBS | BODY MASS INDEX: 35.34 KG/M2

## 2021-08-03 DIAGNOSIS — I71.21 ASCENDING AORTIC ANEURYSM (H): ICD-10-CM

## 2021-08-03 DIAGNOSIS — I26.02 ACUTE SADDLE PULMONARY EMBOLISM WITH ACUTE COR PULMONALE (H): Primary | ICD-10-CM

## 2021-08-03 DIAGNOSIS — I77.810 DILATED AORTIC ROOT (H): ICD-10-CM

## 2021-08-03 DIAGNOSIS — I25.118 CORONARY ARTERY DISEASE OF NATIVE ARTERY OF NATIVE HEART WITH STABLE ANGINA PECTORIS (H): ICD-10-CM

## 2021-08-03 DIAGNOSIS — Z86.718 HISTORY OF DEEP VENOUS THROMBOSIS: ICD-10-CM

## 2021-08-03 PROCEDURE — 99214 OFFICE O/P EST MOD 30 MIN: CPT | Performed by: INTERNAL MEDICINE

## 2021-08-03 NOTE — LETTER
"8/3/2021    Deb Peoples, APRN CNP  [address] [facility] [address]    RE: Jace Pratt       Dear Colleague,    I had the pleasure of seeing Jace Pratt in the [facility].        HPI: This is a 40 year old male with past medical history saddle PE after foot surgery requiring catheter-directed lytic at [facility] (3/2019) , hyperlipidemia, hypertension, non-occlusive CAD, depression, anxiety, ascending aortic aneurysm (4.2 cm), former smoker who presented to [facility] ED with severe epigastric abdominal pain (worsening over past 7 days).  CT A/P in ED demonstrated an acute portal vein thrombus for which he was started on a therapeutic heparin infusion and ultimately transitioned to Xarelto. He is here for follow up hospitalization.     Current symptoms resolved, occasional chest pains and shortness of breath. No abdominal pain. He has some LE swelling and pain, painful varicose veins of the right leg. Echocardiogram reviewed and normalized RV function. He has apt with Dr. Callejas in hematology and has labs scheduled he was unaware of, as well repeat CT and abdominal ultrasound.    Family history reviewed. Grandmother  from possible pulmonary embolism and stroke. Father's sister at age of 33 years  in her sleep from possible \"silent heart attack\". Unclear about autopsy results. 3 kids - all healthy.      Socially smoker back in 20s. No significant alcohol use.     On ROS: no lens dislocation, normal eye-width, normal uvula, normal palate, +hypermobility in thumb joints, no skin translucency, normal skin, + hernias, no abnormal wound healing, easy bruising or bleeding, no chest wall deformities, no dental crowding, normal stature.      Cardiac Studies reviewed.    FINAL CONCLUSIONS   Maximal exercise stress test is negative for ischemia.   No diagnostic ECG evidence of ischemia.   The patient had chest discomfort with stress test, " probably non-cardiac.   Good exercise tolerance, achieving 10.1 METs and 92.2% of max predicted heart rate.     CT Chest   IMPRESSION:  1.  Negative for pulmonary embolism. No acute findings in the chest.  2.  Stable mild ectasia of the ascending thoracic aorta measuring 4.2 cm.       ASSESSMENT/PLAN:     #. History of Saddle PE - negative CTPE in 9/2020 at OSH, + family history VTE  #. Right sided varicose veins (deep)   #. Chest pain with negative recent EKG treadmill test but some ST changes on EKG (diffuse)  #. Family history of stroke, myocardial infarction and elevated calcium score on CT   #. Newly diagnosed mildly dilated aorta (4.2 cm) without known aortic disease in family, no significant features to suggest CTD other than thumb joint hypermobility   #. Newly diagnosed portal vein thrombus in absence of anticoagulation therapy  #. Non obstructive CAD      Plan:      1. Repeat vein competancy study to evaluate for DVT, superficial and deep reflux, iliac vein compression syndrome  2. Restarted xarelto 20 mg daily   3.  S/p treatmill nuclear stress test where had a inferior and inferolateral defect that was consistent with infarct and an EF of 47&.  TTE afterwards showed a normal EF but dilated IVC and borderline RV enlargement.  S/p coronary angio 12/2020 that showed just a 10% proximal and mid-LAD lesion and a 10% proximal to mid RCA lesion - there was no intervention. Continue aggressive risk factor modification. On imdur, lisinopril, aspirin and statin.  4. Echocardiogram every 1-2 years   5. Follow up in 1 year with me        PAST MEDICAL HISTORY  Past Medical History:   Diagnosis Date     Acute saddle pulmonary embolism with acute cor pulmonale (H)      Anxiety      Ascending aortic aneurysm (H)      CAD (coronary artery disease) 12/01/2020    Mild nonobstructive     Chest pain      Depression      ANTON (dyspnea on exertion)      Former smoker      Neuromuscular disorder (H)      S/P coronary angiogram  12/01/2020    No intervention     Stable angina (H)      Varicose veins of right lower extremity with edema        CURRENT MEDICATIONS  Current Outpatient Medications   Medication Sig Dispense Refill     aspirin (ASA) 81 MG EC tablet Take 1 tablet (81 mg) by mouth daily 90 tablet 0     atorvastatin (LIPITOR) 20 MG tablet Take 1 tablet (20 mg) by mouth daily 90 tablet 0     EPINEPHrine (ANY BX GENERIC EQUIV) 0.3 MG/0.3ML injection 2-pack Inject 0.3 mLs (0.3 mg) into the muscle as needed for anaphylaxis 2 each 1     Lidocaine (LIDOCARE) 4 % Patch Place 1 patch onto the skin every 24 hours To prevent lidocaine toxicity, patient should be patch free for 12 hrs daily.       lisinopril (ZESTRIL) 5 MG tablet Take 1 tablet (5 mg) by mouth daily 90 tablet 0     rivaroxaban ANTICOAGULANT (XARELTO) 20 MG TABS tablet Take 1 tablet (20 mg) by mouth daily (with dinner) 90 tablet 0     rivaroxaban ANTICOAGULANT (XARELTO) 15 MG TABS tablet Take 1 tablet (15 mg) by mouth 2 times daily (with meals) for 21 days 42 tablet 0       PAST SURGICAL HISTORY:  Past Surgical History:   Procedure Laterality Date     ANKLE SURGERY Right 2019    CMT     APPENDECTOMY  2007     CHOLECYSTECTOMY  2008     CV HEART CATHETERIZATION WITH POSSIBLE INTERVENTION N/A 12/1/2020    Procedure: Heart Catheterization with Possible Intervention;  Surgeon: Nurys Lopez MD;  Location:  HEART CARDIAC CATH LAB     CV LEFT HEART CATH N/A 12/1/2020    Procedure: Left Heart Cath;  Surgeon: Nurys Lopez MD;  Location:  HEART CARDIAC CATH LAB       ALLERGIES     Allergies   Allergen Reactions     Bee Pollen Anaphylaxis     Other reaction(s): Unknown Reaction     Cefazolin Hives     Other reaction(s): Hives     Azithromycin Nausea and Vomiting     Latex Hives     Morphine Hives     Penicillins Nausea and Vomiting     Bupropion Other (See Comments)     Made him feel suicidal  Made him feel suicidal         FAMILY HISTORY  Family History   Problem Relation Age  of Onset     Depression Mother      Depression Father      Clotting Disorder Maternal Grandmother      Depression Brother            SOCIAL HISTORY  Social History     Socioeconomic History     Marital status:      Spouse name: Not on file     Number of children: Not on file     Years of education: Not on file     Highest education level: Not on file   Occupational History     Not on file   Tobacco Use     Smoking status: Former Smoker     Types: Cigarettes     Smokeless tobacco: Former User     Quit date: 2019   Substance and Sexual Activity     Alcohol use: Yes     Comment: A glass a wine a week      Drug use: Never     Sexual activity: Not on file   Other Topics Concern     Parent/sibling w/ CABG, MI or angioplasty before 65F 55M? No   Social History Narrative     Not on file     Social Determinants of Health     Financial Resource Strain:      Difficulty of Paying Living Expenses:    Food Insecurity:      Worried About Running Out of Food in the Last Year:      Ran Out of Food in the Last Year:    Transportation Needs:      Lack of Transportation (Medical):      Lack of Transportation (Non-Medical):    Physical Activity:      Days of Exercise per Week:      Minutes of Exercise per Session:    Stress:      Feeling of Stress :    Social Connections:      Frequency of Communication with Friends and Family:      Frequency of Social Gatherings with Friends and Family:      Attends Judaism Services:      Active Member of Clubs or Organizations:      Attends Club or Organization Meetings:      Marital Status:    Intimate Partner Violence:      Fear of Current or Ex-Partner:      Emotionally Abused:      Physically Abused:      Sexually Abused:        ROS:   Constitutional: No fever, chills, or sweats. No weight gain/loss   ENT: No visual disturbance, ear ache, epistaxis, sore throat  Allergies/Immunologic: Negative  Respiratory: No cough, hemoptysia  Cardiovascular: As per HPI  GI: No nausea, vomiting,  hematemesis, melena, or hematochezia  : No urinary frequency, dysuria, or hematuria  Integument: Negative  Psychiatric: Negative  Neuro: Negative  Endocrinology: Negative   Musculoskeletal: Negative  Vascular: No walking impairment, claudication, ischemic rest pain or nonhealing wounds    EXAM:  Wt 135.2 kg (298 lb)   BMI 35.34 kg/m    In general, the patient is a pleasant male in no apparent distress.    HEENT: NC/AT.  PERRLA.  EOMI.  Sclerae white, not injected.   Neck: No adenopathy.  No thyromegaly. Carotids +2/2 bilaterally without bruits.  No jugular venous distension.   Heart: RRR. Normal S1, S2 splits physiologically. No murmur, rub, click, or gallop.  Lungs: CTA.  No ronchi, wheezes, rales.  No dullness to percussion.   Abdomen: Soft, nontender, nondistended  Extremities: No clubbing, cyanosis +CVI and edema BL, right more than left.  Vascular: No bruits are noted.    Labs:  LIPID RESULTS:  Lab Results   Component Value Date    CHOL 176 07/07/2021    HDL 45 07/07/2021     (H) 07/07/2021    TRIG 106 07/07/2021    NHDL 131 (H) 07/07/2021       LIVER ENZYME RESULTS:  Lab Results   Component Value Date    AST 24 07/09/2021    ALT 57 07/09/2021       CBC RESULTS:  Lab Results   Component Value Date    WBC 7.1 07/09/2021    RBC 5.53 07/09/2021    HGB 16.1 07/09/2021    HCT 47.9 07/09/2021    MCV 87 07/09/2021    MCH 29.1 07/09/2021    MCHC 33.6 07/09/2021    RDW 13.4 07/09/2021     07/09/2021       BMP RESULTS:  Lab Results   Component Value Date     07/09/2021    POTASSIUM 3.8 07/09/2021    CHLORIDE 108 07/09/2021    CO2 23 07/09/2021    ANIONGAP 6 07/09/2021    GLC 98 07/09/2021    BUN 15 07/09/2021    CR 1.01 07/09/2021    GFRESTIMATED >90 07/09/2021    GFRESTBLACK >90 07/09/2021    JANIE 8.8 07/09/2021        A1C RESULTS:  No results found for: A1C        Thank you for allowing me to participate in the care of your patient.      Sincerely,     Moni Barreto MD     [facility]  [facility]  cc:   No referring provider defined for this encounter.

## 2021-08-03 NOTE — PROGRESS NOTES
"    HPI: This is a 40 year old male with past medical history saddle PE after foot surgery requiring catheter-directed lytic at [facility] (3/2019) , hyperlipidemia, hypertension, non-occlusive CAD, depression, anxiety, ascending aortic aneurysm (4.2 cm), former smoker who presented to [facility] ED with severe epigastric abdominal pain (worsening over past 7 days).  CT A/P in ED demonstrated an acute portal vein thrombus for which he was started on a therapeutic heparin infusion and ultimately transitioned to Xarelto. He is here for follow up hospitalization.     Current symptoms resolved, occasional chest pains and shortness of breath. No abdominal pain. He has some LE swelling and pain, painful varicose veins of the right leg. Echocardiogram reviewed and normalized RV function. He has apt with Dr. Callejas in hematology and has labs scheduled he was unaware of, as well repeat CT and abdominal ultrasound.    Family history reviewed. Grandmother  from possible pulmonary embolism and stroke. Father's sister at age of 33 years  in her sleep from possible \"silent heart attack\". Unclear about autopsy results. 3 kids - all healthy.      Socially smoker back in 20s. No significant alcohol use.     On ROS: no lens dislocation, normal eye-width, normal uvula, normal palate, +hypermobility in thumb joints, no skin translucency, normal skin, + hernias, no abnormal wound healing, easy bruising or bleeding, no chest wall deformities, no dental crowding, normal stature.      Cardiac Studies reviewed.    FINAL CONCLUSIONS   Maximal exercise stress test is negative for ischemia.   No diagnostic ECG evidence of ischemia.   The patient had chest discomfort with stress test, probably non-cardiac.   Good exercise tolerance, achieving 10.1 METs and 92.2% of max predicted heart rate.     CT Chest   IMPRESSION:  1.  Negative for pulmonary embolism. No acute findings in the chest.  2.  Stable mild ectasia of the ascending thoracic " aorta measuring 4.2 cm.       ASSESSMENT/PLAN:     #. History of Saddle PE - negative CTPE in 9/2020 at OSH, + family history VTE  #. Right sided varicose veins (deep)   #. Chest pain with negative recent EKG treadmill test but some ST changes on EKG (diffuse)  #. Family history of stroke, myocardial infarction and elevated calcium score on CT   #. Newly diagnosed mildly dilated aorta (4.2 cm) without known aortic disease in family, no significant features to suggest CTD other than thumb joint hypermobility   #. Newly diagnosed portal vein thrombus in absence of anticoagulation therapy  #. Non obstructive CAD      Plan:      1. Repeat vein competancy study to evaluate for DVT, superficial and deep reflux, iliac vein compression syndrome  2. Restarted xarelto 20 mg daily   3.  S/p treatmill nuclear stress test where had a inferior and inferolateral defect that was consistent with infarct and an EF of 47&.  TTE afterwards showed a normal EF but dilated IVC and borderline RV enlargement.  S/p coronary angio 12/2020 that showed just a 10% proximal and mid-LAD lesion and a 10% proximal to mid RCA lesion - there was no intervention. Continue aggressive risk factor modification. On imdur, lisinopril, aspirin and statin.  4. Echocardiogram every 1-2 years   5. Follow up in 1 year with me        PAST MEDICAL HISTORY  Past Medical History:   Diagnosis Date     Acute saddle pulmonary embolism with acute cor pulmonale (H)      Anxiety      Ascending aortic aneurysm (H)      CAD (coronary artery disease) 12/01/2020    Mild nonobstructive     Chest pain      Depression      ANTON (dyspnea on exertion)      Former smoker      Neuromuscular disorder (H)      S/P coronary angiogram 12/01/2020    No intervention     Stable angina (H)      Varicose veins of right lower extremity with edema        CURRENT MEDICATIONS  Current Outpatient Medications   Medication Sig Dispense Refill     aspirin (ASA) 81 MG EC tablet Take 1 tablet (81 mg)  by mouth daily 90 tablet 0     atorvastatin (LIPITOR) 20 MG tablet Take 1 tablet (20 mg) by mouth daily 90 tablet 0     EPINEPHrine (ANY BX GENERIC EQUIV) 0.3 MG/0.3ML injection 2-pack Inject 0.3 mLs (0.3 mg) into the muscle as needed for anaphylaxis 2 each 1     Lidocaine (LIDOCARE) 4 % Patch Place 1 patch onto the skin every 24 hours To prevent lidocaine toxicity, patient should be patch free for 12 hrs daily.       lisinopril (ZESTRIL) 5 MG tablet Take 1 tablet (5 mg) by mouth daily 90 tablet 0     rivaroxaban ANTICOAGULANT (XARELTO) 20 MG TABS tablet Take 1 tablet (20 mg) by mouth daily (with dinner) 90 tablet 0     rivaroxaban ANTICOAGULANT (XARELTO) 15 MG TABS tablet Take 1 tablet (15 mg) by mouth 2 times daily (with meals) for 21 days 42 tablet 0       PAST SURGICAL HISTORY:  Past Surgical History:   Procedure Laterality Date     ANKLE SURGERY Right 2019    CMT     APPENDECTOMY  2007     CHOLECYSTECTOMY  2008     CV HEART CATHETERIZATION WITH POSSIBLE INTERVENTION N/A 12/1/2020    Procedure: Heart Catheterization with Possible Intervention;  Surgeon: Nurys Lopez MD;  Location:  HEART CARDIAC CATH LAB     CV LEFT HEART CATH N/A 12/1/2020    Procedure: Left Heart Cath;  Surgeon: Nurys Lopez MD;  Location:  HEART CARDIAC CATH LAB       ALLERGIES     Allergies   Allergen Reactions     Bee Pollen Anaphylaxis     Other reaction(s): Unknown Reaction     Cefazolin Hives     Other reaction(s): Hives     Azithromycin Nausea and Vomiting     Latex Hives     Morphine Hives     Penicillins Nausea and Vomiting     Bupropion Other (See Comments)     Made him feel suicidal  Made him feel suicidal         FAMILY HISTORY  Family History   Problem Relation Age of Onset     Depression Mother      Depression Father      Clotting Disorder Maternal Grandmother      Depression Brother            SOCIAL HISTORY  Social History     Socioeconomic History     Marital status:      Spouse name: Not on file      Number of children: Not on file     Years of education: Not on file     Highest education level: Not on file   Occupational History     Not on file   Tobacco Use     Smoking status: Former Smoker     Types: Cigarettes     Smokeless tobacco: Former User     Quit date: 2019   Substance and Sexual Activity     Alcohol use: Yes     Comment: A glass a wine a week      Drug use: Never     Sexual activity: Not on file   Other Topics Concern     Parent/sibling w/ CABG, MI or angioplasty before 65F 55M? No   Social History Narrative     Not on file     Social Determinants of Health     Financial Resource Strain:      Difficulty of Paying Living Expenses:    Food Insecurity:      Worried About Running Out of Food in the Last Year:      Ran Out of Food in the Last Year:    Transportation Needs:      Lack of Transportation (Medical):      Lack of Transportation (Non-Medical):    Physical Activity:      Days of Exercise per Week:      Minutes of Exercise per Session:    Stress:      Feeling of Stress :    Social Connections:      Frequency of Communication with Friends and Family:      Frequency of Social Gatherings with Friends and Family:      Attends Gnosticist Services:      Active Member of Clubs or Organizations:      Attends Club or Organization Meetings:      Marital Status:    Intimate Partner Violence:      Fear of Current or Ex-Partner:      Emotionally Abused:      Physically Abused:      Sexually Abused:        ROS:   Constitutional: No fever, chills, or sweats. No weight gain/loss   ENT: No visual disturbance, ear ache, epistaxis, sore throat  Allergies/Immunologic: Negative  Respiratory: No cough, hemoptysia  Cardiovascular: As per HPI  GI: No nausea, vomiting, hematemesis, melena, or hematochezia  : No urinary frequency, dysuria, or hematuria  Integument: Negative  Psychiatric: Negative  Neuro: Negative  Endocrinology: Negative   Musculoskeletal: Negative  Vascular: No walking impairment, claudication, ischemic  rest pain or nonhealing wounds    EXAM:  Wt 135.2 kg (298 lb)   BMI 35.34 kg/m    In general, the patient is a pleasant male in no apparent distress.    HEENT: NC/AT.  PERRLA.  EOMI.  Sclerae white, not injected.   Neck: No adenopathy.  No thyromegaly. Carotids +2/2 bilaterally without bruits.  No jugular venous distension.   Heart: RRR. Normal S1, S2 splits physiologically. No murmur, rub, click, or gallop.  Lungs: CTA.  No ronchi, wheezes, rales.  No dullness to percussion.   Abdomen: Soft, nontender, nondistended  Extremities: No clubbing, cyanosis +CVI and edema BL, right more than left.  Vascular: No bruits are noted.    Labs:  LIPID RESULTS:  Lab Results   Component Value Date    CHOL 176 07/07/2021    HDL 45 07/07/2021     (H) 07/07/2021    TRIG 106 07/07/2021    NHDL 131 (H) 07/07/2021       LIVER ENZYME RESULTS:  Lab Results   Component Value Date    AST 24 07/09/2021    ALT 57 07/09/2021       CBC RESULTS:  Lab Results   Component Value Date    WBC 7.1 07/09/2021    RBC 5.53 07/09/2021    HGB 16.1 07/09/2021    HCT 47.9 07/09/2021    MCV 87 07/09/2021    MCH 29.1 07/09/2021    MCHC 33.6 07/09/2021    RDW 13.4 07/09/2021     07/09/2021       BMP RESULTS:  Lab Results   Component Value Date     07/09/2021    POTASSIUM 3.8 07/09/2021    CHLORIDE 108 07/09/2021    CO2 23 07/09/2021    ANIONGAP 6 07/09/2021    GLC 98 07/09/2021    BUN 15 07/09/2021    CR 1.01 07/09/2021    GFRESTIMATED >90 07/09/2021    GFRESTBLACK >90 07/09/2021    JANIE 8.8 07/09/2021        A1C RESULTS:  No results found for: A1C

## 2021-08-03 NOTE — PATIENT INSTRUCTIONS
1. Vein competency study   2. Follow up with Dr. Callejas, make sure labs and imaging are done beforehand  3. Follow up with Dr. Barreto in one year

## 2021-08-18 ENCOUNTER — ANCILLARY PROCEDURE (OUTPATIENT)
Dept: VASCULAR ULTRASOUND | Facility: CLINIC | Age: 41
End: 2021-08-18
Attending: INTERNAL MEDICINE
Payer: COMMERCIAL

## 2021-08-18 ENCOUNTER — CARE COORDINATION (OUTPATIENT)
Dept: CARDIOLOGY | Facility: CLINIC | Age: 41
End: 2021-08-18

## 2021-08-18 DIAGNOSIS — I87.2 VENOUS INSUFFICIENCY: ICD-10-CM

## 2021-08-18 DIAGNOSIS — Z86.718 HISTORY OF DEEP VENOUS THROMBOSIS: ICD-10-CM

## 2021-08-18 DIAGNOSIS — I83.893 VARICOSE VEINS OF BILATERAL LOWER EXTREMITIES WITH OTHER COMPLICATIONS: Primary | ICD-10-CM

## 2021-08-18 DIAGNOSIS — I83.811 VARICOSE VEINS OF RIGHT LOWER EXTREMITY WITH PAIN: ICD-10-CM

## 2021-08-18 PROCEDURE — 93970 EXTREMITY STUDY: CPT | Performed by: INTERNAL MEDICINE

## 2021-08-18 NOTE — PROGRESS NOTES
Venous Comp US 8/18/21:  Impression:  1. Right leg: No DVT. Severe common femoral vein reflux 4 seconds,  very severe right greater saphenous vein reflux up to 8.2 seconds with  8.2 mm vessel diameter. There is also  vein reflux  contributing to superficial tributaries with severe reflux 3.4 seconds  with varicose veins ranging from 7-11 mm.     2. Left leg: No DVT. 1.7 seconds common femoral vein reflux. Moderate  2.1 second reflux in the superficially running greater saphenous vein  measuring 5.3 mm.     Compared to previous study 11/2020, the right greater saphenous vein  reflux has worsened.      If clinically indicated, patient would be a candidate for right  greater saphenous vein radiofrequency ablation with radiofrequency  ablation of right  with sclerotherapy and phlebectomy of  calf varicose veins.     Patient also could be a candidate for left leg radiofrequency ablation  with sclerotherapy, the superficial greater saphenous vein would make  this somewhat more technically challenging. For this leg, intervention  would be considered if significant symptoms not amenable to  conservative therapy.      Last OV 8/3/21 w/ Dr. Barreto:     ASSESSMENT/PLAN:     #. History of Saddle PE - negative CTPE in 9/2020 at OSH, + family history VTE  #. Right sided varicose veins (deep)   #. Chest pain with negative recent EKG treadmill test but some ST changes on EKG (diffuse)  #. Family history of stroke, myocardial infarction and elevated calcium score on CT   #. Newly diagnosed mildly dilated aorta (4.2 cm) without known aortic disease in family, no significant features to suggest CTD other than thumb joint hypermobility   #. Newly diagnosed portal vein thrombus in absence of anticoagulation therapy  #. Non obstructive CAD      Plan:      1. Repeat vein competancy study to evaluate for DVT, superficial and deep reflux, iliac vein compression syndrome  2. Restarted xarelto 20 mg daily   3.  S/p  treatmill nuclear stress test where had a inferior and inferolateral defect that was consistent with infarct and an EF of 47&.  TTE afterwards showed a normal EF but dilated IVC and borderline RV enlargement.  S/p coronary angio 12/2020 that showed just a 10% proximal and mid-LAD lesion and a 10% proximal to mid RCA lesion - there was no intervention. Continue aggressive risk factor modification. On imdur, lisinopril, aspirin and statin.  4. Echocardiogram every 1-2 years   5. Follow up in 1 year with murali King RN August 18, 2021 4:10 PM

## 2021-08-24 NOTE — PROGRESS NOTES
RN placed orders for procedures. RN called patient and left VM requesting patient to call back to review Dr. Belcher's recommendations below.       Dr. Belcher's recommendations     I am the one that read the venous competency test and I put recommendations in the report as follows     If clinically indicated, patient would be a candidate for right   greater saphenous vein radiofrequency ablation with radiofrequency   ablation of right  with sclerotherapy and phlebectomy of   calf varicose veins.       Patient also could be a candidate for left leg radiofrequency ablation   with sclerotherapy

## 2021-09-01 ENCOUNTER — HOSPITAL ENCOUNTER (EMERGENCY)
Facility: CLINIC | Age: 41
Discharge: HOME | End: 2021-09-01
Attending: PHYSICIAN ASSISTANT | Admitting: PHYSICIAN ASSISTANT
Payer: COMMERCIAL

## 2021-09-01 ENCOUNTER — APPOINTMENT (OUTPATIENT)
Dept: GENERAL RADIOLOGY | Facility: CLINIC | Age: 41
End: 2021-09-01
Attending: EMERGENCY MEDICINE
Payer: COMMERCIAL

## 2021-09-01 ENCOUNTER — APPOINTMENT (OUTPATIENT)
Dept: CT IMAGING | Facility: CLINIC | Age: 41
End: 2021-09-01
Attending: PHYSICIAN ASSISTANT
Payer: COMMERCIAL

## 2021-09-01 VITALS
OXYGEN SATURATION: 97 % | TEMPERATURE: 98.1 F | DIASTOLIC BLOOD PRESSURE: 83 MMHG | HEART RATE: 60 BPM | SYSTOLIC BLOOD PRESSURE: 138 MMHG | RESPIRATION RATE: 20 BRPM

## 2021-09-01 DIAGNOSIS — K21.9 GERD (GASTROESOPHAGEAL REFLUX DISEASE): ICD-10-CM

## 2021-09-01 DIAGNOSIS — R10.13 EPIGASTRIC PAIN: ICD-10-CM

## 2021-09-01 DIAGNOSIS — R07.9 CHEST PAIN: ICD-10-CM

## 2021-09-01 DIAGNOSIS — K43.9 SPIGELIAN HERNIA: ICD-10-CM

## 2021-09-01 LAB
ALBUMIN SERPL-MCNC: 3.8 G/DL (ref 3.4–5)
ALP SERPL-CCNC: 68 U/L (ref 40–150)
ALT SERPL W P-5'-P-CCNC: 65 U/L (ref 0–70)
ANION GAP SERPL CALCULATED.3IONS-SCNC: 4 MMOL/L (ref 3–14)
AST SERPL W P-5'-P-CCNC: 38 U/L (ref 0–45)
BASOPHILS # BLD AUTO: 0 10E3/UL (ref 0–0.2)
BASOPHILS NFR BLD AUTO: 0 %
BILIRUB SERPL-MCNC: 0.7 MG/DL (ref 0.2–1.3)
BUN SERPL-MCNC: 18 MG/DL (ref 7–30)
CALCIUM SERPL-MCNC: 8.9 MG/DL (ref 8.5–10.1)
CHLORIDE BLD-SCNC: 107 MMOL/L (ref 94–109)
CO2 SERPL-SCNC: 28 MMOL/L (ref 20–32)
CREAT SERPL-MCNC: 1.22 MG/DL (ref 0.66–1.25)
D DIMER PPP FEU-MCNC: 0.34 UG/ML FEU (ref 0–0.5)
EOSINOPHIL # BLD AUTO: 0.2 10E3/UL (ref 0–0.7)
EOSINOPHIL NFR BLD AUTO: 3 %
ERYTHROCYTE [DISTWIDTH] IN BLOOD BY AUTOMATED COUNT: 13.2 % (ref 10–15)
GFR SERPL CREATININE-BSD FRML MDRD: 74 ML/MIN/1.73M2
GLUCOSE BLD-MCNC: 113 MG/DL (ref 70–99)
HCT VFR BLD AUTO: 48.7 % (ref 40–53)
HGB BLD-MCNC: 15.9 G/DL (ref 13.3–17.7)
HOLD SPECIMEN: NORMAL
HOLD SPECIMEN: NORMAL
IMM GRANULOCYTES # BLD: 0 10E3/UL
IMM GRANULOCYTES NFR BLD: 0 %
LIPASE SERPL-CCNC: 140 U/L (ref 73–393)
LYMPHOCYTES # BLD AUTO: 2.4 10E3/UL (ref 0.8–5.3)
LYMPHOCYTES NFR BLD AUTO: 33 %
MCH RBC QN AUTO: 28.5 PG (ref 26.5–33)
MCHC RBC AUTO-ENTMCNC: 32.6 G/DL (ref 31.5–36.5)
MCV RBC AUTO: 87 FL (ref 78–100)
MONOCYTES # BLD AUTO: 0.7 10E3/UL (ref 0–1.3)
MONOCYTES NFR BLD AUTO: 9 %
NEUTROPHILS # BLD AUTO: 4 10E3/UL (ref 1.6–8.3)
NEUTROPHILS NFR BLD AUTO: 55 %
NRBC # BLD AUTO: 0 10E3/UL
NRBC BLD AUTO-RTO: 0 /100
PLATELET # BLD AUTO: 216 10E3/UL (ref 150–450)
POTASSIUM BLD-SCNC: 4.4 MMOL/L (ref 3.4–5.3)
PROT SERPL-MCNC: 7.5 G/DL (ref 6.8–8.8)
RBC # BLD AUTO: 5.57 10E6/UL (ref 4.4–5.9)
SODIUM SERPL-SCNC: 139 MMOL/L (ref 133–144)
TROPONIN I SERPL-MCNC: <0.015 UG/L (ref 0–0.04)
WBC # BLD AUTO: 7.2 10E3/UL (ref 4–11)

## 2021-09-01 PROCEDURE — 74177 CT ABD & PELVIS W/CONTRAST: CPT

## 2021-09-01 PROCEDURE — 85379 FIBRIN DEGRADATION QUANT: CPT | Performed by: EMERGENCY MEDICINE

## 2021-09-01 PROCEDURE — 85379 FIBRIN DEGRADATION QUANT: CPT | Performed by: PHYSICIAN ASSISTANT

## 2021-09-01 PROCEDURE — 82040 ASSAY OF SERUM ALBUMIN: CPT | Performed by: EMERGENCY MEDICINE

## 2021-09-01 PROCEDURE — 99285 EMERGENCY DEPT VISIT HI MDM: CPT | Mod: 25

## 2021-09-01 PROCEDURE — 85025 COMPLETE CBC W/AUTO DIFF WBC: CPT | Performed by: PHYSICIAN ASSISTANT

## 2021-09-01 PROCEDURE — 80053 COMPREHEN METABOLIC PANEL: CPT | Performed by: PHYSICIAN ASSISTANT

## 2021-09-01 PROCEDURE — 250N000011 HC RX IP 250 OP 636: Performed by: PHYSICIAN ASSISTANT

## 2021-09-01 PROCEDURE — 250N000009 HC RX 250: Performed by: PHYSICIAN ASSISTANT

## 2021-09-01 PROCEDURE — 93005 ELECTROCARDIOGRAM TRACING: CPT

## 2021-09-01 PROCEDURE — 84484 ASSAY OF TROPONIN QUANT: CPT | Performed by: PHYSICIAN ASSISTANT

## 2021-09-01 PROCEDURE — 71046 X-RAY EXAM CHEST 2 VIEWS: CPT

## 2021-09-01 PROCEDURE — 85025 COMPLETE CBC W/AUTO DIFF WBC: CPT | Performed by: EMERGENCY MEDICINE

## 2021-09-01 PROCEDURE — 83690 ASSAY OF LIPASE: CPT | Performed by: PHYSICIAN ASSISTANT

## 2021-09-01 PROCEDURE — 96374 THER/PROPH/DIAG INJ IV PUSH: CPT | Mod: 59

## 2021-09-01 PROCEDURE — 36415 COLL VENOUS BLD VENIPUNCTURE: CPT | Performed by: EMERGENCY MEDICINE

## 2021-09-01 RX ORDER — IOPAMIDOL 755 MG/ML
500 INJECTION, SOLUTION INTRAVASCULAR ONCE
Status: COMPLETED | OUTPATIENT
Start: 2021-09-01 | End: 2021-09-01

## 2021-09-01 RX ADMIN — IOPAMIDOL 90 ML: 755 INJECTION, SOLUTION INTRAVENOUS at 22:30

## 2021-09-01 RX ADMIN — FAMOTIDINE 20 MG: 10 INJECTION, SOLUTION INTRAVENOUS at 21:42

## 2021-09-02 LAB
ATRIAL RATE - MUSE: 61 BPM
DIASTOLIC BLOOD PRESSURE - MUSE: NORMAL MMHG
INTERPRETATION ECG - MUSE: NORMAL
P AXIS - MUSE: 32 DEGREES
PR INTERVAL - MUSE: 152 MS
QRS DURATION - MUSE: 90 MS
QT - MUSE: 392 MS
QTC - MUSE: 394 MS
R AXIS - MUSE: -1 DEGREES
SYSTOLIC BLOOD PRESSURE - MUSE: NORMAL MMHG
T AXIS - MUSE: 14 DEGREES
VENTRICULAR RATE- MUSE: 61 BPM

## 2021-09-02 NOTE — ED TRIAGE NOTES
L side Chest pain developed 2 days ago, tingling to L underarm. Pain worse on expiration.     Hx descending aneurysm     Took ibuprofen at 1630

## 2021-09-02 NOTE — DISCHARGE INSTRUCTIONS
Discharge Instructions  Chest Pain    You have been seen today for chest pain or discomfort.  At this time, your provider has found no signs that your chest pain is due to a serious or life-threatening condition, (or you have declined more testing and/or admission to the hospital). However, sometimes there is a serious problem that does not show up right away. Your evaluation today may not be complete and you may need further testing and evaluation.     Generally, every Emergency Department visit should have a follow-up clinic visit with either a primary or a specialty clinic/provider. Please follow-up as instructed by your emergency provider today.  Return to the Emergency Department if:  Your chest pain changes, gets worse, starts to happen more often, or comes with less activity.  You are newly short of breath.  You get very weak or tired.  You pass out or faint.  You have any new symptoms, like fever, cough, numb legs, or you cough up blood.  You have anything else that worries you.    Until you follow-up with your regular provider, please do the following:  Take one aspirin daily unless you have an allergy or are told not to by your provider.  If a stress test appointment has been made, go to the appointment.  If you have questions, contact your regular provider.  Follow-up with your regular provider/clinic as directed; this is very important.    If you were given a prescription for medicine here today, be sure to read all of the information (including the package insert) that comes with your prescription.  This will include important information about the medicine, its side effects, and any warnings that you need to know about.  The pharmacist who fills the prescription can provide more information and answer questions you may have about the medicine.  If you have questions or concerns that the pharmacist cannot address, please call or return to the Emergency Department.       Remember that you can always come  back to the Emergency Department if you are not able to see your regular provider in the amount of time listed above, if you get any new symptoms, or if there is anything that worries you.  Discharge Instructions  Abdominal Pain    Abdominal pain (belly pain) can be caused by many things. Your evaluation today does not show the exact cause for your pain. Your provider today has decided that it is unlikely your pain is due to a life threatening problem, or a problem requiring surgery or hospital admission. Sometimes those problems cannot be found right away, so it is very important that you follow up as directed.  Sometimes only the changes which occur over time allow the cause of your pain to be found.    Generally, every Emergency Department visit should have a follow-up clinic visit with either a primary or a specialty clinic/provider. Please follow-up as instructed by your emergency provider today. With abdominal pain, we often recommend very close follow-up, such as the following day.    ADULTS:  Return to the Emergency Department right away if:    You get an oral temperature above 102oF or as directed by your provider.  You have blood in your stools. This may be bright red or appear as black, tarry stools.    You keep vomiting (throwing up) or cannot drink liquids.  You see blood when you vomit.   You cannot have a bowel movement or you cannot pass gas.  Your stomach gets bloated or bigger.  Your skin or the whites of your eyes look yellow.  You faint.  You have bloody, frequent or painful urination (peeing).  You have new symptoms or anything that worries you.    CHILDREN:  Return to the Emergency Department right away if your child has any of the above-listed symptoms or the following:    Pushes your hand away or screams/cries when his/her belly is touched.  You notice your child is very fussy or weak.  Your child is very tired and is too tired to eat or drink.  Your child is dehydrated.  Signs of dehydration  can be:  Significant change in the amount of wet diapers/urine.  Your infant or child starts to have dry mouth and lips, or no saliva (spit) or tears.    PREGNANT WOMEN:  Return to the Emergency Department right away if you have any of the above-listed symptoms or the following:    You have bleeding, leaking fluid or passing tissue from the vagina.  You have worse pain or cramping, or pain in your shoulder or back.  You have vomiting that will not stop.  You have a temperature of 100oF or more.  Your baby is not moving as much as usual.  You faint.  You get a bad headache with or without eye problems and abdominal pain.  You have a seizure.  You have unusual discharge from your vagina and abdominal pain.    Abdominal pain is pretty common during pregnancy.  Your pain may or may not be related to your pregnancy. You should follow-up closely with your OB provider so they can evaluate you and your baby.  Until you follow-up with your regular provider, do the following:     Avoid sex and do not put anything in your vagina.  Drink clear fluids.  Only take medications approved by your provider.    MORE INFORMATION:    Appendicitis:  A possible cause of abdominal pain in any person who still has their appendix is acute appendicitis. Appendicitis is often hard to diagnose.  Testing does not always rule out early appendicitis or other causes of abdominal pain. Close follow-up with your provider and re-evaluations may be needed to figure out the reason for your abdominal pain.    Follow-up:  It is very important that you make an appointment with your clinic and go to the appointment.  If you do not follow-up with your primary provider, it may result in missing an important development which could result in permanent injury or disability and/or lasting pain.  If there is any problem keeping your appointment, call your provider or return to the Emergency Department.    Medications:  Take your medications as directed by your  "provider today.  Before using over-the-counter medications, ask your provider and make sure to take the medications as directed.  If you have any questions about medications, ask your provider.    Diet:  Resume your normal diet as much as possible, but do not eat fried, fatty or spicy foods while you have pain.  Do not drink alcohol or have caffeine.  Do not smoke tobacco.    Probiotics: If you have been given an antibiotic, you may want to also take a probiotic pill or eat yogurt with live cultures. Probiotics have \"good bacteria\" to help your intestines stay healthy. Studies have shown that probiotics help prevent diarrhea (loose stools) and other intestine problems (including C. diff infection) when you take antibiotics. You can buy these without a prescription in the pharmacy section of the store.     If you were given a prescription for medicine here today, be sure to read all of the information (including the package insert) that comes with your prescription.  This will include important information about the medicine, its side effects, and any warnings that you need to know about.  The pharmacist who fills the prescription can provide more information and answer questions you may have about the medicine.  If you have questions or concerns that the pharmacist cannot address, please call or return to the Emergency Department.       Remember that you can always come back to the Emergency Department if you are not able to see your regular provider in the amount of time listed above, if you get any new symptoms, or if there is anything that worries you.    "

## 2021-09-02 NOTE — ED PROVIDER NOTES
History   Chief Complaint:  Chest Pain       HPI   Jace Pratt is a 40 year old male on Xarelto with history of prior pulmonary embolism, ascending aortic aneurysm, anxiety, portal vein thrombosis, cholecystectomy among others who presents with chest and epigastric pain.  The patient states the pain is intermittent and has been present for some time but worsened over the last 2 days.  He occasionally has tingling to his left underarm but does not otherwise radiate.  It is worse with expiration.  He has not had any vomiting or fever or cough.  He is on Xarelto and denies any missed doses.  No leg pain or leg swelling.  He underwent left heart catheterization in December 2020 which showed only very minimal 10% LAD and RCA stenosis.    Review of Systems   All other systems reviewed and are negative.    Allergies:  Bee Pollen  Cefazolin  Azithromycin  Latex  Morphine  Penicillins  Bupropion    Medications:  Imdur  Albuterol  Prinivil  Epipen  Lipitor  Aspirin 81 mg  Xarelto    Past Medical History:    Acute saddle pulmonary embolism  Anxiety  Ascending aortic aneurysm  CAD  Depression  Neuromuscular disorder  Stable angina  Portal vein thrombosis  Muscular dystrophy    Past Surgical History:    Ankle surgery  Appendectomy  Cholecystectomy  Heart catheterization   Left heart cath  Hernia repair  Wrist surgery  Left tricep tendon repair  Colonoscopy  Right foot endoscopic plantar fasciotomy    Family History:    Depression    Social History:  Patient presents to the ED self.  Non-smoker  No IV drug use  Physical Exam     Patient Vitals for the past 24 hrs:   BP Temp Pulse Resp SpO2   09/01/21 2337 -- -- -- -- 97 %   09/01/21 2336 138/83 -- 60 -- 98 %   09/01/21 1947 (!) 146/94 98.1  F (36.7  C) 90 20 100 %   09/01/21 1944 -- -- -- -- 100 %       Physical Exam  General: Awake, alert, pleasant, non-toxic.  Head:  Scalp is NC/AT  Eyes:  Conjunctiva normal, PERRL  ENT:  The external nose and ears are normal.    Neck:  Normal range of motion without rigidity.  CV:  Regular rate and rhythm    No pathologic murmur, rubs, or gallops.  Resp:  Breath sounds are clear bilaterally.  No crackles, wheezes, rhonchi, stridor.    Non-labored, no retractions or accessory muscle use  Abdomen: Abdomen is soft, no distension, no tenderness, no masses. No CVA tenderness.  MS:  No lower extremity edema or asymmetric calf swelling. Normal ROM in all joints without effusions.    No midline cervical, thoracic, or lumbar tenderness  Skin:  Warm and dry, No rash or lesions noted. 2+ peripheral pulses in all extremities  Neuro: Alert and oriented x3.  No gross motor deficits.  No facial asymmetry.  Psych: Awake. Alert. Normal affect. Appropriate interactions.    Emergency Department Course   ECG  ECG taken at 2002, ECG read at 2003  Normal sinus rhythm. Normal ECG.   Rate 61 bpm. AZ interval 152 ms. QRS duration 90 ms. QT/QTc 392/394 ms. P-R-T axes 32 -1 14.     Imaging:  CT Aortic Survey w Contrast   Final Result   IMPRESSION:   1.  Mild ectasia of the ascending aorta. No dissection.      2.  Cholecystectomy.      3.  Fatty liver.      4.  Fat-containing right spigelian hernia with small fat-containing periumbilical hernia. No bowel obstruction.         Chest XR,  PA & LAT   Final Result   IMPRESSION: Negative chest.          Laboratory:  Labs Ordered and Resulted from Time of ED Arrival Up to the Time of Departure from the ED   COMPREHENSIVE METABOLIC PANEL - Abnormal; Notable for the following components:       Result Value    Glucose 113 (*)     All other components within normal limits   D DIMER QUANTITATIVE - Normal    Narrative:     This D-dimer assay is intended for use in conjunction with a clinical pretest probability assessment model to exclude pulmonary embolism (PE) and deep venous thrombosis (DVT) in outpatients suspected of PE or DVT. The cut-off value is 0.50 ug/mL FEU.   TROPONIN I - Normal   LIPASE - Normal   CBC WITH PLATELETS  & DIFFERENTIAL    Narrative:     The following orders were created for panel order CBC + differential.  Procedure                               Abnormality         Status                     ---------                               -----------         ------                     CBC with platelets and d...[[id number]]                      Final result                 Please view results for these tests on the individual orders.   CBC WITH PLATELETS AND DIFFERENTIAL   EXTRA RED TOP TUBE   EXTRA GREEN TOP (LITHIUM HEPARIN) TUBE   EXTRA TUBE    Narrative:     The following orders were created for panel order [facility] Draw.  Procedure                               Abnormality         Status                     ---------                               -----------         ------                     Extra Red Top Tube[[id number]]                               Final result               Extra Green Top (Lithium...[[id number]]                      Final result                 Please view results for these tests on the individual orders.       Procedures    Emergency Department Course:    Reviewed:  I reviewed nursing notes, vitals, past medical history and care everywhere    Assessments:  I obtained history and examined the patient as noted above.     I rechecked the patient and explained findings.       Consults:    Interventions:  Medications   famotidine (PEPCID) injection 20 mg (20 mg Intravenous Given 21)   sodium chloride (PF) 0.9% PF flush 100 mL (60 mLs Intravenous Given 21)   iopamidol (ISOVUE-370) solution 500 mL (90 mLs Intravenous Given 21)     Disposition:  The patient was discharged to home.     Impression & Plan     Medical Decision Makin-year-old male with history of pulmonary embolism, aortic ectasia, among others presents with chest and epigastric pain.  Broad differential considered.  EKG here is normal without evidence of ischemia or arrhythmia.  Troponin is undetectable despite  ongoing symptoms greater than 6 hours.  The patient is a heart score of 2 ([id number]) and I feel ACS is unlikely.  Additionally he has undergone invasive coronary angiography within the last year which showed only very mild CAD with 10% stenosis to 2 vessels and therefore the likelihood of occlusive CAD is exceedingly low.  He is therapeutically anticoagulated and D-dimer is negative essentially ruling out PE.  CT scan of the chest abdomen and pelvis shows no etiology for the patient's pain and specifically shows no evidence of dissection, aneurysmal enlargement, pneumothorax, esophageal rupture, intra-abdominal catastrophe etc.  Lipase and LFTs unremarkable.  There is a small spit Gurpreet hernia without evidence of obstruction or incarceration and on exam he has no tenderness in this area and is likely an incidental finding.  Suspect GERD is most likely.  Likely a component of anxiety contributing as well.  Follow-up with PCP in 2 to 3 days.  I did urge him to trial a course of acid suppression medications.  He will return for new or worsening symptoms.    Diagnosis:    ICD-10-CM    1. Chest pain  R07.9    2. Epigastric pain  R10.13    3. Spigelian hernia  K43.9    4. GERD (gastroesophageal reflux disease)  K21.9        Discharge Medications:  Discharge Medication List as of 9/1/2021 11:34 PM          Scribe Disclosure:  I, Chance Rico, am serving as a scribe at 9:01 PM on 9/1/2021 to document services personally performed by Oj Sauceda PA-C based on my observations and the provider's statements to me.          Oj Sauceda PA-C  09/02/21 0025

## 2021-09-18 ENCOUNTER — HEALTH MAINTENANCE LETTER (OUTPATIENT)
Age: 41
End: 2021-09-18

## 2021-11-10 ENCOUNTER — TRANSFERRED RECORDS (OUTPATIENT)
Dept: HEALTH INFORMATION MANAGEMENT | Facility: CLINIC | Age: 41
End: 2021-11-10
Payer: COMMERCIAL

## 2021-11-11 ENCOUNTER — VIRTUAL VISIT (OUTPATIENT)
Dept: FAMILY MEDICINE | Facility: CLINIC | Age: 41
End: 2021-11-11
Payer: COMMERCIAL

## 2021-11-11 DIAGNOSIS — I26.02 ACUTE SADDLE PULMONARY EMBOLISM WITH ACUTE COR PULMONALE (H): ICD-10-CM

## 2021-11-11 DIAGNOSIS — F43.22 ADJUSTMENT DISORDER WITH ANXIOUS MOOD: Primary | ICD-10-CM

## 2021-11-11 DIAGNOSIS — I10 BENIGN ESSENTIAL HYPERTENSION: ICD-10-CM

## 2021-11-11 PROCEDURE — 99214 OFFICE O/P EST MOD 30 MIN: CPT | Mod: 95 | Performed by: FAMILY MEDICINE

## 2021-11-11 RX ORDER — BUSPIRONE HYDROCHLORIDE 10 MG/1
10 TABLET ORAL 2 TIMES DAILY
Qty: 60 TABLET | Refills: 3 | Status: SHIPPED | OUTPATIENT
Start: 2021-11-11 | End: 2023-05-05

## 2021-11-11 RX ORDER — LISINOPRIL 5 MG/1
5 TABLET ORAL DAILY
Qty: 90 TABLET | Refills: 0 | Status: SHIPPED | OUTPATIENT
Start: 2021-11-11 | End: 2022-03-14

## 2021-11-11 ASSESSMENT — ANXIETY QUESTIONNAIRES
1. FEELING NERVOUS, ANXIOUS, OR ON EDGE: MORE THAN HALF THE DAYS
2. NOT BEING ABLE TO STOP OR CONTROL WORRYING: MORE THAN HALF THE DAYS
8. IF YOU CHECKED OFF ANY PROBLEMS, HOW DIFFICULT HAVE THESE MADE IT FOR YOU TO DO YOUR WORK, TAKE CARE OF THINGS AT HOME, OR GET ALONG WITH OTHER PEOPLE?: VERY DIFFICULT
7. FEELING AFRAID AS IF SOMETHING AWFUL MIGHT HAPPEN: MORE THAN HALF THE DAYS
GAD7 TOTAL SCORE: 14
6. BECOMING EASILY ANNOYED OR IRRITABLE: MORE THAN HALF THE DAYS
7. FEELING AFRAID AS IF SOMETHING AWFUL MIGHT HAPPEN: MORE THAN HALF THE DAYS
GAD7 TOTAL SCORE: 14
GAD7 TOTAL SCORE: 14
5. BEING SO RESTLESS THAT IT IS HARD TO SIT STILL: MORE THAN HALF THE DAYS
3. WORRYING TOO MUCH ABOUT DIFFERENT THINGS: MORE THAN HALF THE DAYS
4. TROUBLE RELAXING: MORE THAN HALF THE DAYS

## 2021-11-11 ASSESSMENT — PATIENT HEALTH QUESTIONNAIRE - PHQ9
SUM OF ALL RESPONSES TO PHQ QUESTIONS 1-9: 18
10. IF YOU CHECKED OFF ANY PROBLEMS, HOW DIFFICULT HAVE THESE PROBLEMS MADE IT FOR YOU TO DO YOUR WORK, TAKE CARE OF THINGS AT HOME, OR GET ALONG WITH OTHER PEOPLE: VERY DIFFICULT

## 2021-11-11 ASSESSMENT — ENCOUNTER SYMPTOMS: NERVOUS/ANXIOUS: 1

## 2021-11-11 NOTE — PROGRESS NOTES
Jace is a 41 year old who is being evaluated via a billable video visit.      How would you like to obtain your AVS? Anjum  If the video visit is dropped, the invitation should be resent by: Anjum or else Text to cell phone: [phone]  Will anyone else be joining your video visit? No    Video Start Time: 12:54 PM    Assessment & Plan     Acute saddle pulmonary embolism with acute cor pulmonale (H)  At the [facility] right now doing a hypercoaguable workup  He is on this for life    - rivaroxaban ANTICOAGULANT (XARELTO) 20 MG TABS tablet; Take 1 tablet (20 mg) by mouth daily (with dinner)    Benign essential hypertension  stable  - lisinopril (ZESTRIL) 5 MG tablet; Take 1 tablet (5 mg) by mouth daily    Adjustment disorder with anxious mood  Worsening anxiety and low mood due to many recent medical issues    Initial request was for buspar or maybe buproprion, similar sounding drugs.  He has been on both  Looks like bupropion is listed as an allergy (made him feel worse)  So we will  Do buspar  - busPIRone (BUSPAR) 10 MG tablet; Take 1 tablet (10 mg) by mouth 2 times daily    No follow-ups on file.    Clement Pandya MD  [facility]   Jace is a 41 year old who presents for the following health issues   Anxiety    History of Present Illness       He eats 0-1 servings of fruits and vegetables daily. He is missing 1 dose(s) of medications per week.  He is not taking prescribed medications regularly due to remembering to take.       Medication Followup of xarelto, lisinopril    Taking Medication as prescribed: yes    Side Effects:  None    Medication Helping Symptoms:  Yes - bp today 136/81    Pt also requesting refill for buspirone.     Anxiety Follow-Up    How are you doing with your anxiety since your last visit? Worsened     Are you having other symptoms that might be associated with anxiety? No    Have you had a significant life event? Health Concerns     Are you  feeling depressed? yes    Do you have any concerns with your use of alcohol or other drugs? No    Social History     Tobacco Use     Smoking status: Former Smoker     Packs/day: 0.00     Years: 0.00     Pack years: 0.00     Types: Cigarettes     Smokeless tobacco: Former User     Quit date: 2019   Substance Use Topics     Alcohol use: Yes     Comment: A glass a wine a week      Drug use: Never     CRISTY-7 SCORE 11/11/2021   Total Score 14 (moderate anxiety)   Total Score 14     PHQ 11/11/2021 11/11/2021   PHQ-9 Total Score 18 18   Q9: Thoughts of better off dead/self-harm past 2 weeks More than half the days More than half the days   F/U: Thoughts of suicide or self-harm No -   F/U: Safety concerns No -     Last PHQ-9 11/11/2021   1.  Little interest or pleasure in doing things 2   2.  Feeling down, depressed, or hopeless 2   3.  Trouble falling or staying asleep, or sleeping too much 2   4.  Feeling tired or having little energy 2   5.  Poor appetite or overeating 2   6.  Feeling bad about yourself 2   7.  Trouble concentrating 2   8.  Moving slowly or restless 2   Q9: Thoughts of better off dead/self-harm past 2 weeks 2   PHQ-9 Total Score 18   In the past two weeks have you had thoughts of suicide or self harm? -   Do you have concerns about your personal safety or the safety of others? -     CRISTY-7  11/11/2021   1. Feeling nervous, anxious, or on edge 2   2. Not being able to stop or control worrying 2   3. Worrying too much about different things 2   4. Trouble relaxing 2   5. Being so restless that it is hard to sit still 2   6. Becoming easily annoyed or irritable 2   7. Feeling afraid, as if something awful might happen 2   CRISTY-7 Total Score 14             Review of Systems   Psychiatric/Behavioral: The patient is nervous/anxious.             Objective           Vitals:  No vitals were obtained today due to virtual visit.    Physical Exam   GENERAL: Healthy, alert and no distress  EYES: Eyes grossly normal to  inspection.  No discharge or erythema, or obvious scleral/conjunctival abnormalities.  RESP: No audible wheeze, cough, or visible cyanosis.  No visible retractions or increased work of breathing.    SKIN: Visible skin clear. No significant rash, abnormal pigmentation or lesions.  NEURO: Cranial nerves grossly intact.  Mentation and speech appropriate for age.  PSYCH: Mentation appears normal, affect normal/bright, judgement and insight intact, normal speech and appearance well-groomed.          Video-Visit Details    Type of service:  Video Visit    Video End Time:1:02 PM    Originating Location (pt. Location): Home    Distant Location (provider location):  [facility]     Platform used for Video Visit: [email or web address]  Answers for HPI/ROS submitted by the patient on 11/11/2021  PHQ9 TOTAL SCORE: 18  CRISTY 7 TOTAL SCORE: 14    Conflicting answers have been found for some questions. Please document the patient's answers manually.

## 2021-11-12 ASSESSMENT — ANXIETY QUESTIONNAIRES: GAD7 TOTAL SCORE: 14

## 2021-11-12 ASSESSMENT — PATIENT HEALTH QUESTIONNAIRE - PHQ9: SUM OF ALL RESPONSES TO PHQ QUESTIONS 1-9: 18

## 2021-11-17 ENCOUNTER — TELEPHONE (OUTPATIENT)
Dept: HEMATOLOGY | Facility: CLINIC | Age: 41
End: 2021-11-17
Payer: COMMERCIAL

## 2021-11-17 NOTE — TELEPHONE ENCOUNTER
Follow up patient note  Interventional spine and sports physiatry, Physical medicine rehabilitation      Chief complaint:   Chief Complaint   Patient presents with   • Follow-Up     Back pain          HISTORY    Please see new patient note by Dr Sutton,  for more details.     HPI  Patient identification: Ha Steve ,  1957,   With Diagnoses of Arthritis of knee, History of left knee surgery done by Dr Beltrán, Chronic pain of left knee, History of lumbosacral spine surgery previously had surgery by Dr Alfonzo Gale in [place] and with Dr Dong at [facility], History of right hip replacement done by Dr Beltrán, Chronic bilateral low back pain without sciatica, Lumbar radiculopathy, Chronic bilateral thoracic back pain, Myalgia, and Impaired mobility and ADLs were pertinent to this visit.       In the interim the patient has responded well for his left knee pain and low back pain locks cam and a home exercise program.  He does have an aching pain in the upper lumbar/lower thoracic paraspinal region nonradiating moderate in intensity but this is not causing any functional deficits and he denies any trauma or injuries.       ROS Red Flags :   Fever, Chills, Sweats: Denies  Involuntary Weight Loss: Denies  Bowel/Bladder Incontinence: Denies  Saddle Anesthesia: Denies        PMHx:   Past Medical History:   Diagnosis Date   • Arthritis     hips back hands neck   • Gout    • High cholesterol    • HLD (hyperlipidemia)    • HTN (hypertension)    • Joint pain     Neck, Back, and Hip       PSHx:   Past Surgical History:   Procedure Laterality Date   • EXTREME LATERAL INTERBODY FUSION  2017    Procedure: EXTREME LATERAL INTERBODY FUSION L2-3 (STAGE 1);  Surgeon: Mateusz Dong M.D.;  Location: SURGERY [facility];  Service:    • LUMBAR FUSION POSTERIOR  2017    Procedure: LUMBAR FUSION POSTERIOR L2-3 REDO (STAGE 2);  Surgeon: Mateusz Dong M.D.;  Location: SURGERY [facility];  Service:    •  LVM to remind pt about appt   LUMBAR LAMINECTOMY DISKECTOMY  8/8/2017    Procedure: LUMBAR LAMINECTOMY DISKECTOMY L2-3 REDO;  Surgeon: Mateusz Dong M.D.;  Location: SURGERY [facility];  Service:    • CARPAL TUNNEL RELEASE Bilateral    • HIP ARTHROSCOPY Bilateral    • NECK EXPLORATION      Fusion   • OTHER      dislocated toe   • OTHER      Back fusion       Family history   Family History   Problem Relation Age of Onset   • Cancer Mother         Brain   • Heart Disease Father         triple bypas/Anuersym   • Heart Disease Paternal Grandfather         Anuersym         Medications:   Outpatient Medications Marked as Taking for the 11/19/20 encounter (Office Visit) with Oc Sutton M.D.   Medication Sig Dispense Refill   • meloxicam (MOBIC) 15 MG tablet Take 1 Tab by mouth every day for 90 days. Do not take other NSAIDs. No refills. 90 Tab 0   • amLODIPine (NORVASC) 5 MG Tab Take 1 Tab by mouth every day. 90 Tab 3   • losartan-hydrochlorothiazide (HYZAAR) 100-25 MG per tablet TAKE 1 TABLET BY MOUTH ONCE DAILY 90 Tab 3   • allopurinol (ZYLOPRIM) 300 MG Tab Take 1 tablet by mouth once daily 90 Tab 3   • atorvastatin (LIPITOR) 20 MG Tab Take 1 tablet by mouth once daily 90 Tab 3   • colchicine-probenecid 0.5-500 MG per tablet Take 1 Tab by mouth every day. 90 Tab 3        Current Outpatient Medications on File Prior to Visit   Medication Sig Dispense Refill   • amLODIPine (NORVASC) 5 MG Tab Take 1 Tab by mouth every day. 90 Tab 3   • losartan-hydrochlorothiazide (HYZAAR) 100-25 MG per tablet TAKE 1 TABLET BY MOUTH ONCE DAILY 90 Tab 3   • allopurinol (ZYLOPRIM) 300 MG Tab Take 1 tablet by mouth once daily 90 Tab 3   • atorvastatin (LIPITOR) 20 MG Tab Take 1 tablet by mouth once daily 90 Tab 3   • colchicine-probenecid 0.5-500 MG per tablet Take 1 Tab by mouth every day. 90 Tab 3     No current facility-administered medications on file prior to visit.          Allergies:   No Known Allergies    Social Hx:   Social History     Socioeconomic  "History   • Marital status:      Spouse name: Not on file   • Number of children: Not on file   • Years of education: Not on file   • Highest education level: Not on file   Occupational History   • Not on file   Social Needs   • Financial resource strain: Not on file   • Food insecurity     Worry: Not on file     Inability: Not on file   • Transportation needs     Medical: Not on file     Non-medical: Not on file   Tobacco Use   • Smoking status: Never Smoker   • Smokeless tobacco: Current User     Types: Chew   Substance and Sexual Activity   • Alcohol use: Yes     Alcohol/week: 0.0 oz     Comment: 6 per week   • Drug use: Yes     Types: Marijuana     Comment: for pain    • Sexual activity: Not Currently     Partners: Female   Lifestyle   • Physical activity     Days per week: Not on file     Minutes per session: Not on file   • Stress: Not on file   Relationships   • Social connections     Talks on phone: Not on file     Gets together: Not on file     Attends Jain service: Not on file     Active member of club or organization: Not on file     Attends meetings of clubs or organizations: Not on file     Relationship status: Not on file   • Intimate partner violence     Fear of current or ex partner: Not on file     Emotionally abused: Not on file     Physically abused: Not on file     Forced sexual activity: Not on file   Other Topics Concern   •  Service No   • Blood Transfusions No   • Caffeine Concern No   • Occupational Exposure No   • Hobby Hazards No   • Sleep Concern No   • Stress Concern No   • Weight Concern Yes   • Special Diet No   • Back Care No   • Exercise No   • Bike Helmet No   • Seat Belt No   • Self-Exams Yes   Social History Narrative   • Not on file         EXAMINATION     Physical Exam:   Vitals: /72 (BP Location: Right arm, Patient Position: Sitting, BP Cuff Size: Adult)   Pulse 86   Temp 36.6 °C (97.8 °F) (Temporal)   Ht 1.778 m (5' 10\")   Wt 116.2 kg (256 lb 2.8 " oz)   SpO2 94%     Constitutional:   Body Habitus: Body mass index is 36.76 kg/m².  Cooperation: Fully cooperates with exam  Appearance: Well-groomed no disheveled    Respiratory-  breathing comfortable on room air, no audible wheezing  Cardiovascular- capillary refills less than 2 seconds. No lower extremity edema is noted.   Psychiatric- alert and oriented ×3. Normal affect.    MSK and Neuro: -  Strength is 5 out of 5 in the bilateral upper and lower extremities.  No areas of tenderness to palpation in the spine    LEFT  Knee:   Inspection no swelling, rashes or deformities,   Palpation positive mild tenderness palpation of the medial and lateral joint line.  No significant tenderness to palpation throughout the knee including the  quadriceps tendon, patellar tendon, patellar, medial patellar facets, lateral patellar facets, tibial tuberosity, fibular head.  Range of motion normal range of motion in flexion and extension  Special tests:   Varus stress tests: Negative  Valgus stress tests: Negative  Lockman's test: Negative  Anterior drawer: Negative  Posterior drawer: Negative  Hailey's: negative               MEDICAL DECISION MAKING    DATA    Labs:   Lab Results   Component Value Date/Time    SODIUM 142 10/19/2020 08:41 AM    POTASSIUM 3.6 10/19/2020 08:41 AM    CHLORIDE 103 10/19/2020 08:41 AM    CO2 26 10/19/2020 08:41 AM    GLUCOSE 101 (H) 10/19/2020 08:41 AM    BUN 20 10/19/2020 08:41 AM    CREATININE 1.17 10/19/2020 08:41 AM        Lab Results   Component Value Date/Time    PROTHROMBTM 12.8 08/02/2017 11:44 AM    INR 0.93 08/02/2017 11:44 AM        Lab Results   Component Value Date/Time    WBC 6.3 10/19/2020 08:37 AM    RBC 4.85 10/19/2020 08:37 AM    HEMOGLOBIN 15.1 10/19/2020 08:37 AM    HEMATOCRIT 43.4 10/19/2020 08:37 AM    MCV 89.5 10/19/2020 08:37 AM    MCH 31.1 10/19/2020 08:37 AM    MCHC 34.8 10/19/2020 08:37 AM    MPV 11.1 10/19/2020 08:37 AM    NEUTSPOLYS 56.50 10/19/2020 08:37 AM     LYMPHOCYTES 28.70 10/19/2020 08:37 AM    MONOCYTES 9.10 10/19/2020 08:37 AM    EOSINOPHILS 4.10 10/19/2020 08:37 AM    BASOPHILS 1.30 10/19/2020 08:37 AM        Lab Results   Component Value Date/Time    HBA1C 5.6 08/27/2019 09:08 AM          Imaging:   I personally reviewed following images    MRI lumbar spine 10/20/2020  Fusion is in place.  The left L5-S1 neuroforamen has severe neuroforaminal stenosis with bony overgrowth.    I reviewed the following radiology reports                                        Results for orders placed during the hospital encounter of 10/20/20   MR-LUMBAR SPINE-W/O    Impression 1.  Extensive caudal lumbar operative intervention with hardware remaining at L2/3, no hardware L3/4 through L5 but there is lateral mass bony fusion and laminectomies.    2.  Severe foraminal stenosis left L5/S1 is due to bony overgrowth. Lesser stenoses at other levels as detailed above    3.  4 cm postoperative fluid collection at the L2/3 level posteriorly on the right does not exert mass effect on the canal and most likely represents a postoperative seroma or fluid from the facet joint favored over pseudomeningocele. Superinfection is   not suspected but cannot be excluded on the basis of imaging alone    4.  Only central stenosis is borderline in the lateral recesses at L2/3                Results for orders placed during the hospital encounter of 10/20/20   MR-LUMBAR SPINE-W/O    Impression 1.  Extensive caudal lumbar operative intervention with hardware remaining at L2/3, no hardware L3/4 through L5 but there is lateral mass bony fusion and laminectomies.    2.  Severe foraminal stenosis left L5/S1 is due to bony overgrowth. Lesser stenoses at other levels as detailed above    3.  4 cm postoperative fluid collection at the L2/3 level posteriorly on the right does not exert mass effect on the canal and most likely represents a postoperative seroma or fluid from the facet joint favored over  pseudomeningocele. Superinfection is   not suspected but cannot be excluded on the basis of imaging alone    4.  Only central stenosis is borderline in the lateral recesses at L2/3                                                                                                                                                              Results for orders placed during the hospital encounter of 08/02/17   DX-CHEST-2 VIEWS    Impression NEGATIVE TWO VIEWS OF THE CHEST.                                          Results for orders placed in visit on 10/08/20   DX-KNEE COMPLETE 4+ LEFT    Impression No evidence of fracture or dislocation.  Findings are consistent with moderate osteoarthritis.      Results for orders placed during the hospital encounter of 10/20/20   DX-LUMBAR SPINE-2 OR 3 VIEWS    Impression 1.  No compression deformity or acute fracture is identified.    2.  Degenerative disc disease and facet arthropathy are again noted throughout the lumbar spine.    3.  Postsurgical changes are again noted.      Results for orders placed during the hospital encounter of 08/02/17   DX-LUMBAR SPINE-4+ VIEWS    Impression 1.  Previous laminectomy and posterior bony fusion from L3 to the sacrum.    2.  Mild to moderate marginal osteophytosis throughout the lumbar region.    3.  Moderate to marked discal degenerative changes throughout the mid to lower cervical spine.    4.  No evidence of significant subluxation with flexion or extension.                        Results for orders placed in visit on 09/18/19   DX-SHOULDER 2+ LEFT    Impression There is no evidence of acute fracture.  Findings are consistent with osteoarthritis including spurring at the AC joint.                     DIAGNOSIS   Visit Diagnoses     ICD-10-CM   1. Arthritis of knee  M17.10   2. History of left knee surgery done by Dr Beltrán  Z98.890   3. Chronic pain of left knee  M25.562    G89.29   4. History of lumbosacral spine surgery previously had  surgery by Dr Alfonzo Gale in [place] and with Dr Dong at [facility]  Z98.750   5. History of right hip replacement done by Dr Beltrán  Z96.323   6. Chronic bilateral low back pain without sciatica  M54.5    G89.29   7. Lumbar radiculopathy  M54.16   8. Chronic bilateral thoracic back pain  M54.6    G89.29   9. Myalgia  M79.10   10. Impaired mobility and ADLs  Z74.09    Z78.9         ASSESSMENT and PLAN:     Ha Steve  1957 male      Ha was seen today for follow-up.    Diagnoses and all orders for this visit:    Arthritis of knee  -     REFERRAL TO PHYSICAL THERAPY  -     meloxicam (MOBIC) 15 MG tablet; Take 1 Tab by mouth every day for 90 days. Do not take other NSAIDs. No refills.    History of left knee surgery done by Dr Beltrán  -     REFERRAL TO PHYSICAL THERAPY  -     meloxicam (MOBIC) 15 MG tablet; Take 1 Tab by mouth every day for 90 days. Do not take other NSAIDs. No refills.    Chronic pain of left knee  -     REFERRAL TO PHYSICAL THERAPY  -     meloxicam (MOBIC) 15 MG tablet; Take 1 Tab by mouth every day for 90 days. Do not take other NSAIDs. No refills.    History of lumbosacral spine surgery previously had surgery by Dr Alfonzo Gale in [place] and with Dr Dong at [facility]  -     REFERRAL TO PHYSICAL THERAPY  -     meloxicam (MOBIC) 15 MG tablet; Take 1 Tab by mouth every day for 90 days. Do not take other NSAIDs. No refills.    History of right hip replacement done by Dr Beltrán  -     REFERRAL TO PHYSICAL THERAPY  -     meloxicam (MOBIC) 15 MG tablet; Take 1 Tab by mouth every day for 90 days. Do not take other NSAIDs. No refills.    Chronic bilateral low back pain without sciatica  -     REFERRAL TO PHYSICAL THERAPY  -     meloxicam (MOBIC) 15 MG tablet; Take 1 Tab by mouth every day for 90 days. Do not take other NSAIDs. No refills.    Lumbar radiculopathy  -     REFERRAL TO PHYSICAL THERAPY  -     meloxicam (MOBIC) 15 MG tablet; Take 1 Tab by mouth every day for 90  days. Do not take other NSAIDs. No refills.    Chronic bilateral thoracic back pain  -     REFERRAL TO PHYSICAL THERAPY  -     meloxicam (MOBIC) 15 MG tablet; Take 1 Tab by mouth every day for 90 days. Do not take other NSAIDs. No refills.    Myalgia  -     REFERRAL TO PHYSICAL THERAPY  -     meloxicam (MOBIC) 15 MG tablet; Take 1 Tab by mouth every day for 90 days. Do not take other NSAIDs. No refills.    Impaired mobility and ADLs  -     REFERRAL TO PHYSICAL THERAPY  -     meloxicam (MOBIC) 15 MG tablet; Take 1 Tab by mouth every day for 90 days. Do not take other NSAIDs. No refills.          The patient has had interval improvement with conservative treatments for his low back pain and left knee pain.  He does not want to meloxicam and I placed a renewal of this prescription.  Ideally the patient would not be on NSAIDs indefinitely but in the meantime while he works to physical therapy and his home exercise program I believe this is reasonable.  We discussed the findings of his MRI lumbar spine as above and if he does have signs of a radiculopathy then he should seek treatment.  We discussed emergency precautions.  We discussed a surgical referral and the patient declined.      11/19/2020 I reviewed the notes from [facility]'s.  This includes the neurosurgery notes.  It was noted that the patient had moderate left-sided neuroforaminal narrowing at C4-5 as well as C5-6 uncovertebral hypertrophy and bilateral neuroforaminal narrowing left greater than right and C6-7 disc bulge.  He was offered a C4-C7 ACDF.  Was also noted the patient had left carpal tunnel syndrome was offered a carpal tunnel release.  For his lumbar spine in July 2008 he underwent an L3 to sacrum decompression and posterior lateral fusion.    Follow up: 6 months    Thank you for allowing me to participate in the care of this patient. If you have any questions please not hesitate to contact me.             Please note that this dictation  was created using voice recognition software. I have made every reasonable attempt to correct obvious errors but there may be errors of grammar and content that I may have overlooked prior to finalization of this note.      Oc Sutton MD  Interventional Spine and Sports Physiatry  Physical Medicine and Rehabilitation  [facility]

## 2021-11-18 ENCOUNTER — HOSPITAL ENCOUNTER (EMERGENCY)
Facility: CLINIC | Age: 41
Discharge: LEFT BEFORE DISCHARGE | End: 2021-11-18
Admitting: EMERGENCY MEDICINE
Payer: COMMERCIAL

## 2021-11-18 VITALS
SYSTOLIC BLOOD PRESSURE: 145 MMHG | WEIGHT: 315 LBS | RESPIRATION RATE: 16 BRPM | BODY MASS INDEX: 37.19 KG/M2 | HEIGHT: 77 IN | OXYGEN SATURATION: 96 % | HEART RATE: 93 BPM | TEMPERATURE: 98.8 F | DIASTOLIC BLOOD PRESSURE: 105 MMHG

## 2021-11-18 LAB
ANION GAP SERPL CALCULATED.3IONS-SCNC: 6 MMOL/L (ref 3–14)
BASOPHILS # BLD AUTO: 0 10E3/UL (ref 0–0.2)
BASOPHILS NFR BLD AUTO: 0 %
BUN SERPL-MCNC: 13 MG/DL (ref 7–30)
CALCIUM SERPL-MCNC: 9.5 MG/DL (ref 8.5–10.1)
CHLORIDE BLD-SCNC: 105 MMOL/L (ref 94–109)
CO2 SERPL-SCNC: 27 MMOL/L (ref 20–32)
CREAT SERPL-MCNC: 1.29 MG/DL (ref 0.66–1.25)
EOSINOPHIL # BLD AUTO: 0 10E3/UL (ref 0–0.7)
EOSINOPHIL NFR BLD AUTO: 0 %
ERYTHROCYTE [DISTWIDTH] IN BLOOD BY AUTOMATED COUNT: 13.5 % (ref 10–15)
GFR SERPL CREATININE-BSD FRML MDRD: 68 ML/MIN/1.73M2
GLUCOSE BLD-MCNC: 104 MG/DL (ref 70–99)
HCT VFR BLD AUTO: 53.5 % (ref 40–53)
HGB BLD-MCNC: 17.5 G/DL (ref 13.3–17.7)
HOLD SPECIMEN: NORMAL
IMM GRANULOCYTES # BLD: 0 10E3/UL
IMM GRANULOCYTES NFR BLD: 0 %
LYMPHOCYTES # BLD AUTO: 0.9 10E3/UL (ref 0.8–5.3)
LYMPHOCYTES NFR BLD AUTO: 19 %
MCH RBC QN AUTO: 29.2 PG (ref 26.5–33)
MCHC RBC AUTO-ENTMCNC: 32.7 G/DL (ref 31.5–36.5)
MCV RBC AUTO: 89 FL (ref 78–100)
MONOCYTES # BLD AUTO: 0.8 10E3/UL (ref 0–1.3)
MONOCYTES NFR BLD AUTO: 17 %
NEUTROPHILS # BLD AUTO: 3 10E3/UL (ref 1.6–8.3)
NEUTROPHILS NFR BLD AUTO: 64 %
NRBC # BLD AUTO: 0 10E3/UL
NRBC BLD AUTO-RTO: 0 /100
PLATELET # BLD AUTO: 183 10E3/UL (ref 150–450)
POTASSIUM BLD-SCNC: 4.3 MMOL/L (ref 3.4–5.3)
RBC # BLD AUTO: 5.99 10E6/UL (ref 4.4–5.9)
SODIUM SERPL-SCNC: 138 MMOL/L (ref 133–144)
WBC # BLD AUTO: 4.7 10E3/UL (ref 4–11)

## 2021-11-18 PROCEDURE — 80048 BASIC METABOLIC PNL TOTAL CA: CPT | Performed by: EMERGENCY MEDICINE

## 2021-11-18 PROCEDURE — 85004 AUTOMATED DIFF WBC COUNT: CPT | Performed by: EMERGENCY MEDICINE

## 2021-11-18 PROCEDURE — 999N000104 HC STATISTIC NO CHARGE

## 2021-11-18 PROCEDURE — 36415 COLL VENOUS BLD VENIPUNCTURE: CPT | Performed by: EMERGENCY MEDICINE

## 2021-11-18 ASSESSMENT — MIFFLIN-ST. JEOR: SCORE: 2814.09

## 2021-11-18 NOTE — ED TRIAGE NOTES
Ill since Tuesday. Diagnosed with Covid on 11/17/21. Has had the Valentín and Valentín vaccine. Pt has history of blood clots and an ascending aortic aneurysm.     Pt notes short of breath, fevers and light headed at home. Cold sweats. Temp at home 101.7 this am. Prior to arrival patient states he had a 'white' stool.

## 2022-02-16 ENCOUNTER — APPOINTMENT (OUTPATIENT)
Dept: ULTRASOUND IMAGING | Facility: CLINIC | Age: 42
End: 2022-02-16
Attending: EMERGENCY MEDICINE
Payer: COMMERCIAL

## 2022-02-16 ENCOUNTER — HOSPITAL ENCOUNTER (EMERGENCY)
Facility: CLINIC | Age: 42
Discharge: HOME | End: 2022-02-16
Attending: EMERGENCY MEDICINE | Admitting: EMERGENCY MEDICINE
Payer: COMMERCIAL

## 2022-02-16 VITALS
HEART RATE: 65 BPM | RESPIRATION RATE: 18 BRPM | WEIGHT: 295 LBS | SYSTOLIC BLOOD PRESSURE: 142 MMHG | BODY MASS INDEX: 34.98 KG/M2 | OXYGEN SATURATION: 94 % | DIASTOLIC BLOOD PRESSURE: 93 MMHG | TEMPERATURE: 97.2 F

## 2022-02-16 DIAGNOSIS — Z79.01 CHRONIC ANTICOAGULATION: ICD-10-CM

## 2022-02-16 DIAGNOSIS — R20.2 PARESTHESIA: ICD-10-CM

## 2022-02-16 LAB
ANION GAP SERPL CALCULATED.3IONS-SCNC: 3 MMOL/L (ref 3–14)
APTT PPP: 28 SECONDS (ref 22–38)
BASOPHILS # BLD AUTO: 0 10E3/UL (ref 0–0.2)
BASOPHILS NFR BLD AUTO: 0 %
BUN SERPL-MCNC: 15 MG/DL (ref 7–30)
CALCIUM SERPL-MCNC: 9.2 MG/DL (ref 8.5–10.1)
CHLORIDE BLD-SCNC: 107 MMOL/L (ref 94–109)
CO2 SERPL-SCNC: 30 MMOL/L (ref 20–32)
CREAT SERPL-MCNC: 1.18 MG/DL (ref 0.66–1.25)
EOSINOPHIL # BLD AUTO: 0.1 10E3/UL (ref 0–0.7)
EOSINOPHIL NFR BLD AUTO: 2 %
ERYTHROCYTE [DISTWIDTH] IN BLOOD BY AUTOMATED COUNT: 13.6 % (ref 10–15)
GFR SERPL CREATININE-BSD FRML MDRD: 80 ML/MIN/1.73M2
GLUCOSE BLD-MCNC: 86 MG/DL (ref 70–99)
HCT VFR BLD AUTO: 50.1 % (ref 40–53)
HGB BLD-MCNC: 16.4 G/DL (ref 13.3–17.7)
HOLD SPECIMEN: NORMAL
IMM GRANULOCYTES # BLD: 0 10E3/UL
IMM GRANULOCYTES NFR BLD: 0 %
INR PPP: 0.93 (ref 0.85–1.15)
LYMPHOCYTES # BLD AUTO: 2.2 10E3/UL (ref 0.8–5.3)
LYMPHOCYTES NFR BLD AUTO: 32 %
MCH RBC QN AUTO: 29.2 PG (ref 26.5–33)
MCHC RBC AUTO-ENTMCNC: 32.7 G/DL (ref 31.5–36.5)
MCV RBC AUTO: 89 FL (ref 78–100)
MONOCYTES # BLD AUTO: 0.7 10E3/UL (ref 0–1.3)
MONOCYTES NFR BLD AUTO: 10 %
NEUTROPHILS # BLD AUTO: 3.8 10E3/UL (ref 1.6–8.3)
NEUTROPHILS NFR BLD AUTO: 56 %
NRBC # BLD AUTO: 0 10E3/UL
NRBC BLD AUTO-RTO: 0 /100
PLATELET # BLD AUTO: 206 10E3/UL (ref 150–450)
POTASSIUM BLD-SCNC: 4.2 MMOL/L (ref 3.4–5.3)
RBC # BLD AUTO: 5.62 10E6/UL (ref 4.4–5.9)
SODIUM SERPL-SCNC: 140 MMOL/L (ref 133–144)
WBC # BLD AUTO: 6.9 10E3/UL (ref 4–11)

## 2022-02-16 PROCEDURE — 93971 EXTREMITY STUDY: CPT | Mod: LT

## 2022-02-16 PROCEDURE — 85730 THROMBOPLASTIN TIME PARTIAL: CPT | Performed by: EMERGENCY MEDICINE

## 2022-02-16 PROCEDURE — 99284 EMERGENCY DEPT VISIT MOD MDM: CPT | Mod: 25

## 2022-02-16 PROCEDURE — 36415 COLL VENOUS BLD VENIPUNCTURE: CPT | Performed by: EMERGENCY MEDICINE

## 2022-02-16 PROCEDURE — 80048 BASIC METABOLIC PNL TOTAL CA: CPT | Performed by: EMERGENCY MEDICINE

## 2022-02-16 PROCEDURE — 85025 COMPLETE CBC W/AUTO DIFF WBC: CPT | Performed by: EMERGENCY MEDICINE

## 2022-02-16 PROCEDURE — 85610 PROTHROMBIN TIME: CPT | Performed by: EMERGENCY MEDICINE

## 2022-02-16 RX ORDER — LIDOCAINE 40 MG/G
CREAM TOPICAL
Status: DISCONTINUED | OUTPATIENT
Start: 2022-02-16 | End: 2022-02-16 | Stop reason: HOSPADM

## 2022-02-16 NOTE — ED TRIAGE NOTES
Patient c/o numbness and tingling in left foot. Feels as if numbness and tingling is getting worse and now is extending up into leg. Weak pedal pulses palpated. Left foot wells cold. Right foot is warm. Patient has history of blood clots and is currently taking blood thinners. Denies chest pain or shortness of breath. ABCs intact.

## 2022-02-17 NOTE — ED PROVIDER NOTES
History     Chief Complaint:    Numbness      HPI   Jace Pratt is a 41 year old male who has a history of an ascending aortic aneurysm and PE already on Xarelto which he has been taking.  The patient said that he has had episodes of numbness sometimes in his right finger sometimes in his left thigh and sometimes in his left toes.  The patient said that when he is driving or in the wrong position he will feel pins-and-needles tingling this does go away.  Was slightly more prolonged today therefore he presented to the ER.  He said he did feel a coolness to the left side but is since improved.  The patient denies any abdominal pain chest pain no trauma prior to this.  There was no chest pain.    Review of Systems  10 point review of systems all negative except as in HPI    Allergies:    Bee Pollen  Cefazolin  Azithromycin  Latex  Morphine  Penicillins  Bupropion      Medications:      aspirin (ASA) 81 MG EC tablet  atorvastatin (LIPITOR) 20 MG tablet  busPIRone (BUSPAR) 10 MG tablet  EPINEPHrine (ANY BX GENERIC EQUIV) 0.3 MG/0.3ML injection 2-pack  Lidocaine (LIDOCARE) 4 % Patch  lisinopril (ZESTRIL) 5 MG tablet  rivaroxaban ANTICOAGULANT (XARELTO) 15 MG TABS tablet  rivaroxaban ANTICOAGULANT (XARELTO) 20 MG TABS tablet        Past Medical History:      Past Medical History:   Diagnosis Date     Acute saddle pulmonary embolism with acute cor pulmonale (H)      Anxiety      Ascending aortic aneurysm (H)      CAD (coronary artery disease) 12/01/2020     Chest pain      Depression      ANTON (dyspnea on exertion)      Former smoker      Neuromuscular disorder (H)      S/P coronary angiogram 12/01/2020     Stable angina (H)      Varicose veins of right lower extremity with edema      Patient Active Problem List    Diagnosis Date Noted     Portal vein thrombosis 07/08/2021     Priority: Medium     Abdominal pain, epigastric 07/08/2021     Priority: Medium     Chest pain, unspecified type 12/01/2020     Priority: Medium      Stable angina pectoris (H) 11/30/2020     Priority: Medium     Added automatically from request for surgery [id number]       Elevated coronary artery calcium score 11/30/2020     Priority: Medium     History of pulmonary embolism 11/30/2020     Priority: Medium     Saddle embolis, required lytics 3/19       Hx of major depression 11/30/2020     Priority: Medium     Ascending aortic aneurysm (H) 11/30/2020     Priority: Medium     Varicose veins of right lower extremity with edema 11/30/2020     Priority: Medium        Past Surgical History:      Past Surgical History:   Procedure Laterality Date     ANKLE SURGERY Right 2019    CMT     APPENDECTOMY  2007     CHOLECYSTECTOMY  2008     CV HEART CATHETERIZATION WITH POSSIBLE INTERVENTION N/A 12/1/2020    Procedure: Heart Catheterization with Possible Intervention;  Surgeon: Nurys Lopez MD;  Location:  HEART CARDIAC CATH LAB     CV LEFT HEART CATH N/A 12/1/2020    Procedure: Left Heart Cath;  Surgeon: Nurys Lopez MD;  Location:  HEART CARDIAC CATH LAB       Family History:      Family History   Problem Relation Age of Onset     Depression Mother      Depression Father      Clotting Disorder Maternal Grandmother      Depression Brother        Social History:  Denies tobacco use    Physical Exam     Patient Vitals for the past 24 hrs:   BP Temp Temp src Pulse Resp SpO2 Weight   02/16/22 1933 (!) 142/93 -- -- 65 18 94 % --   02/16/22 1900 (!) 139/90 -- -- 69 16 99 % --   02/16/22 1850 (!) 136/92 -- -- 63 -- -- --   02/16/22 1849 (!) 136/92 -- -- 63 -- -- --   02/16/22 1755 (!) 166/106 -- -- 70 -- 96 % --   02/16/22 1752 (!) 135/90 -- -- -- -- 100 % --   02/16/22 1717 (!) 140/104 -- -- -- -- 96 % 133.8 kg (295 lb)   02/16/22 1651 -- -- -- -- -- 100 % --   02/16/22 1647 (!) 146/100 97.2  F (36.2  C) Temporal 74 18 -- --       Physical Exam  General: The patient is alert, in no respiratory distress.    HENT: Mucous membranes moist.    Cardiovascular: Regular  rate and rhythm. Good pulses in all four extremities. Normal capillary refill and skin turgor.  Adequate pulses in both dorsalis pedis posterior tibial and femoral regions bilaterally    Respiratory: Lungs are clear. No nasal flaring. No retractions. No wheezing, no crackles.    Gastrointestinal: Abdomen soft. No guarding, no rebound. No palpable hernias.     Musculoskeletal: No gross deformity.     Skin: No rashes or petechiae.  Slight coolness compared to the left over the left toes but similar otherwise including coloration    Neurologic: The patient is alert and oriented x3. GCS 15. No testable cranial nerve deficit. Follows commands with clear and appropriate speech. Gives appropriate answers. Good strength in all extremities. No gross neurologic deficit. Gross sensation intact. Pupils are round and reactive. No meningismus.     Lymphatic: No cervical adenopathy. No lower extremity swelling.    Psychiatric: The patient is non-tearful.    Emergency Department Course     Imaging:  US Lower Extremity Venous Duplex Left   Final Result   IMPRESSION:   1.  No deep venous thrombosis in the left lower extremity.          Laboratory:  Labs Ordered and Resulted from Time of ED Arrival to Time of ED Departure   BASIC METABOLIC PANEL - Normal       Result Value    Sodium 140      Potassium 4.2      Chloride 107      Carbon Dioxide (CO2) 30      Anion Gap 3      Urea Nitrogen 15      Creatinine 1.18      Calcium 9.2      Glucose 86      GFR Estimate 80     INR - Normal    INR 0.93     PARTIAL THROMBOPLASTIN TIME - Normal    aPTT 28     CBC WITH PLATELETS AND DIFFERENTIAL    WBC Count 6.9      RBC Count 5.62      Hemoglobin 16.4      Hematocrit 50.1      MCV 89      MCH 29.2      MCHC 32.7      RDW 13.6      Platelet Count 206      % Neutrophils 56      % Lymphocytes 32      % Monocytes 10      % Eosinophils 2      % Basophils 0      % Immature Granulocytes 0      NRBCs per 100 WBC 0      Absolute Neutrophils 3.8       Absolute Lymphocytes 2.2      Absolute Monocytes 0.7      Absolute Eosinophils 0.1      Absolute Basophils 0.0      Absolute Immature Granulocytes 0.0      Absolute NRBCs 0.0         Procedures:      Emergency Department Course:           Reviewed:    I reviewed nursing notes, vitals and past history    Assessments:   I obtained history and examined the patient as noted above.    I rechecked the patient and explained findings.       Consults:              Disposition:  The patient was discharged to home.    Impression & Plan      Medical Decision Making:  The patient has had various areas of traveling paresthesias that sound quite mild.  The fact that it is localized to both sides would lead me to think this is less likely a fixed lesion.  I did do careful palpation of lower extremity pulses and found that they were adequate.  While his left toes were slightly cooler I felt that vasospasm could be behind this.  However any areas to the distal vessels.  I did order an ultrasound and did not find DVT I do not think there is phlegmasia.  The rest of the foot has adequate warmth and the toes are only minimally cooler I therefore do not think that like the patient needs any interim immediate interventions.  He is already on blood thinners.  His Doppler is negative.  Ankle-brachial indices are above 1 and the patient was discharged outpatient follow-up.  Symptoms to return for discussed.  He will continue his Xarelto as an outpatient.      Diagnosis:    ICD-10-CM    1. Paresthesia  R20.2    2. Chronic anticoagulation  Z79.01           Ashish Barton MD  02/16/22 2253

## 2022-03-24 ENCOUNTER — TRANSFERRED RECORDS (OUTPATIENT)
Dept: HEALTH INFORMATION MANAGEMENT | Facility: CLINIC | Age: 42
End: 2022-03-24
Payer: COMMERCIAL

## 2022-03-24 ENCOUNTER — TELEPHONE (OUTPATIENT)
Dept: CARDIOLOGY | Facility: CLINIC | Age: 42
End: 2022-03-24
Payer: COMMERCIAL

## 2022-03-24 NOTE — TELEPHONE ENCOUNTER
Return VM from [facility], requesting patient's most recent creatinine level:    Component      Latest Ref Rng & Units 2/16/2022   Sodium      133 - 144 mmol/L 140   Potassium      3.4 - 5.3 mmol/L 4.2   Chloride      94 - 109 mmol/L 107   Carbon Dioxide      20 - 32 mmol/L 30   Anion Gap      3 - 14 mmol/L 3   Urea Nitrogen      7 - 30 mg/dL 15   Creatinine      0.66 - 1.25 mg/dL 1.18   Calcium      8.5 - 10.1 mg/dL 9.2   Glucose      70 - 99 mg/dL 86   GFR Estimate      >60 mL/min/1.73m2 80       Contacted [facility] to review this. Left detailed message with creatinine level from 2/16/22. Instructed to call back with further questions.

## 2022-03-24 NOTE — TELEPHONE ENCOUNTER
Moni Barreto MD  [facility] Heart Team 4 9 minutes ago (2:10 PM)     CF    The half life of xarelto is such that only 24 hour hold is necessary (this is what we do for cath procedures even). 3 days is not necessary. I would not advise 3 days but 24 hours should be ok.     Dr. Barreto    Message text      Contacted [facility] to review Dr. Barreto's recommendations. Left detailed message with [facility] nurses line, instructed to call back with any further questions.

## 2022-03-24 NOTE — TELEPHONE ENCOUNTER
[facility] called back and left VM requesting RN callback and leave VM with patient's most recent creatinine clearance. RN returned call and left detailed VM with lab results listed below.       Component      Latest Ref Rng & Units 2/16/2022   Sodium      133 - 144 mmol/L 140   Potassium      3.4 - 5.3 mmol/L 4.2   Chloride      94 - 109 mmol/L 107   Carbon Dioxide      20 - 32 mmol/L 30   Anion Gap      3 - 14 mmol/L 3   Urea Nitrogen      7 - 30 mg/dL 15   Creatinine      0.66 - 1.25 mg/dL 1.18   Calcium      8.5 - 10.1 mg/dL 9.2   Glucose      70 - 99 mg/dL 86   GFR Estimate      >60 mL/min/1.73m2 80

## 2022-03-24 NOTE — TELEPHONE ENCOUNTER
[facility] Call Center    Phone Message    May a detailed message be left on voicemail: yes     Reason for Call: Other: Eunice from [facility] called in wanting verbal orders to hold Pt's xarelto 3 days prior to 4/21 colonoscopy. Please c/b to discuss [phone]     Action Taken: Message routed to:  Other: hand cardio    Travel Screening: Not Applicable

## 2022-05-15 DIAGNOSIS — I10 BENIGN ESSENTIAL HYPERTENSION: ICD-10-CM

## 2022-05-15 NOTE — LETTER
[facility]  [address], [address]  [place] [id number] [phone]       May 20, 2022    Jace Pratt  [address] [facility] [address]    To Jace:     We have been calling you regarding a recent refill request we received for Lisinopril. We have filled this prescription, but we wanted to verify if you are still seen within the [facility] system or if you have changed to [facility]. We want to make sure that if you have established care outside of [place] that the new provider is filling your prescriptions.       Sincerely,    Deb Peoples, LEONEL, CNP / crc

## 2022-05-16 RX ORDER — LISINOPRIL 5 MG/1
TABLET ORAL
Qty: 30 TABLET | Refills: 1 | Status: SHIPPED | OUTPATIENT
Start: 2022-05-16 | End: 2022-09-27

## 2022-05-16 NOTE — TELEPHONE ENCOUNTER
Routing refill request to provider for review/approval because:  [place] given x1 and patient did not follow up, please advise    BP Readings from Last 3 Encounters:   02/16/22 (!) 142/93   11/18/21 (!) 145/105   09/01/21 138/83       Vivien Mcguire, RN, BSN, PHN  [facility] [place]

## 2022-05-17 NOTE — TELEPHONE ENCOUNTER
Due for BP follow-up, cardiology previously recommended dose increase of Lisinopril from 5 mg to 10 mg daily.      Is patient still receiving primary care at [facility] - appears that he has received care in  at [facility] as well.      Short term refill.      Please reach out to patient.      - TITUSeixl, CNP

## 2022-05-19 ENCOUNTER — TRANSFERRED RECORDS (OUTPATIENT)
Dept: HEALTH INFORMATION MANAGEMENT | Facility: CLINIC | Age: 42
End: 2022-05-19
Payer: COMMERCIAL

## 2022-05-31 DIAGNOSIS — I10 BENIGN ESSENTIAL HYPERTENSION: ICD-10-CM

## 2022-06-01 RX ORDER — LISINOPRIL 5 MG/1
TABLET ORAL
Qty: 90 TABLET | Refills: 1 | OUTPATIENT
Start: 2022-06-01

## 2022-06-01 NOTE — TELEPHONE ENCOUNTER
Short term refill completed on 5/16/22.  Can we confirm where patient is receiving primary care?  Per chart review, looks like he is getting refills through Dr. Pandya as well (in our system) and BRONSON Rousseau through [facility] [employer].      - JMeixl, CNP

## 2022-06-01 NOTE — TELEPHONE ENCOUNTER
Routing refill request to provider for review/approval because:   ACE Inhibitors (Including Combos) Protocol Failed 05/31/2022 07:55 PM   Protocol Details  Blood pressure under 140/90 in past 12 months        Krystle Perez RN, BSN  [facility]

## 2022-08-20 ENCOUNTER — HEALTH MAINTENANCE LETTER (OUTPATIENT)
Age: 42
End: 2022-08-20

## 2022-08-23 ENCOUNTER — APPOINTMENT (OUTPATIENT)
Dept: CT IMAGING | Facility: CLINIC | Age: 42
End: 2022-08-23
Attending: EMERGENCY MEDICINE
Payer: COMMERCIAL

## 2022-08-23 ENCOUNTER — HOSPITAL ENCOUNTER (EMERGENCY)
Facility: CLINIC | Age: 42
Discharge: HOME | End: 2022-08-24
Attending: EMERGENCY MEDICINE | Admitting: EMERGENCY MEDICINE
Payer: COMMERCIAL

## 2022-08-23 DIAGNOSIS — R07.9 ACUTE CHEST PAIN: ICD-10-CM

## 2022-08-23 LAB
ALBUMIN SERPL BCG-MCNC: 4.2 G/DL (ref 3.5–5.2)
ALP SERPL-CCNC: 61 U/L (ref 40–129)
ALT SERPL W P-5'-P-CCNC: 36 U/L (ref 10–50)
ANION GAP SERPL CALCULATED.3IONS-SCNC: 10 MMOL/L (ref 7–15)
AST SERPL W P-5'-P-CCNC: 29 U/L (ref 10–50)
BASOPHILS # BLD AUTO: 0 10E3/UL (ref 0–0.2)
BASOPHILS NFR BLD AUTO: 0 %
BILIRUB DIRECT SERPL-MCNC: <0.2 MG/DL (ref 0–0.3)
BILIRUB SERPL-MCNC: 0.5 MG/DL
BUN SERPL-MCNC: 16.1 MG/DL (ref 6–20)
CALCIUM SERPL-MCNC: 9.7 MG/DL (ref 8.6–10)
CHLORIDE SERPL-SCNC: 101 MMOL/L (ref 98–107)
CREAT SERPL-MCNC: 1.3 MG/DL (ref 0.67–1.17)
DEPRECATED HCO3 PLAS-SCNC: 27 MMOL/L (ref 22–29)
EOSINOPHIL # BLD AUTO: 0.3 10E3/UL (ref 0–0.7)
EOSINOPHIL NFR BLD AUTO: 4 %
ERYTHROCYTE [DISTWIDTH] IN BLOOD BY AUTOMATED COUNT: 13.1 % (ref 10–15)
GFR SERPL CREATININE-BSD FRML MDRD: 71 ML/MIN/1.73M2
GLUCOSE SERPL-MCNC: 106 MG/DL (ref 70–99)
HCT VFR BLD AUTO: 49.4 % (ref 40–53)
HGB BLD-MCNC: 16.3 G/DL (ref 13.3–17.7)
HOLD SPECIMEN: NORMAL
HOLD SPECIMEN: NORMAL
IMM GRANULOCYTES # BLD: 0 10E3/UL
IMM GRANULOCYTES NFR BLD: 0 %
LIPASE SERPL-CCNC: 33 U/L (ref 13–60)
LYMPHOCYTES # BLD AUTO: 2 10E3/UL (ref 0.8–5.3)
LYMPHOCYTES NFR BLD AUTO: 30 %
MCH RBC QN AUTO: 29 PG (ref 26.5–33)
MCHC RBC AUTO-ENTMCNC: 33 G/DL (ref 31.5–36.5)
MCV RBC AUTO: 88 FL (ref 78–100)
MONOCYTES # BLD AUTO: 0.6 10E3/UL (ref 0–1.3)
MONOCYTES NFR BLD AUTO: 9 %
NEUTROPHILS # BLD AUTO: 3.9 10E3/UL (ref 1.6–8.3)
NEUTROPHILS NFR BLD AUTO: 57 %
NRBC # BLD AUTO: 0 10E3/UL
NRBC BLD AUTO-RTO: 0 /100
PLATELET # BLD AUTO: 214 10E3/UL (ref 150–450)
POTASSIUM SERPL-SCNC: 4 MMOL/L (ref 3.4–5.3)
PROT SERPL-MCNC: 7.6 G/DL (ref 6.4–8.3)
RBC # BLD AUTO: 5.62 10E6/UL (ref 4.4–5.9)
SODIUM SERPL-SCNC: 138 MMOL/L (ref 136–145)
TROPONIN T SERPL HS-MCNC: 10 NG/L
WBC # BLD AUTO: 6.8 10E3/UL (ref 4–11)

## 2022-08-23 PROCEDURE — 83690 ASSAY OF LIPASE: CPT | Performed by: EMERGENCY MEDICINE

## 2022-08-23 PROCEDURE — 93005 ELECTROCARDIOGRAM TRACING: CPT

## 2022-08-23 PROCEDURE — 250N000011 HC RX IP 250 OP 636: Performed by: EMERGENCY MEDICINE

## 2022-08-23 PROCEDURE — 84484 ASSAY OF TROPONIN QUANT: CPT | Performed by: EMERGENCY MEDICINE

## 2022-08-23 PROCEDURE — 99285 EMERGENCY DEPT VISIT HI MDM: CPT | Mod: 25

## 2022-08-23 PROCEDURE — 71275 CT ANGIOGRAPHY CHEST: CPT

## 2022-08-23 PROCEDURE — 74177 CT ABD & PELVIS W/CONTRAST: CPT

## 2022-08-23 PROCEDURE — 85025 COMPLETE CBC W/AUTO DIFF WBC: CPT | Performed by: EMERGENCY MEDICINE

## 2022-08-23 PROCEDURE — 96375 TX/PRO/DX INJ NEW DRUG ADDON: CPT

## 2022-08-23 PROCEDURE — 96374 THER/PROPH/DIAG INJ IV PUSH: CPT | Mod: 59

## 2022-08-23 PROCEDURE — 36415 COLL VENOUS BLD VENIPUNCTURE: CPT | Performed by: EMERGENCY MEDICINE

## 2022-08-23 PROCEDURE — 80053 COMPREHEN METABOLIC PANEL: CPT | Performed by: EMERGENCY MEDICINE

## 2022-08-23 PROCEDURE — 82248 BILIRUBIN DIRECT: CPT | Performed by: EMERGENCY MEDICINE

## 2022-08-23 RX ORDER — ONDANSETRON 2 MG/ML
4 INJECTION INTRAMUSCULAR; INTRAVENOUS ONCE
Status: COMPLETED | OUTPATIENT
Start: 2022-08-23 | End: 2022-08-23

## 2022-08-23 RX ORDER — LABETALOL HYDROCHLORIDE 5 MG/ML
10 INJECTION, SOLUTION INTRAVENOUS ONCE
Status: COMPLETED | OUTPATIENT
Start: 2022-08-23 | End: 2022-08-23

## 2022-08-23 RX ORDER — IOPAMIDOL 755 MG/ML
500 INJECTION, SOLUTION INTRAVASCULAR ONCE
Status: COMPLETED | OUTPATIENT
Start: 2022-08-23 | End: 2022-08-23

## 2022-08-23 RX ADMIN — IOPAMIDOL 77 ML: 755 INJECTION, SOLUTION INTRAVENOUS at 23:36

## 2022-08-23 RX ADMIN — ONDANSETRON 4 MG: 2 INJECTION INTRAMUSCULAR; INTRAVENOUS at 22:20

## 2022-08-23 RX ADMIN — IOPAMIDOL 80 ML: 755 INJECTION, SOLUTION INTRAVENOUS at 22:09

## 2022-08-23 RX ADMIN — LABETALOL HYDROCHLORIDE 10 MG: 5 INJECTION, SOLUTION INTRAVENOUS at 22:21

## 2022-08-23 ASSESSMENT — ACTIVITIES OF DAILY LIVING (ADL): ADLS_ACUITY_SCORE: 35

## 2022-08-24 VITALS
RESPIRATION RATE: 16 BRPM | DIASTOLIC BLOOD PRESSURE: 70 MMHG | SYSTOLIC BLOOD PRESSURE: 127 MMHG | TEMPERATURE: 98.3 F | OXYGEN SATURATION: 90 % | HEART RATE: 70 BPM

## 2022-08-24 LAB
ATRIAL RATE - MUSE: 91 BPM
DIASTOLIC BLOOD PRESSURE - MUSE: NORMAL MMHG
INTERPRETATION ECG - MUSE: NORMAL
P AXIS - MUSE: 49 DEGREES
PR INTERVAL - MUSE: 162 MS
QRS DURATION - MUSE: 96 MS
QT - MUSE: 350 MS
QTC - MUSE: 430 MS
R AXIS - MUSE: -1 DEGREES
SYSTOLIC BLOOD PRESSURE - MUSE: NORMAL MMHG
T AXIS - MUSE: 38 DEGREES
TROPONIN T SERPL HS-MCNC: 11 NG/L
VENTRICULAR RATE- MUSE: 91 BPM

## 2022-08-24 NOTE — ED TRIAGE NOTES
Pt with sudden onset onset of upper chest pain, RUQ ab pain, and, sob after sex tonight.  Pt does have a history of PW, clot in his liver, and a descending aortic aneurysm  .      Triage Assessment     Row Name 08/23/22 2284       Triage Assessment (Adult)    Airway WDL WDL       Respiratory WDL    Respiratory WDL WDL

## 2022-08-24 NOTE — ED PROVIDER NOTES
History     Chief Complaint:  Chest Pain       HPI:  Jace Pratt is a 41 year old male who presents with chest pain.  Patient reports that for the past couple of days, he has noted mild flank/side pain.  Earlier this evening, around 7:30 PM, the patient was having intercourse with his spouse.  He developed the sudden onset of pain in his chest, radiating towards the shoulder blades, and upper abdomen.  This was associated with a pounding sensation in his chest, that has since improved, though pain has persisted. His pain is exacerbated by attempting to sit upright, such as lying to sitting or sitting to standing. Patient does have a history of saddle pulmonary embolism in 2019, and notes that his symptoms today feel somewhat similar to that, though he is not otherwise short of breath.  Denies headache.  He is on Xarelto, and reports compliance (per chart review had portal vein thrombus in setting of non-compliance with Xarelto), with his last dose being yesterday evening.  He does acknowledge a history of a descending aortic aneurysm, though denies any prior history of aortic dissection.  He has not taken any medications for pain prior to arrival.  Here in the ED, he notes nausea, though declines offer for analgesia.  Patient additionally was found to have an elevated coronary artery calcium score and underwent coronary angiography in December of 2020, showing mild non-occlusive CAD and no interventions were required.     Allergies:  Bee Pollen  Cefazolin  Azithromycin  Latex  Morphine  Penicillins  Bupropion     Medications:    aspirin (ASA) 81 MG EC tablet  atorvastatin (LIPITOR) 20 MG tablet  busPIRone (BUSPAR) 10 MG tablet  EPINEPHrine (ANY BX GENERIC EQUIV) 0.3 MG/0.3ML injection 2-pack  Lidocaine (LIDOCARE) 4 % Patch  lisinopril (ZESTRIL) 5 MG tablet  rivaroxaban ANTICOAGULANT (XARELTO) 15 MG TABS tablet  rivaroxaban ANTICOAGULANT (XARELTO) 20 MG TABS tablet        Past Medical History:    Past Medical  History:   Diagnosis Date     Acute saddle pulmonary embolism with acute cor pulmonale (H)      Anxiety      Ascending aortic aneurysm (H)      CAD (coronary artery disease) 12/01/2020     Chest pain      Depression      ANTON (dyspnea on exertion)      Former smoker      Neuromuscular disorder (H)      S/P coronary angiogram 12/01/2020     Stable angina (H)      Varicose veins of right lower extremity with edema      Patient Active Problem List    Diagnosis Date Noted     Portal vein thrombosis 07/08/2021     Priority: Medium     Abdominal pain, epigastric 07/08/2021     Priority: Medium     Chest pain, unspecified type 12/01/2020     Priority: Medium     Stable angina pectoris (H) 11/30/2020     Priority: Medium     Added automatically from request for surgery [id number]       Elevated coronary artery calcium score 11/30/2020     Priority: Medium     History of pulmonary embolism 11/30/2020     Priority: Medium     Saddle embolis, required lytics 3/19       Hx of major depression 11/30/2020     Priority: Medium     Ascending aortic aneurysm (H) 11/30/2020     Priority: Medium     Varicose veins of right lower extremity with edema 11/30/2020     Priority: Medium        Past Surgical History:    Past Surgical History:   Procedure Laterality Date     ANKLE SURGERY Right 2019    CMT     APPENDECTOMY  2007     CHOLECYSTECTOMY  2008     CV HEART CATHETERIZATION WITH POSSIBLE INTERVENTION N/A 12/1/2020    Procedure: Heart Catheterization with Possible Intervention;  Surgeon: Nurys Lopez MD;  Location:  HEART CARDIAC CATH LAB     CV LEFT HEART CATH N/A 12/1/2020    Procedure: Left Heart Cath;  Surgeon: Nurys Lopez MD;  Location:  HEART CARDIAC CATH LAB        Family History:    family history includes Clotting Disorder in his maternal grandmother; Depression in his brother, father, and mother.    Social History:   reports that he has quit smoking. His smoking use included cigarettes. He smoked 0.00 packs per  day for 0.00 years. He quit smokeless tobacco use about 3 years ago. He reports current alcohol use. He reports that he does not use drugs.    Review of Systems:  10 point ROS is negative aside from that mentioned in the HPI      Physical Exam     Patient Vitals for the past 24 hrs:   BP Temp Temp src Pulse Resp SpO2   08/24/22 0045 127/70 -- -- 70 -- 90 %   08/24/22 0030 136/82 -- -- 75 -- 92 %   08/24/22 0015 (!) 144/86 -- -- 75 -- 94 %   08/24/22 0000 139/84 -- -- 74 -- 93 %   08/23/22 2315 (!) 143/88 -- -- 75 -- 95 %   08/23/22 2300 139/81 -- -- 83 -- 95 %   08/23/22 2245 (!) 141/86 -- -- 79 -- 93 %   08/23/22 2230 137/85 -- -- 77 -- 94 %   08/23/22 2200 (!) 158/97 -- -- 90 -- --   08/23/22 2145 (!) 163/98 -- -- -- -- --   08/23/22 2108 (!) 204/125 -- -- -- -- --   08/23/22 2105 -- 98.3  F (36.8  C) Oral 86 16 98 %      General:   Well-nourished   Speaking in full sentences   No diaphoresis on exam  Eyes:   Conjunctiva without injection or scleral icterus  ENT:   Moist mucous membranes   Nares patent   Pinnae normal  Neck:   Full ROM   No stiffness appreciated  Resp:   Lungs CTAB   No crackles, wheezing or audible rubs   Good air movement  CV:    Normal rate, regular rhythm   S1 and S2 present   No murmur, gallop or rub  GI:   BS present   Abdomen soft without distention   Non-tender to light and deep palpation   No guarding or rebound tenderness  Skin:   Warm, dry, well perfused   No rashes or open wounds on exposed skin  MSK:   Moves all extremities   No focal deformities or swelling   Tenderness to palpation over anterior chest wall, reproduces pain  Neuro:   Alert   Answers questions appropriately   Moves all extremities equally   Gait stable  Psych:   Normal affect, normal mood      Emergency Department Course     ECG results from 08/23/22   EKG 12-lead, tracing only     Value    Systolic Blood Pressure     Diastolic Blood Pressure     Ventricular Rate 91    Atrial Rate 91    HI Interval 162    QRS Duration  96        QTc 430    P Axis 49    R AXIS -1    T Axis 38    Interpretation ECG      Sinus rhythm  Normal ECG  When compared with ECG of 01-SEP-2021 20:02,  Vent. rate has increased BY  30 BPM  Confirmed by - EMERGENCY ROOM, PHYSICIAN ([id number]),  CHRIS GUERIN ([id number]) on 8/24/2022 6:36:53 AM       Imaging:  Radiology findings were communicated with the patient who voiced understanding of the findings.  CT Chest Pulmonary Embolism w Contrast   Final Result   IMPRESSION:   1.  No acute abnormalities, no PE, dissection, or aneurysm.      CT Aortic Survey w Contrast   Final Result   IMPRESSION:   1.  Slight ectasia of the ascending thoracic aorta measuring 3.9 cm, not significantly changed. Aorta elsewhere is normal in caliber. No evidence for aortic dissection or acute aortic findings.      2.  Suboptimal opacification of the pulmonary arteries for evaluation of pulmonary embolism. There are some questionable subtle, although not definitive filling defects, in left lower lobe pulmonary arteries with the possibility of pulmonary embolism not    excluded. Repeat CT scan using the pulmonary embolism protocol would be helpful in further evaluation.      3.  Mild atelectasis in the lungs. No evidence for pneumonitis.      4.  Diffuse fatty infiltration of the liver.      Findings called to Dr. Thompson 8/23/2022 11:05 PM.              Laboratory:  Laboratory findings were communicated with the patient who voiced understanding of the findings.  Labs Ordered and Resulted from Time of ED Arrival to Time of ED Departure   BASIC METABOLIC PANEL - Abnormal       Result Value    Creatinine 1.30 (*)     Sodium 138      Potassium 4.0      Urea Nitrogen 16.1      Chloride 101      Carbon Dioxide (CO2) 27      Anion Gap 10      Glucose 106 (*)     GFR Estimate 71      Calcium 9.7     TROPONIN T, HIGH SENSITIVITY - Normal    Troponin T, High Sensitivity 10     HEPATIC FUNCTION PANEL - Normal    Protein Total 7.6      Albumin 4.2       Bilirubin Total 0.5      Alkaline Phosphatase 61      AST 29      ALT 36      Bilirubin Direct <0.20     LIPASE - Normal    Lipase 33     TROPONIN T, HIGH SENSITIVITY - Normal    Troponin T, High Sensitivity 11     CBC WITH PLATELETS AND DIFFERENTIAL    WBC Count 6.8      RBC Count 5.62      Hemoglobin 16.3      Hematocrit 49.4      MCV 88      MCH 29.0      MCHC 33.0      RDW 13.1      Platelet Count 214      % Neutrophils 57      % Lymphocytes 30      % Monocytes 9      % Eosinophils 4      % Basophils 0      % Immature Granulocytes 0      NRBCs per 100 WBC 0      Absolute Neutrophils 3.9      Absolute Lymphocytes 2.0      Absolute Monocytes 0.6      Absolute Eosinophils 0.3      Absolute Basophils 0.0      Absolute Immature Granulocytes 0.0      Absolute NRBCs 0.0          Interventions:  Medications   ondansetron (ZOFRAN) injection 4 mg (4 mg Intravenous Given 8/23/22 2220)   labetalol (NORMODYNE/TRANDATE) injection 10 mg (10 mg Intravenous Given 8/23/22 2221)   sodium chloride (PF) 0.9% PF flush 100 mL (60 mLs Intravenous Given 8/23/22 2209)   iopamidol (ISOVUE-370) solution 500 mL (80 mLs Intravenous Given 8/23/22 2209)   iopamidol (ISOVUE-370) solution 500 mL (77 mLs Intravenous Given 8/23/22 2336)   sodium chloride (PF) 0.9% PF flush 100 mL (90 mLs Intravenous Given 8/23/22 2336)        Emergency Department Course / Reassessment:  Nursing notes and vitals reviewed.  9:49 PM: I performed an exam of the patient as documented above.      The patient was sent for CT while in the emergency department, results above.      ED Course as of 08/24/22 1002   Tue Aug 23, 2022   2305 Spoke with Radiology.  Negative for dissection.   2308 Patient re-evaluated.  Symptoms improving.  Discussed risks/benefits of further evaluation of possible PE noted on CT aorta.  Patient agreeable to proceed.   Wed Aug 24, 2022   0048 Patient re-evaluated.  He is sleeping on my evaluation.  On re-assessment, pain is reproducible with  palpation over anterior chest wall.        I discussed the treatment plan with the patient and spouse. They expressed understanding of this plan and consented to discharge. They will be discharged home with instructions for care and follow up. In addition, the patient will return to the emergency department if their symptoms persist, worsen, if new symptoms arise or if there is any concern.  All questions were answered.    I personally reviewed the imaging and laboratory results with the Patient and answered all related questions prior to discharge.    Impression & Plan        Medical Decision Making:  Jace Pratt is a 41 year old male who presents to the ER for evaluation of chest pain.  Vital signs on presentation reveal elevated BP, though are otherwise unremarkable.  DDx is broad, including though was not limited to, aortic dissection, pulmonary embolism, ACS, pneumothorax, pneumonia, esophageal spasm, Boerhaave syndrome, portal vein thrombosis, ICH, musculoskeletal pain, among others.  Acknowledging patient's rapid onset of pain involving the chest, back, and upper abdomen, history of reported aortic aneurysm, and elevated BP on arrival, strong consideration given towards aortic dissection, and care expedited to obtain emergent imaging.  Patient provided IV labetalol prior to imaging completion for blood pressure control.  Fortunately, no evidence of acute aortic pathology is identified.  Patient does have slight ectasia of the ascending thoracic aorta measuring 3.9 cm, not significantly changed compared with previous.  In discussion with radiology, there is no evidence of portal vein thrombus that would cause his acute pain.  There did appear to be suboptimal opacification of the pulmonary arteries, and questionable filling defect in the left lower lobe for which radiology did recommend CT pulmonary embolism study.  After thorough discussion of risks/benefits, patient electing to proceed.  Fortunately,  subsequent CT PE study negative for PE or other acute process.  Note made of mild atelectasis and fatty infiltration of the liver, which should not account for patient's symptoms.  ACS on the differential though also felt to be unlikely.  History is atypical given rapid onset of pain.  EKG demonstrates sinus rhythm without findings of acute ischemia, or acute change compared with previous EKG available for comparison.  Additionally, his initial and repeat high-sensitivity troponin have returned within normal limits for which I feel further emergent cardiac evaluation can be deferred safely.  Abdominal exam is soft and otherwise nontender, arguing against concurrent surgical intra-abdominal pathology.  He denies any associated headache that would suggest ICH.  On reassessment, he was noted to be resting comfortably on the gurney and in fact sleeping.  Further examination, he does acknowledge and exhibits significant tenderness to palpation over his anterior chest wall, which he states reproduces his discomfort.  This is also exacerbated by actively using muscles of his chest wall, including grabbing the handrail with his arms to lift himself up, raising suspicion for a musculoskeletal component to his presenting pain.  Overall, results and clinical impression were discussed with the patient.  Given results of the above studies, improvement in hemodynamics, improvements in symptoms, I do feel with reasonable clinical certainty that patient can safely be discharged from the ED with very close monitoring of symptoms and close outpatient follow-up.  He is encouraged to return to the ED at any point should he develop any new or troubling symptoms such as recurrent pain, shortness of breath, syncope, or any other concerns.  Patient comfortable with outlined plan of care and questions have been answered to the patient and spouse prior to discharge.    Diagnosis:    ICD-10-CM    1. Acute chest pain  R07.9         8/23/2022    Richard Thompson MD Roach, Brian Donald, MD  08/24/22 1426

## 2022-08-24 NOTE — DISCHARGE INSTRUCTIONS
Please monitor symptoms very closely.    Follow-up with primary care provider in 1 to 2 days for recheck.    Return to the ER if you develop increased pain, shortness of breath, vomiting, or any other concerns.    Discharge Instructions  Chest Pain    You have been seen today for chest pain or discomfort.  At this time, your provider has found no signs that your chest pain is due to a serious or life-threatening condition, (or you have declined more testing and/or admission to the hospital). However, sometimes there is a serious problem that does not show up right away. Your evaluation today may not be complete and you may need further testing and evaluation.     Generally, every Emergency Department visit should have a follow-up clinic visit with either a primary or a specialty clinic/provider. Please follow-up as instructed by your emergency provider today.  Return to the Emergency Department if:  Your chest pain changes, gets worse, starts to happen more often, or comes with less activity.  You are newly short of breath.  You get very weak or tired.  You pass out or faint.  You have any new symptoms, like fever, cough, numb legs, or you cough up blood.  You have anything else that worries you.    Until you follow-up with your regular provider, please do the following:  Take one aspirin daily unless you have an allergy or are told not to by your provider.  If a stress test appointment has been made, go to the appointment.  If you have questions, contact your regular provider.  Follow-up with your regular provider/clinic as directed; this is very important.    If you were given a prescription for medicine here today, be sure to read all of the information (including the package insert) that comes with your prescription.  This will include important information about the medicine, its side effects, and any warnings that you need to know about.  The pharmacist who fills the prescription can provide more information  and answer questions you may have about the medicine.  If you have questions or concerns that the pharmacist cannot address, please call or return to the Emergency Department.       Remember that you can always come back to the Emergency Department if you are not able to see your regular provider in the amount of time listed above, if you get any new symptoms, or if there is anything that worries you.

## 2022-09-15 DIAGNOSIS — I10 BENIGN ESSENTIAL HYPERTENSION: ICD-10-CM

## 2022-09-15 NOTE — LETTER
September 26, 2022      Jace Pratt  [address] [facility] [place] [address]        We have been calling you regarding a recent refill request we received for Lisinopril.  Unfortunately, we were unable to reach you.  We are notifying you that you are due for physical with fasting labs scheduled prior to refilling your prescription.  You can schedule this appointment via [employer] or by calling the clinic at [phone] Option 1.          Sincerely,        LEONEL Andres CNP

## 2022-09-27 RX ORDER — LISINOPRIL 5 MG/1
TABLET ORAL
Qty: 30 TABLET | Refills: 1 | Status: SHIPPED | OUTPATIENT
Start: 2022-09-27 | End: 2023-02-02

## 2022-10-25 NOTE — TELEPHONE ENCOUNTER
Due for preventative visit + in clinic follow-up.      Appears that patient is receiving care from [facility] primary care/family practice.  Can we confirm where he is receiving primary care?      - William, CNP  
How Severe Are Your Spot(S)?: moderate
Left non detailed message for patient.    Charleen Spivey MA    
Left non-detailed message for patient to call back.  Please schedule annual or see if he is now going to [facility] when patient calls back.  (see previous notes for details)    Thanks Donna    
Please advise on refill since pt has not gotten back to [facility]inc     Thank you     Krystle Perez RN, BSN  [facility]      
Routing refill request to provider for review/approval because:  Labs out of range:    Patient needs to be seen because it has been more than 1 year since last office visit.    Creatinine   Date Value Ref Range Status   08/23/2022 1.30 (H) 0.67 - 1.17 mg/dL Final   07/09/2021 1.01 0.66 - 1.25 mg/dL Final     Team, please contact pt for yearly visit with provider.    Marilyn DUNCAN RN        
Unable to reach patient, letter sent.     Shyam Jack    
What Is The Reason For Today's Visit?: Upper Body Skin Exam
Additional History: \\n\\npt is here for a UBE- has some skin tags under his arms that he’d like to have checked today.

## 2022-11-20 ENCOUNTER — HEALTH MAINTENANCE LETTER (OUTPATIENT)
Age: 42
End: 2022-11-20

## 2022-12-28 ENCOUNTER — TELEPHONE (OUTPATIENT)
Dept: CARDIOLOGY | Facility: CLINIC | Age: 42
End: 2022-12-28

## 2022-12-28 NOTE — TELEPHONE ENCOUNTER
Received a call from [facility] requesting hold orders for Xarelto for a colonoscopy on 1/24/23. Any bridging needed?     G[facility]Y would like a 2 day hold and if Dr. Barreto requests a shorter hold they would need the most recent Cr. Level.     Will route to Dr. Barreto to review.

## 2023-01-03 NOTE — TELEPHONE ENCOUNTER
Moni Barreto MD  You 1 hour ago (1:00 PM)     CF  2 day hold is fine. Thanks.     Dr. Barreto      Tried to call [facility] to update about Xarelto hold. No answer. Left detailed voicemail informing ok for 2 day hold with no bridging. Left team 4 direct number to call back with any questions.

## 2023-02-02 ENCOUNTER — MYC MEDICAL ADVICE (OUTPATIENT)
Dept: CARDIOLOGY | Facility: CLINIC | Age: 43
End: 2023-02-02
Payer: COMMERCIAL

## 2023-02-02 DIAGNOSIS — I10 BENIGN ESSENTIAL HYPERTENSION: ICD-10-CM

## 2023-02-02 DIAGNOSIS — I26.02 ACUTE SADDLE PULMONARY EMBOLISM WITH ACUTE COR PULMONALE (H): ICD-10-CM

## 2023-02-02 RX ORDER — LISINOPRIL 5 MG/1
5 TABLET ORAL DAILY
Qty: 90 TABLET | Refills: 1 | Status: SHIPPED | OUTPATIENT
Start: 2023-02-02 | End: 2023-05-05 | Stop reason: DRUGHIGH

## 2023-02-02 RX ORDER — LISINOPRIL 5 MG/1
5 TABLET ORAL DAILY
Qty: 90 TABLET | Refills: 1 | Status: SHIPPED | OUTPATIENT
Start: 2023-02-02 | End: 2023-02-02

## 2023-05-05 ENCOUNTER — OFFICE VISIT (OUTPATIENT)
Dept: CARDIOLOGY | Facility: CLINIC | Age: 43
End: 2023-05-05
Payer: COMMERCIAL

## 2023-05-05 VITALS
OXYGEN SATURATION: 95 % | BODY MASS INDEX: 36.14 KG/M2 | HEART RATE: 76 BPM | WEIGHT: 306.1 LBS | HEIGHT: 77 IN | SYSTOLIC BLOOD PRESSURE: 124 MMHG | DIASTOLIC BLOOD PRESSURE: 80 MMHG

## 2023-05-05 DIAGNOSIS — I10 BENIGN ESSENTIAL HYPERTENSION: Primary | ICD-10-CM

## 2023-05-05 DIAGNOSIS — I77.810 DILATED AORTIC ROOT (H): ICD-10-CM

## 2023-05-05 DIAGNOSIS — I25.118 CORONARY ARTERY DISEASE OF NATIVE ARTERY OF NATIVE HEART WITH STABLE ANGINA PECTORIS (H): ICD-10-CM

## 2023-05-05 DIAGNOSIS — N52.9 ERECTILE DYSFUNCTION, UNSPECIFIED ERECTILE DYSFUNCTION TYPE: ICD-10-CM

## 2023-05-05 DIAGNOSIS — I26.02 ACUTE SADDLE PULMONARY EMBOLISM WITH ACUTE COR PULMONALE (H): ICD-10-CM

## 2023-05-05 DIAGNOSIS — I87.2 VENOUS INSUFFICIENCY: ICD-10-CM

## 2023-05-05 DIAGNOSIS — E78.5 DYSLIPIDEMIA, GOAL LDL BELOW 70: ICD-10-CM

## 2023-05-05 PROCEDURE — 99214 OFFICE O/P EST MOD 30 MIN: CPT | Performed by: PHYSICIAN ASSISTANT

## 2023-05-05 RX ORDER — LOSARTAN POTASSIUM 100 MG/1
100 TABLET ORAL DAILY
Qty: 90 TABLET | Refills: 3 | Status: SHIPPED | OUTPATIENT
Start: 2023-05-05

## 2023-05-05 RX ORDER — SILDENAFIL 50 MG/1
TABLET, FILM COATED ORAL
Qty: 10 TABLET | Refills: 11 | Status: SHIPPED | OUTPATIENT
Start: 2023-05-05 | End: 2023-12-21

## 2023-05-05 RX ORDER — LISINOPRIL 40 MG/1
40 TABLET ORAL DAILY
COMMUNITY
Start: 2023-03-30 | End: 2023-05-05

## 2023-05-05 NOTE — LETTER
"2023    Deb Peoples, APRN CNP  [address] [facility] [address]    RE: Jace Pratt       Dear Colleague,     I had the pleasure of seeing Jace Pratt in the [facility].  Cardiology Clinic Progress Note  Jace Pratt   : 1980    Service Date: 2023      PRIMARY CARDIOLOGIST:  Dr. Barreto     REASON FOR VISIT:  ER F/u cp     HISTORY OF PRESENT ILLNESS:    Jace Pratt is pleasant 42 year old yo gentleman with PMH significant for:  1.  History of saddle PE requiring lytic therapy at [facility] nonocclusive coronary disease when he presented for chest pain and had a calcium score that was 99% for his age.  He underwent angiogram that showed 10% proximal mid LAD lesion, 10% proximal mid RCA lesion with no intervention.  3.  History of portal vein thrombosis  with hypercoagulable work-up done at [place] being negative for coagulopathy on long-term anticoagulation  4.  Enlarged ascending aorta at 4.2 cm last visualized on PE study at [facility]  where it was noted to be \"normal\"    Clint comes in today for ER follow-up.  He had presented with severe chest pain and heaviness and wheezing.  He had negative troponins and this was felt to be an asthma exacerbation.  Work-up there was negative.  He went home, cleaned his docs and had his carpets all cleaned and now his symptoms have completely resolved.  He denies chest pain, shortness of breath, orthopnea, or PND.    He is concerned about erectile dysfunction.  He has a problem getting and maintaining erection.  He has not tried anything for it.  He is wondering if it secondary to medications or other cause.     SOCIAL HISTORY:   He has 3 children 10 6 and 4.  He is .  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  He is a previous smoker and previously chewed tobacco now quit both.  He is " "working on losing weight.     PHYSICAL EXAMINATION:   /80   Pulse 76   Ht 1.956 m (6' 5\")   Wt 138.8 kg (306 lb 1.6 oz)   SpO2 95%   BMI 36.30 kg/m      Well-developed well-nourished gentleman in no acute distress.  Normocephalic atraumatic.  Heart is regular rate rhythm without murmur rub or gallop.  Lungs are clear without wheezes rales or rhonchi.  Extremities without peripheral edema.  Carotids are quiet.     ASSESSMENT AND PLAN:   1.  Episode of chest pain in the ER with negative work-up and the symptoms resolved with cleaning of his docs and carpets likely secondary to allergies/asthma.    2.  Aorta at 4.2 cm previously noted on studies called normal on a PE study at online on this year we will repeat an echocardiogram next summer to reassess.  This will have to be normalized based on his height of 6 foot 5.    3.  Nonocclusive coronary disease with just 10% lesions but more than expected for his age.  He has been off statin and aspirin we will repeat a lipid panel and see where we should restart statin.    4.  History of PE, provoked and portal vein thrombus that was unprovoked on lifelong anticoagulation    5.  Erectile dysfunction, unlikely that this is secondary to lisinopril but that is a possibility I am going to stop that and switch it out for losartan 100 mg daily.  We will also do some basic labs like thyroid hemoglobin and BMP and TSH to see if there is any underlying cause.  If this is not enough he can use sildenafil up to 100 mg 1 hour before sexual activity.  Present to the ER with preop visit.  If this is not effective we will send him back to primary.    6.  Dyslipidemia as above    7.  Hypertension as above.    Thank you for allowing me to participate in this delightful patient's care.  We will get these studies done and medications started and he will check home blood pressures and call if they are higher than 130/80.  He will see Dr. Padilla back in 1 year with an echocardiogram " and BMP before that visit.                Quynh Waterman PA-C  8:33 AM 5/5/2023   [facility]  Pager: [phone]            Review of Systems:     Negative unless noted in HPI          Medications:     Current Outpatient Medications   Medication Sig Dispense Refill    EPINEPHrine (ANY BX GENERIC EQUIV) 0.3 MG/0.3ML injection 2-pack Inject 0.3 mLs (0.3 mg) into the muscle as needed for anaphylaxis 2 each 1    Lidocaine (LIDOCARE) 4 % Patch Place 1 patch onto the skin every 24 hours To prevent lidocaine toxicity, patient should be patch free for 12 hrs daily.      losartan (COZAAR) 100 MG tablet Take 1 tablet (100 mg) by mouth daily 90 tablet 3    rivaroxaban ANTICOAGULANT (XARELTO) 20 MG TABS tablet Take 1 tablet (20 mg) by mouth daily (with dinner) 90 tablet 1    sildenafil (VIAGRA) 50 MG tablet Take 1/2 to 1 full pill 1 hour before sexual activity. 10 tablet 11       Family History   Problem Relation Age of Onset    Depression Mother     Depression Father     Clotting Disorder Maternal Grandmother     Depression Brother        Social History     Socioeconomic History    Marital status:      Spouse name: Not on file    Number of children: Not on file    Years of education: Not on file    Highest education level: Not on file   Occupational History    Not on file   Tobacco Use    Smoking status: Former     Packs/day: 0.00     Years: 0.00     Pack years: 0.00     Types: Cigarettes     Quit date: 2013     Years since quitting: 10.3    Smokeless tobacco: Former     Types: Chew     Quit date: 2019    Tobacco comments:     Socially only   Vaping Use    Vaping status: Not on file   Substance and Sexual Activity    Alcohol use: Not Currently     Comment: quit 1/1/23    Drug use: Never    Sexual activity: Not on file   Other Topics Concern    Parent/sibling w/ CABG, MI or angioplasty before 65F 55M? No   Social History Narrative    Not on file     Social Determinants of Health     Financial Resource Strain: Not on file    Food Insecurity: Not on file   Transportation Needs: Not on file   Physical Activity: Not on file   Stress: Not on file   Social Connections: Not on file   Intimate Partner Violence: Not on file   Housing Stability: Not on file            Past Medical History:     Past Medical History:   Diagnosis Date    Acute saddle pulmonary embolism with acute cor pulmonale (H)     Anxiety     Ascending aortic aneurysm (H)     CAD (coronary artery disease) 12/01/2020    Mild nonobstructive    Chest pain     Depression     ANTON (dyspnea on exertion)     Former smoker     Neuromuscular disorder (H)     S/P coronary angiogram 12/01/2020    No intervention    Stable angina (H)     Varicose veins of right lower extremity with edema               Past Surgical History:     Past Surgical History:   Procedure Laterality Date    ANKLE SURGERY Right 2019    CMT    APPENDECTOMY  2007    CHOLECYSTECTOMY  2008    CV HEART CATHETERIZATION WITH POSSIBLE INTERVENTION N/A 12/1/2020    Procedure: Heart Catheterization with Possible Intervention;  Surgeon: Nurys Lopez MD;  Location:  HEART CARDIAC CATH LAB    CV LEFT HEART CATH N/A 12/1/2020    Procedure: Left Heart Cath;  Surgeon: Nurys Lopez MD;  Location:  HEART CARDIAC CATH LAB              Allergies:   Bee pollen, Cefazolin, Azithromycin, Latex, Morphine, Penicillins, and Bupropion       Data:   All laboratory data reviewed:    Recent Labs   Lab Test 07/07/21  0910   *   HDL 45   NHDL 131*   CHOL 176   TRIG 106       Lab Results   Component Value Date    WBC 6.8 08/23/2022    WBC 7.1 07/09/2021    RBC 5.62 08/23/2022    RBC 5.53 07/09/2021    HGB 16.3 08/23/2022    HGB 16.1 07/09/2021    HCT 49.4 08/23/2022    HCT 47.9 07/09/2021    MCV 88 08/23/2022    MCV 87 07/09/2021    MCH 29.0 08/23/2022    MCH 29.1 07/09/2021    MCHC 33.0 08/23/2022    MCHC 33.6 07/09/2021    RDW 13.1 08/23/2022    RDW 13.4 07/09/2021     08/23/2022     07/09/2021       Lab Results    Component Value Date     08/23/2022     07/09/2021    POTASSIUM 4.0 08/23/2022    POTASSIUM 4.2 02/16/2022    POTASSIUM 3.8 07/09/2021    CHLORIDE 101 08/23/2022    CHLORIDE 107 02/16/2022    CHLORIDE 108 07/09/2021    CO2 27 08/23/2022    CO2 30 02/16/2022    CO2 23 07/09/2021    ANIONGAP 10 08/23/2022    ANIONGAP 3 02/16/2022    ANIONGAP 6 07/09/2021     (H) 08/23/2022    GLC 86 02/16/2022    GLC 98 07/09/2021    BUN 16.1 08/23/2022    BUN 15 02/16/2022    BUN 15 07/09/2021    CR 1.30 (H) 08/23/2022    CR 1.01 07/09/2021    GFRESTIMATED 71 08/23/2022    GFRESTIMATED >90 07/09/2021    GFRESTBLACK >90 07/09/2021    JANIE 9.7 08/23/2022    JANIE 8.8 07/09/2021      Lab Results   Component Value Date    AST 29 08/23/2022    AST 24 07/09/2021    ALT 36 08/23/2022    ALT 57 07/09/2021       No results found for: A1C    Lab Results   Component Value Date    INR 0.93 02/16/2022    INR 1.29 (H) 04/20/2019           Thank you for allowing me to participate in the care of your patient.      Sincerely,     Quynh Waterman PA-C     [facility]  cc:   No referring provider defined for this encounter.

## 2023-05-05 NOTE — PROGRESS NOTES
"Cardiology Clinic Progress Note  Jace Pratt   : 1980    Service Date: 2023      PRIMARY CARDIOLOGIST:  Dr. Barreto     REASON FOR VISIT:  ER F/u cp     HISTORY OF PRESENT ILLNESS:    Jace Pratt is pleasant 42 year old yo gentleman with PMH significant for:  1.  History of saddle PE requiring lytic therapy at [facility] nonocclusive coronary disease when he presented for chest pain and had a calcium score that was 99% for his age.  He underwent angiogram that showed 10% proximal mid LAD lesion, 10% proximal mid RCA lesion with no intervention.  3.  History of portal vein thrombosis  with hypercoagulable work-up done at [place] being negative for coagulopathy on long-term anticoagulation  4.  Enlarged ascending aorta at 4.2 cm last visualized on PE study at [facility]  where it was noted to be \"normal\"    Clint comes in today for ER follow-up.  He had presented with severe chest pain and heaviness and wheezing.  He had negative troponins and this was felt to be an asthma exacerbation.  Work-up there was negative.  He went home, cleaned his docs and had his carpets all cleaned and now his symptoms have completely resolved.  He denies chest pain, shortness of breath, orthopnea, or PND.    He is concerned about erectile dysfunction.  He has a problem getting and maintaining erection.  He has not tried anything for it.  He is wondering if it secondary to medications or other cause.     SOCIAL HISTORY:   He has 3 children 10 6 and 4.  He is .  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  He is a previous smoker and previously chewed tobacco now quit both.  He is working on losing weight.     PHYSICAL EXAMINATION:   /80   Pulse 76   Ht 1.956 m (6' 5\")   Wt 138.8 kg (306 lb 1.6 oz)   SpO2 95%   BMI 36.30 kg/m      Well-developed well-nourished gentleman in no acute " distress.  Normocephalic atraumatic.  Heart is regular rate rhythm without murmur rub or gallop.  Lungs are clear without wheezes rales or rhonchi.  Extremities without peripheral edema.  Carotids are quiet.     ASSESSMENT AND PLAN:   1.  Episode of chest pain in the ER with negative work-up and the symptoms resolved with cleaning of his docs and carpets likely secondary to allergies/asthma.    2.  Aorta at 4.2 cm previously noted on studies called normal on a PE study at online on this year we will repeat an echocardiogram next summer to reassess.  This will have to be normalized based on his height of 6 foot 5.    3.  Nonocclusive coronary disease with just 10% lesions but more than expected for his age.  He has been off statin and aspirin we will repeat a lipid panel and see where we should restart statin.    4.  History of PE, provoked and portal vein thrombus that was unprovoked on lifelong anticoagulation    5.  Erectile dysfunction, unlikely that this is secondary to lisinopril but that is a possibility I am going to stop that and switch it out for losartan 100 mg daily.  We will also do some basic labs like thyroid hemoglobin and BMP and TSH to see if there is any underlying cause.  If this is not enough he can use sildenafil up to 100 mg 1 hour before sexual activity.  Present to the ER with preop visit.  If this is not effective we will send him back to primary.    6.  Dyslipidemia as above    7.  Hypertension as above.    Thank you for allowing me to participate in this delightful patient's care.  We will get these studies done and medications started and he will check home blood pressures and call if they are higher than 130/80.  He will see Dr. Padilla back in 1 year with an echocardiogram and BMP before that visit.                Quynh Waterman PA-C  8:33 AM 5/5/2023   [facility]  Pager: [phone]            Review of Systems:     Negative unless noted in HPI          Medications:     Current Outpatient  Medications   Medication Sig Dispense Refill     EPINEPHrine (ANY BX GENERIC EQUIV) 0.3 MG/0.3ML injection 2-pack Inject 0.3 mLs (0.3 mg) into the muscle as needed for anaphylaxis 2 each 1     Lidocaine (LIDOCARE) 4 % Patch Place 1 patch onto the skin every 24 hours To prevent lidocaine toxicity, patient should be patch free for 12 hrs daily.       losartan (COZAAR) 100 MG tablet Take 1 tablet (100 mg) by mouth daily 90 tablet 3     rivaroxaban ANTICOAGULANT (XARELTO) 20 MG TABS tablet Take 1 tablet (20 mg) by mouth daily (with dinner) 90 tablet 1     sildenafil (VIAGRA) 50 MG tablet Take 1/2 to 1 full pill 1 hour before sexual activity. 10 tablet 11       Family History   Problem Relation Age of Onset     Depression Mother      Depression Father      Clotting Disorder Maternal Grandmother      Depression Brother        Social History     Socioeconomic History     Marital status:      Spouse name: Not on file     Number of children: Not on file     Years of education: Not on file     Highest education level: Not on file   Occupational History     Not on file   Tobacco Use     Smoking status: Former     Packs/day: 0.00     Years: 0.00     Pack years: 0.00     Types: Cigarettes     Quit date: 2013     Years since quitting: 10.3     Smokeless tobacco: Former     Types: Chew     Quit date: 2019     Tobacco comments:     Socially only   Vaping Use     Vaping status: Not on file   Substance and Sexual Activity     Alcohol use: Not Currently     Comment: quit 1/1/23     Drug use: Never     Sexual activity: Not on file   Other Topics Concern     Parent/sibling w/ CABG, MI or angioplasty before 65F 55M? No   Social History Narrative     Not on file     Social Determinants of Health     Financial Resource Strain: Not on file   Food Insecurity: Not on file   Transportation Needs: Not on file   Physical Activity: Not on file   Stress: Not on file   Social Connections: Not on file   Intimate Partner Violence: Not on file    Housing Stability: Not on file            Past Medical History:     Past Medical History:   Diagnosis Date     Acute saddle pulmonary embolism with acute cor pulmonale (H)      Anxiety      Ascending aortic aneurysm (H)      CAD (coronary artery disease) 12/01/2020    Mild nonobstructive     Chest pain      Depression      ANTON (dyspnea on exertion)      Former smoker      Neuromuscular disorder (H)      S/P coronary angiogram 12/01/2020    No intervention     Stable angina (H)      Varicose veins of right lower extremity with edema               Past Surgical History:     Past Surgical History:   Procedure Laterality Date     ANKLE SURGERY Right 2019    CMT     APPENDECTOMY  2007     CHOLECYSTECTOMY  2008     CV HEART CATHETERIZATION WITH POSSIBLE INTERVENTION N/A 12/1/2020    Procedure: Heart Catheterization with Possible Intervention;  Surgeon: Nurys Lopez MD;  Location:  HEART CARDIAC CATH LAB     CV LEFT HEART CATH N/A 12/1/2020    Procedure: Left Heart Cath;  Surgeon: Nurys Lopez MD;  Location:  HEART CARDIAC CATH LAB              Allergies:   Bee pollen, Cefazolin, Azithromycin, Latex, Morphine, Penicillins, and Bupropion       Data:   All laboratory data reviewed:    Recent Labs   Lab Test 07/07/21  0910   *   HDL 45   NHDL 131*   CHOL 176   TRIG 106       Lab Results   Component Value Date    WBC 6.8 08/23/2022    WBC 7.1 07/09/2021    RBC 5.62 08/23/2022    RBC 5.53 07/09/2021    HGB 16.3 08/23/2022    HGB 16.1 07/09/2021    HCT 49.4 08/23/2022    HCT 47.9 07/09/2021    MCV 88 08/23/2022    MCV 87 07/09/2021    MCH 29.0 08/23/2022    MCH 29.1 07/09/2021    MCHC 33.0 08/23/2022    MCHC 33.6 07/09/2021    RDW 13.1 08/23/2022    RDW 13.4 07/09/2021     08/23/2022     07/09/2021       Lab Results   Component Value Date     08/23/2022     07/09/2021    POTASSIUM 4.0 08/23/2022    POTASSIUM 4.2 02/16/2022    POTASSIUM 3.8 07/09/2021    CHLORIDE 101 08/23/2022     CHLORIDE 107 02/16/2022    CHLORIDE 108 07/09/2021    CO2 27 08/23/2022    CO2 30 02/16/2022    CO2 23 07/09/2021    ANIONGAP 10 08/23/2022    ANIONGAP 3 02/16/2022    ANIONGAP 6 07/09/2021     (H) 08/23/2022    GLC 86 02/16/2022    GLC 98 07/09/2021    BUN 16.1 08/23/2022    BUN 15 02/16/2022    BUN 15 07/09/2021    CR 1.30 (H) 08/23/2022    CR 1.01 07/09/2021    GFRESTIMATED 71 08/23/2022    GFRESTIMATED >90 07/09/2021    GFRESTBLACK >90 07/09/2021    JANIE 9.7 08/23/2022    JANIE 8.8 07/09/2021      Lab Results   Component Value Date    AST 29 08/23/2022    AST 24 07/09/2021    ALT 36 08/23/2022    ALT 57 07/09/2021       No results found for: A1C    Lab Results   Component Value Date    INR 0.93 02/16/2022    INR 1.29 (H) 04/20/2019

## 2023-05-05 NOTE — PATIENT INSTRUCTIONS
Thank you for visiting with me today.    We discussed: we'll do some labs to make sure something else isn't causing ED.      Medication changes:  stop lisinopril.   Start taking losartan 100 mg once a day.    Use sildenafil as needed.      Follow up: with Dr. Barreto with an echo and labs in 1 year.        Please call my nurse, Lalita, at [phone] with any questions or concerns.    Scheduling phone number: [phone]  Reminder: Please bring in all current medications, over the counter supplements and vitamin bottles to your next appointment.

## 2023-08-17 ENCOUNTER — TELEPHONE (OUTPATIENT)
Dept: CARDIOLOGY | Facility: CLINIC | Age: 43
End: 2023-08-17
Payer: COMMERCIAL

## 2023-09-16 ENCOUNTER — HEALTH MAINTENANCE LETTER (OUTPATIENT)
Age: 43
End: 2023-09-16

## 2023-10-06 ENCOUNTER — MYC MEDICAL ADVICE (OUTPATIENT)
Dept: CARDIOLOGY | Facility: CLINIC | Age: 43
End: 2023-10-06
Payer: COMMERCIAL

## 2023-10-06 DIAGNOSIS — I26.02 ACUTE SADDLE PULMONARY EMBOLISM WITH ACUTE COR PULMONALE (H): ICD-10-CM

## 2023-10-06 NOTE — TELEPHONE ENCOUNTER
See [place] message.  Lalita Livingston RN 10/06/23 10:59 AM    Left message to call back if still needs refills.  Lalita Livingston RN 10/25/23 12:29 PM

## 2023-10-06 NOTE — TELEPHONE ENCOUNTER
Yes ok to refill both or arb if that's what he's taking.    Quynh Waterman PA-C 10/6/2023 11:15 AM

## 2023-10-19 ENCOUNTER — HOSPITAL ENCOUNTER (EMERGENCY)
Facility: CLINIC | Age: 43
Discharge: HOME | End: 2023-10-19
Attending: NURSE PRACTITIONER | Admitting: NURSE PRACTITIONER
Payer: COMMERCIAL

## 2023-10-19 ENCOUNTER — APPOINTMENT (OUTPATIENT)
Dept: ULTRASOUND IMAGING | Facility: CLINIC | Age: 43
End: 2023-10-19
Attending: NURSE PRACTITIONER
Payer: COMMERCIAL

## 2023-10-19 VITALS
HEART RATE: 87 BPM | RESPIRATION RATE: 16 BRPM | OXYGEN SATURATION: 95 % | DIASTOLIC BLOOD PRESSURE: 90 MMHG | SYSTOLIC BLOOD PRESSURE: 132 MMHG | TEMPERATURE: 98.9 F

## 2023-10-19 DIAGNOSIS — M25.562 LEFT MEDIAL KNEE PAIN: ICD-10-CM

## 2023-10-19 DIAGNOSIS — M79.605 PAIN OF LEFT LOWER EXTREMITY: ICD-10-CM

## 2023-10-19 PROCEDURE — G0463 HOSPITAL OUTPT CLINIC VISIT: HCPCS | Mod: 25 | Performed by: NURSE PRACTITIONER

## 2023-10-19 PROCEDURE — 99212 OFFICE O/P EST SF 10 MIN: CPT | Performed by: NURSE PRACTITIONER

## 2023-10-19 PROCEDURE — 93971 EXTREMITY STUDY: CPT | Mod: LT

## 2023-10-19 ASSESSMENT — ACTIVITIES OF DAILY LIVING (ADL): ADLS_ACUITY_SCORE: 35

## 2023-10-19 NOTE — ED PROVIDER NOTES
ED Provider Note  [facility]      History     Chief Complaint   Patient presents with    Knee Pain     Left knee pain and swelling x 2 months that has gotten worse. No injury noted. Patient states he feels / hears a pop when he bends the knee. Pt states he also has a history of clotting. Patient had orthopedic UC about 6 weeks ago and nothing was noted, per patient.      HPI  Jace Pratt is a 43 year old male who reports having 2 months of left medial and knee pain.  Reports that he was seen by an Ortho [facility] who believed it to be a strain.  Has a history of PE and DVTs, has pain with palpation in popliteal fossa left-sided.  Requests to have a MRI of his knee.            Allergies:  Allergies   Allergen Reactions    Bee Pollen Anaphylaxis     Other reaction(s): Unknown Reaction    Cefazolin Hives     Other reaction(s): Hives    Azithromycin Nausea and Vomiting    Latex Hives    Morphine Hives    Penicillins Nausea and Vomiting    Bupropion Other (See Comments)     Made him feel suicidal  Made him feel suicidal         Problem List:    Patient Active Problem List    Diagnosis Date Noted    Portal vein thrombosis 07/08/2021     Priority: Medium    Abdominal pain, epigastric 07/08/2021     Priority: Medium    Chest pain, unspecified type 12/01/2020     Priority: Medium    Stable angina pectoris 11/30/2020     Priority: Medium     Added automatically from request for surgery [id number]      Elevated coronary artery calcium score 11/30/2020     Priority: Medium    History of pulmonary embolism 11/30/2020     Priority: Medium     Saddle embolis, required lytics 3/19      Hx of major depression 11/30/2020     Priority: Medium    Ascending aortic aneurysm (H24) 11/30/2020     Priority: Medium    Varicose veins of right lower extremity with edema 11/30/2020     Priority: Medium        Past Medical History:    Past Medical History:   Diagnosis Date    Acute saddle pulmonary embolism with acute  cor pulmonale (H)     Anxiety     Ascending aortic aneurysm (H)     CAD (coronary artery disease) 12/01/2020    Chest pain     Depression     ANTON (dyspnea on exertion)     Former smoker     Neuromuscular disorder (H)     S/P coronary angiogram 12/01/2020    Stable angina (H)     Varicose veins of right lower extremity with edema        Past Surgical History:    Past Surgical History:   Procedure Laterality Date    ANKLE SURGERY Right 2019    CMT    APPENDECTOMY  2007    CHOLECYSTECTOMY  2008    CV HEART CATHETERIZATION WITH POSSIBLE INTERVENTION N/A 12/1/2020    Procedure: Heart Catheterization with Possible Intervention;  Surgeon: Nurys Lopez MD;  Location:  HEART CARDIAC CATH LAB    CV LEFT HEART CATH N/A 12/1/2020    Procedure: Left Heart Cath;  Surgeon: Nurys Lopez MD;  Location:  HEART CARDIAC CATH LAB       Family History:    Family History   Problem Relation Age of Onset    Depression Mother     Depression Father     Clotting Disorder Maternal Grandmother     Depression Brother        Social History:  Marital Status:   [2]  Social History     Tobacco Use    Smoking status: Former     Packs/day: 0.00     Years: 0.00     Additional pack years: 0.00     Total pack years: 0.00     Types: Cigarettes     Quit date: 2013     Years since quitting: 10.8    Smokeless tobacco: Former     Types: Chew     Quit date: 2019    Tobacco comments:     Socially only   Substance Use Topics    Alcohol use: Not Currently     Comment: quit 1/1/23    Drug use: Never        Medications:    EPINEPHrine (ANY BX GENERIC EQUIV) 0.3 MG/0.3ML injection 2-pack  Lidocaine (LIDOCARE) 4 % Patch  losartan (COZAAR) 100 MG tablet  rivaroxaban ANTICOAGULANT (XARELTO) 20 MG TABS tablet  sildenafil (VIAGRA) 50 MG tablet          Review of Systems  A medically appropriate review of systems was performed with pertinent positives and negatives noted in the HPI, and all other systems negative.    Physical Exam   Patient Vitals for  the past 24 hrs:   BP Temp Temp src Pulse Resp SpO2   10/19/23 1412 (!) 132/90 -- -- 87 16 95 %   10/19/23 1410 -- 98.9  F (37.2  C) Tympanic -- -- --          Physical Exam  General: alert and in no acute distress on arrival  Lungs:  nonlabored, CTA bilateral throughout  CV: Regular rate and rhythm extremities warm and perfused, mild edema in left foot and ankle.  Skin: no rashes, no diaphoresis and skin color normal  Neuro: Patient awake, alert, speech is fluent, appropriate historian  Psychiatric: affect/mood normal.      ED Course                 Procedures                    Results for orders placed or performed during the hospital encounter of 10/19/23 (from the past 24 hour(s))   US Lower Extremity Venous Duplex Left    Narrative    VENOUS ULTRASOUND LEFT LOWER EXTREMITY  10/19/2023 2:44 PM     HISTORY: History of PE and DVTs, left lower leg pain and swelling-pain  behind left knee.    COMPARISON: February 16, 2022    TECHNIQUE: Color Doppler and spectral waveform analysis performed  throughout the deep veins of the left lower extremity.    FINDINGS: The left common femoral, proximal great saphenous, femoral,  and popliteal veins demonstrate normal blood flow, compression, and  augmentation. Posterior tibial and peroneal veins are compressible.  Contralateral right common femoral vein is patent.      Impression    IMPRESSION: Negative for DVT in the left lower extremity.    KOFI SALAZAR MD         SYSTEM ID:  [id number]       MEDICATIONS GIVEN IN THE EMERGENCY DEPARTMENT:  Medications - No data to display             Assessments & Plan (with Medical Decision Making)  43 year old male who presents to the Urgent Care for evaluation of left knee pain, left lower leg pain       I have reviewed the nursing notes.  I have reviewed the findings, diagnosis, plan and need for follow up with the patient.  There are some DVT study ruled out clots in lower left leg and behind the knee.  I have ordered a Ortho consult  and they will call you to schedule this.  Recommend use of ice, heat and staying mobile to prevent stiffness.      NEW PRESCRIPTIONS STARTED AT TODAY'S ER VISIT  New Prescriptions    No medications on file       Final diagnoses:   Pain of left lower extremity   Left medial knee pain       10/19/2023   [facility] EMERGENCY DEPT       Suzy Ramos, APRN CNP  10/19/23 [id number]

## 2023-11-09 ENCOUNTER — OFFICE VISIT (OUTPATIENT)
Dept: ORTHOPEDICS | Facility: CLINIC | Age: 43
End: 2023-11-09
Attending: NURSE PRACTITIONER
Payer: COMMERCIAL

## 2023-11-09 VITALS — HEIGHT: 77 IN | BODY MASS INDEX: 36.3 KG/M2

## 2023-11-09 DIAGNOSIS — M79.605 PAIN OF LEFT LOWER EXTREMITY: ICD-10-CM

## 2023-11-09 PROCEDURE — 99204 OFFICE O/P NEW MOD 45 MIN: CPT | Mod: 25 | Performed by: FAMILY MEDICINE

## 2023-11-09 PROCEDURE — 20611 DRAIN/INJ JOINT/BURSA W/US: CPT | Mod: LT | Performed by: FAMILY MEDICINE

## 2023-11-09 RX ADMIN — ROPIVACAINE HYDROCHLORIDE 4 ML: 5 INJECTION, SOLUTION EPIDURAL; INFILTRATION; PERINEURAL at 16:29

## 2023-11-09 RX ADMIN — BETAMETHASONE SODIUM PHOSPHATE AND BETAMETHASONE ACETATE 6 MG: 3; 3 INJECTION, SUSPENSION INTRA-ARTICULAR; INTRALESIONAL; INTRAMUSCULAR; SOFT TISSUE at 16:29

## 2023-11-09 ASSESSMENT — PAIN SCALES - GENERAL: PAINLEVEL: NO PAIN (0)

## 2023-11-09 NOTE — PATIENT INSTRUCTIONS
# Left Knee Pain: Jace Pratt  was seen today for left knee pain. Symptoms had been going on for 2 months without inciting injury. On examination there are positive findings of tenderness palpation over the medial joint line, mild tenderness with stressing MCL. Imaging findings showed no arthritis, no fracture on outside x-rays. Likely cause of patient's condition due to mild early arthritis in the left knee. Other possible conditions contributing to symptoms include meniscus tear.  Counseled patient on nature of condition and treatment options.  Given this plan as below, follow-up as needed.     Image Findings: No arthritis on outside x-rays  Treatment: Activities as tolerated, home exercises given today  Medications/Injections: Limited tylenol for pain for 1-2 weeks, Topical Voltaren gel, left knee joint steroid injection  Follow-up: In one month if symptoms do not improve, sooner if worsening  Can consider further evaluation, possible MRI    Please call [phone]   Ask for my team if you have any questions or concerns    If you have not yet received the influenza vaccine but would like to get one, please call  [phone] or you can schedule via edjing    It was great seeing you today!    Edson Worthy MD, CAQSM     FS Injection Discharge Instructions    Procedure: left knee joint steroid injection     You may shower, however avoid swimming, tub baths or hot tubs for 24 hours following your procedure  You may have a mild to moderate increase in pain for several days following the injection.  It may take up to 14 days for the steroid medication to start working although you may feel the effect as early as a few days after the procedure.  You may use ice packs for 10-15 minutes, 3 to 4 times a day at the injection site for comfort  You may use anti-inflammatory medications (such as Ibuprofen or Aleve or Advil) or Tylenol for pain control if necessary  If you were fasting, you may resume your  normal diet and medications after the procedure  If you have diabetes, check your blood sugar more frequently than usual as your blood sugar may be higher than normal for 10-14 days following a steroid injection. Contact your doctor who manages your diabetes if your blood sugar is higher than usual    If you experience any of the following, call [facility] @ [phone] or [phone]  -Fever over 100 degree F  -Swelling, bleeding, redness, drainage, warmth at the injection site  - New or worsening pain

## 2023-11-09 NOTE — LETTER
11/9/2023         RE: Jace Pratt  [address] [address]        Dear Colleague,    Thank you for referring your patient, Jace Pratt, to the [facility]. Please see a copy of my visit note below.    ASSESSMENT & PLAN    Jace was seen today for pain.    Diagnoses and all orders for this visit:    Pain of left lower extremity  -     Orthopedic  Referral  -     Large Joint Injection/Arthocentesis: L knee joint      This issue is acute and Worsening.    # Left Knee Pain: Jace Pratt  was seen today for left knee pain. Symptoms had been going on for 2 months without inciting injury. On examination there are positive findings of tenderness palpation over the medial joint line, mild tenderness with stressing MCL. Imaging findings showed no arthritis, no fracture on outside x-rays. Likely cause of patient's condition due to mild early arthritis in the left knee. Other possible conditions contributing to symptoms include meniscus tear.  Counseled patient on nature of condition and treatment options.  Given this plan as below, follow-up as needed.     Image Findings: No arthritis on outside x-rays  Treatment: Activities as tolerated, home exercises given today  Medications/Injections: Limited tylenol for pain for 1-2 weeks, Topical Voltaren gel, left knee joint steroid injection  Follow-up: In one month if symptoms do not improve, sooner if worsening  Can consider further evaluation, possible MRI      Edson Worthy MD  [facility]    -----  Chief Complaint   Patient presents with     Left Knee - Pain       SUBJECTIVE  Jace Pratt is a/an 43 year old male who is seen in consultation at the request of  Suzy Ramos C.N.P. for evaluation of left knee pain.     The patient is seen by themselves.      Onset: 2 month(s) ago. Reports insidious onset without acute precipitating event. Patient seen in ED  "10/19/23 and an US was performed. Patient previously seen at [facility] a month ago.   Location of Pain: left medial knee  Worsened by: sleeping, lateral movement,   Better with: rest   Treatments tried: rest/activity avoidance, elevation, and ice  Associated symptoms: no distal numbness or tingling; denies swelling or warmth    Orthopedic/Surgical history: NO  Social History/Occupation: Not currently working     No family history pertinent to patient's problem today.      REVIEW OF SYSTEMS:  Review of Systems  Constitutional, HEENT, cardiovascular, pulmonary, gi and gu systems are negative, except as otherwise noted.    OBJECTIVE:  Ht 1.956 m (6' 5\")   BMI 36.30 kg/m     General: healthy, alert and in no distress  HEENT: no scleral icterus or conjunctival erythema  Skin: no suspicious lesions or rash. No jaundice.  CV: distal perfusion intact    Resp: normal respiratory effort without conversational dyspnea   Psych: normal mood and affect  Gait: normal steady gait with appropriate coordination and balance    Neuro: Normal light sensory exam of left lower extremity      Ortho Exam   LEFT KNEE  Inspection:    Normal alignment; no edema, erythema, or ecchymosis present  Palpation:    Tender about the MCL, medial joint line. Remainder of bony and ligamentous landmarks are nontender.    No effusion is present    Patellofemoral crepitus is Absent  Range of Motion:     00 extension to 1350 flexion  Strength:    Quadriceps 5/5, hamstrings 5/5, gastrocsoleus 5/5, and tibialis anterior 5/5    Extensor mechanism intact  Special Tests:    Positive: MCL/valgus stress (0 & 30 deg)    Negative: Patellar grind, LCL/varus stress (0 & 30 deg), Lachman's, anterior drawer, posterior drawer    RADIOLOGY:  I independently, visualized and reviewed these images with the patient     Results for orders placed or performed during the hospital encounter of 10/19/23   US Lower Extremity Venous Duplex Left    Narrative    VENOUS ULTRASOUND LEFT LOWER " EXTREMITY  10/19/2023 2:44 PM     HISTORY: History of PE and DVTs, left lower leg pain and swelling-pain  behind left knee.    COMPARISON: February 16, 2022    TECHNIQUE: Color Doppler and spectral waveform analysis performed  throughout the deep veins of the left lower extremity.    FINDINGS: The left common femoral, proximal great saphenous, femoral,  and popliteal veins demonstrate normal blood flow, compression, and  augmentation. Posterior tibial and peroneal veins are compressible.  Contralateral right common femoral vein is patent.      Impression    IMPRESSION: Negative for DVT in the left lower extremity.    KOFI SALAZAR MD         SYSTEM ID:  [id number]         Review of external notes as documented elsewhere in note  Review of the result(s) of each unique test - left lower leg ultrasound       Disclaimer: This note consists of symbols derived from keyboarding, dictation and/or voice recognition software. As a result, there may be errors in the script that have gone undetected. Please consider this when interpreting information found in this chart.    Large Joint Injection/Arthocentesis: L knee joint    Date/Time: 11/9/2023 4:29 PM    Performed by: Edson Worthy MD  Authorized by: Edson Worthy MD    Indications:  Pain  Needle Size:  25 G  Guidance: ultrasound    Approach:  Superolateral  Location:  Knee      Medications:  6 mg betamethasone acet & sod phos 6 (3-3) MG/ML; 4 mL ROPivacaine 5 MG/ML  Outcome:  Tolerated well, no immediate complications  Procedure discussed: discussed risks, benefits, and alternatives    Consent Given by:  Patient  Timeout: timeout called immediately prior to procedure    Prep: patient was prepped and draped in usual sterile fashion     Ultrasound images of procedure were permanently stored.     Patient reported significant improvement of pain after the numbing portion left knee joint steroid injection.  Ultrasound guided images were permanently stored.    Aftercare instructions given to patient.  Plan to follow-up as discussed above.     Edson Worthy MD [facility]              Again, thank you for allowing me to participate in the care of your patient.        Sincerely,        Edson Worthy MD

## 2023-11-09 NOTE — PROGRESS NOTES
ASSESSMENT & PLAN    Jace was seen today for pain.    Diagnoses and all orders for this visit:    Pain of left lower extremity  -     Orthopedic  Referral  -     Large Joint Injection/Arthocentesis: L knee joint      This issue is acute and Worsening.    # Left Knee Pain: Jace Pratt  was seen today for left knee pain. Symptoms had been going on for 2 months without inciting injury. On examination there are positive findings of tenderness palpation over the medial joint line, mild tenderness with stressing MCL. Imaging findings showed no arthritis, no fracture on outside x-rays. Likely cause of patient's condition due to mild early arthritis in the left knee. Other possible conditions contributing to symptoms include meniscus tear.  Counseled patient on nature of condition and treatment options.  Given this plan as below, follow-up as needed.     Image Findings: No arthritis on outside x-rays  Treatment: Activities as tolerated, home exercises given today  Medications/Injections: Limited tylenol for pain for 1-2 weeks, Topical Voltaren gel, left knee joint steroid injection  Follow-up: In one month if symptoms do not improve, sooner if worsening  Can consider further evaluation, possible MRI      Edson Worthy MD  [facility]    -----  Chief Complaint   Patient presents with    Left Knee - Pain       SUBJECTIVE  Jace Pratt is a/an 43 year old male who is seen in consultation at the request of  Suzy Ramos C.N.P. for evaluation of left knee pain.     The patient is seen by themselves.      Onset: 2 month(s) ago. Reports insidious onset without acute precipitating event. Patient seen in ED 10/19/23 and an US was performed. Patient previously seen at [facility] a month ago.   Location of Pain: left medial knee  Worsened by: sleeping, lateral movement,   Better with: rest   Treatments tried: rest/activity avoidance, elevation, and ice  Associated symptoms: no  "distal numbness or tingling; denies swelling or warmth    Orthopedic/Surgical history: NO  Social History/Occupation: Not currently working     No family history pertinent to patient's problem today.      REVIEW OF SYSTEMS:  Review of Systems  Constitutional, HEENT, cardiovascular, pulmonary, gi and gu systems are negative, except as otherwise noted.    OBJECTIVE:  Ht 1.956 m (6' 5\")   BMI 36.30 kg/m     General: healthy, alert and in no distress  HEENT: no scleral icterus or conjunctival erythema  Skin: no suspicious lesions or rash. No jaundice.  CV: distal perfusion intact    Resp: normal respiratory effort without conversational dyspnea   Psych: normal mood and affect  Gait: normal steady gait with appropriate coordination and balance    Neuro: Normal light sensory exam of left lower extremity      Ortho Exam   LEFT KNEE  Inspection:    Normal alignment; no edema, erythema, or ecchymosis present  Palpation:    Tender about the MCL, medial joint line. Remainder of bony and ligamentous landmarks are nontender.    No effusion is present    Patellofemoral crepitus is Absent  Range of Motion:     00 extension to 1350 flexion  Strength:    Quadriceps 5/5, hamstrings 5/5, gastrocsoleus 5/5, and tibialis anterior 5/5    Extensor mechanism intact  Special Tests:    Positive: MCL/valgus stress (0 & 30 deg)    Negative: Patellar grind, LCL/varus stress (0 & 30 deg), Lachman's, anterior drawer, posterior drawer    RADIOLOGY:  I independently, visualized and reviewed these images with the patient     Results for orders placed or performed during the hospital encounter of 10/19/23   US Lower Extremity Venous Duplex Left    Narrative    VENOUS ULTRASOUND LEFT LOWER EXTREMITY  10/19/2023 2:44 PM     HISTORY: History of PE and DVTs, left lower leg pain and swelling-pain  behind left knee.    COMPARISON: February 16, 2022    TECHNIQUE: Color Doppler and spectral waveform analysis performed  throughout the deep veins of the left " lower extremity.    FINDINGS: The left common femoral, proximal great saphenous, femoral,  and popliteal veins demonstrate normal blood flow, compression, and  augmentation. Posterior tibial and peroneal veins are compressible.  Contralateral right common femoral vein is patent.      Impression    IMPRESSION: Negative for DVT in the left lower extremity.    KOFI SALAZAR MD         SYSTEM ID:  [id number]         Review of external notes as documented elsewhere in note  Review of the result(s) of each unique test - left lower leg ultrasound       Disclaimer: This note consists of symbols derived from keyboarding, dictation and/or voice recognition software. As a result, there may be errors in the script that have gone undetected. Please consider this when interpreting information found in this chart.    Large Joint Injection/Arthocentesis: L knee joint    Date/Time: 11/9/2023 4:29 PM    Performed by: Edson Worthy MD  Authorized by: Edson Worthy MD    Indications:  Pain  Needle Size:  25 G  Guidance: ultrasound    Approach:  Superolateral  Location:  Knee      Medications:  6 mg betamethasone acet & sod phos 6 (3-3) MG/ML; 4 mL ROPivacaine 5 MG/ML  Outcome:  Tolerated well, no immediate complications  Procedure discussed: discussed risks, benefits, and alternatives    Consent Given by:  Patient  Timeout: timeout called immediately prior to procedure    Prep: patient was prepped and draped in usual sterile fashion     Ultrasound images of procedure were permanently stored.     Patient reported significant improvement of pain after the numbing portion left knee joint steroid injection.  Ultrasound guided images were permanently stored.   Aftercare instructions given to patient.  Plan to follow-up as discussed above.     Edson Worthy MD [facility]

## 2023-11-14 RX ORDER — ROPIVACAINE HYDROCHLORIDE 5 MG/ML
4 INJECTION, SOLUTION EPIDURAL; INFILTRATION; PERINEURAL
Status: SHIPPED | OUTPATIENT
Start: 2023-11-09

## 2023-11-14 RX ORDER — BETAMETHASONE SODIUM PHOSPHATE AND BETAMETHASONE ACETATE 3; 3 MG/ML; MG/ML
6 INJECTION, SUSPENSION INTRA-ARTICULAR; INTRALESIONAL; INTRAMUSCULAR; SOFT TISSUE
Status: SHIPPED | OUTPATIENT
Start: 2023-11-09

## 2023-11-17 ENCOUNTER — MYC REFILL (OUTPATIENT)
Dept: CARDIOLOGY | Facility: CLINIC | Age: 43
End: 2023-11-17
Payer: COMMERCIAL

## 2023-11-17 DIAGNOSIS — I26.02 ACUTE SADDLE PULMONARY EMBOLISM WITH ACUTE COR PULMONALE (H): ICD-10-CM

## 2023-12-21 ENCOUNTER — MYC REFILL (OUTPATIENT)
Dept: CARDIOLOGY | Facility: CLINIC | Age: 43
End: 2023-12-21
Payer: COMMERCIAL

## 2023-12-21 DIAGNOSIS — I26.02 ACUTE SADDLE PULMONARY EMBOLISM WITH ACUTE COR PULMONALE (H): ICD-10-CM

## 2023-12-21 DIAGNOSIS — N52.9 ERECTILE DYSFUNCTION, UNSPECIFIED ERECTILE DYSFUNCTION TYPE: ICD-10-CM

## 2023-12-22 RX ORDER — SILDENAFIL 50 MG/1
TABLET, FILM COATED ORAL
Qty: 10 TABLET | Refills: 6 | Status: SHIPPED | OUTPATIENT
Start: 2023-12-22

## 2023-12-22 NOTE — TELEPHONE ENCOUNTER
[facility] Cardiology Refill Guideline reviewed.  Medication meets criteria for refill.  Anna Kong RN on 12/22/2023 at 4:25 PM

## 2024-01-19 ENCOUNTER — OFFICE VISIT (OUTPATIENT)
Dept: ORTHOPEDICS | Facility: CLINIC | Age: 44
End: 2024-01-19
Payer: COMMERCIAL

## 2024-01-19 ENCOUNTER — TELEPHONE (OUTPATIENT)
Dept: ORTHOPEDICS | Facility: CLINIC | Age: 44
End: 2024-01-19
Payer: COMMERCIAL

## 2024-01-19 DIAGNOSIS — M79.605 PAIN OF LEFT LOWER EXTREMITY: Primary | ICD-10-CM

## 2024-01-19 NOTE — TELEPHONE ENCOUNTER
Patient had an injection done in his knees a couple weeks ago and said the pain is horrible.  He would like a referral for a MRI.    Please call at earliest convenience.

## 2024-01-19 NOTE — LETTER
1/19/2024         RE: Jace Pratt  [address] [address]        Dear Colleague,    Thank you for referring your patient, Jace Pratt, to the [facility]. Please see a copy of my visit note below.    No notes on file    Again, thank you for allowing me to participate in the care of your patient.        Sincerely,        Edson Worthy MD

## 2024-01-30 ENCOUNTER — ANCILLARY PROCEDURE (OUTPATIENT)
Dept: MRI IMAGING | Facility: CLINIC | Age: 44
End: 2024-01-30
Attending: FAMILY MEDICINE
Payer: COMMERCIAL

## 2024-01-30 DIAGNOSIS — M79.605 PAIN OF LEFT LOWER EXTREMITY: ICD-10-CM

## 2024-01-30 PROCEDURE — 73721 MRI JNT OF LWR EXTRE W/O DYE: CPT | Mod: LT | Performed by: RADIOLOGY

## 2024-11-09 ENCOUNTER — HEALTH MAINTENANCE LETTER (OUTPATIENT)
Age: 44
End: 2024-11-09

## (undated) DEVICE — DEFIB PRO-PADZ LVP LQD GEL ADULT 8900-2105-01

## (undated) DEVICE — SLEEVE TR BAND RADIAL COMPRESSION DEVICE 24CM TRB24-REG

## (undated) DEVICE — RAD G/W INQWIRE .035X260CM J-TIP EXCHANGE IQ35F260J1O5RS

## (undated) DEVICE — KIT HAND CONTROL ANGIOTOUCH ACIST 65CM AT-P65

## (undated) DEVICE — INTRO GLIDESHEATH SLENDER 6FR 10X45CM 60-1060

## (undated) DEVICE — 5FR X 100CM INFINITI TL DIAGNOSTIC CATHETER, JUDKINS RIGHT CORONARY, JR 4, FEMORAL SELECTIVE THRULUMEN, STANDARD, 0.038IN MAX GUIDEWIRE (EA/1)

## (undated) DEVICE — TOTE ANGIO CORP PC15AT SAN32CC83O

## (undated) DEVICE — 5FR X 100CM INFINITI TL DIAGNOSTIC CATHETER, JUDKINS LEFT CORONARY, JL 3.5, FEMORAL SELECTIVE THRULUMEN, SMALL, 0.038IN MAX GUIDEWIRE (EA/1)

## (undated) DEVICE — MANIFOLD KIT ANGIO AUTOMATED 014613

## (undated) RX ORDER — FENTANYL CITRATE 50 UG/ML
INJECTION, SOLUTION INTRAMUSCULAR; INTRAVENOUS
Status: DISPENSED
Start: 2020-12-01

## (undated) RX ORDER — HEPARIN SODIUM 1000 [USP'U]/ML
INJECTION, SOLUTION INTRAVENOUS; SUBCUTANEOUS
Status: DISPENSED
Start: 2020-12-01

## (undated) RX ORDER — VERAPAMIL HYDROCHLORIDE 2.5 MG/ML
INJECTION, SOLUTION INTRAVENOUS
Status: DISPENSED
Start: 2020-12-01

## (undated) RX ORDER — NITROGLYCERIN 5 MG/ML
VIAL (ML) INTRAVENOUS
Status: DISPENSED
Start: 2020-12-01

## (undated) RX ORDER — LIDOCAINE HYDROCHLORIDE 10 MG/ML
INJECTION, SOLUTION EPIDURAL; INFILTRATION; INTRACAUDAL; PERINEURAL
Status: DISPENSED
Start: 2020-09-10

## (undated) RX ORDER — BUPIVACAINE HYDROCHLORIDE 2.5 MG/ML
INJECTION, SOLUTION EPIDURAL; INFILTRATION; INTRACAUDAL
Status: DISPENSED
Start: 2020-09-10

## (undated) RX ORDER — TRIAMCINOLONE ACETONIDE 40 MG/ML
INJECTION, SUSPENSION INTRA-ARTICULAR; INTRAMUSCULAR
Status: DISPENSED
Start: 2020-09-10

## (undated) RX ORDER — HEPARIN SODIUM 200 [USP'U]/100ML
INJECTION, SOLUTION INTRAVENOUS
Status: DISPENSED
Start: 2020-12-01

## (undated) RX ORDER — LIDOCAINE HYDROCHLORIDE 10 MG/ML
INJECTION, SOLUTION EPIDURAL; INFILTRATION; INTRACAUDAL; PERINEURAL
Status: DISPENSED
Start: 2020-12-01